# Patient Record
Sex: MALE | Race: WHITE | NOT HISPANIC OR LATINO | Employment: FULL TIME | URBAN - METROPOLITAN AREA
[De-identification: names, ages, dates, MRNs, and addresses within clinical notes are randomized per-mention and may not be internally consistent; named-entity substitution may affect disease eponyms.]

---

## 2017-07-13 ENCOUNTER — HOSPITAL ENCOUNTER (EMERGENCY)
Facility: HOSPITAL | Age: 37
Discharge: HOME | End: 2017-07-13
Attending: EMERGENCY MEDICINE
Payer: COMMERCIAL

## 2017-07-13 ENCOUNTER — TELEPHONE (OUTPATIENT)
Dept: CARDIOLOGY | Facility: CLINIC | Age: 37
End: 2017-07-13

## 2017-07-13 VITALS
RESPIRATION RATE: 18 BRPM | TEMPERATURE: 98 F | HEART RATE: 82 BPM | HEIGHT: 74 IN | SYSTOLIC BLOOD PRESSURE: 147 MMHG | OXYGEN SATURATION: 100 % | DIASTOLIC BLOOD PRESSURE: 90 MMHG | WEIGHT: 195 LBS | BODY MASS INDEX: 25.03 KG/M2

## 2017-07-13 DIAGNOSIS — R53.1 WEAKNESS: ICD-10-CM

## 2017-07-13 DIAGNOSIS — R07.89 OTHER CHEST PAIN: Primary | ICD-10-CM

## 2017-07-13 DIAGNOSIS — R55 NEAR SYNCOPE: Primary | ICD-10-CM

## 2017-07-13 LAB
ALBUMIN SERPL BCP-MCNC: 4.3 G/DL
ALP SERPL-CCNC: 48 U/L
ALT SERPL W/O P-5'-P-CCNC: 42 U/L
AMPHET+METHAMPHET UR QL: NEGATIVE
ANION GAP SERPL CALC-SCNC: 10 MMOL/L
AST SERPL-CCNC: 37 U/L
BARBITURATES UR QL SCN>200 NG/ML: NORMAL
BASOPHILS # BLD AUTO: 0.09 K/UL
BASOPHILS NFR BLD: 1.7 %
BENZODIAZ UR QL SCN>200 NG/ML: NEGATIVE
BILIRUB SERPL-MCNC: 0.7 MG/DL
BILIRUB UR QL STRIP: NEGATIVE
BUN SERPL-MCNC: 10 MG/DL
BZE UR QL SCN: NEGATIVE
CALCIUM SERPL-MCNC: 9.3 MG/DL
CANNABINOIDS UR QL SCN: NEGATIVE
CHLORIDE SERPL-SCNC: 110 MMOL/L
CK SERPL-CCNC: 164 U/L
CLARITY UR: CLEAR
CO2 SERPL-SCNC: 19 MMOL/L
COLOR UR: YELLOW
CREAT SERPL-MCNC: 0.9 MG/DL
CREAT UR-MCNC: 47.1 MG/DL
DIFFERENTIAL METHOD: ABNORMAL
EOSINOPHIL # BLD AUTO: 0.1 K/UL
EOSINOPHIL NFR BLD: 1.3 %
ERYTHROCYTE [DISTWIDTH] IN BLOOD BY AUTOMATED COUNT: 12.1 %
EST. GFR  (AFRICAN AMERICAN): >60 ML/MIN/1.73 M^2
EST. GFR  (NON AFRICAN AMERICAN): >60 ML/MIN/1.73 M^2
ETHANOL SERPL-MCNC: <10 MG/DL
GLUCOSE SERPL-MCNC: 89 MG/DL
GLUCOSE UR QL STRIP: NEGATIVE
HCT VFR BLD AUTO: 41.8 %
HGB BLD-MCNC: 14.2 G/DL
HGB UR QL STRIP: NEGATIVE
KETONES UR QL STRIP: NEGATIVE
LEUKOCYTE ESTERASE UR QL STRIP: NEGATIVE
LYMPHOCYTES # BLD AUTO: 1.3 K/UL
LYMPHOCYTES NFR BLD: 24.3 %
MAGNESIUM SERPL-MCNC: 2.8 MG/DL
MCH RBC QN AUTO: 34.6 PG
MCHC RBC AUTO-ENTMCNC: 34 %
MCV RBC AUTO: 102 FL
METHADONE UR QL SCN>300 NG/ML: NEGATIVE
MONOCYTES # BLD AUTO: 0.7 K/UL
MONOCYTES NFR BLD: 12.5 %
NEUTROPHILS # BLD AUTO: 3.2 K/UL
NEUTROPHILS NFR BLD: 59.8 %
NITRITE UR QL STRIP: NEGATIVE
OPIATES UR QL SCN: NEGATIVE
PCP UR QL SCN>25 NG/ML: NEGATIVE
PH UR STRIP: 6 [PH] (ref 5–8)
PHOSPHATE SERPL-MCNC: 3 MG/DL
PLATELET # BLD AUTO: 197 K/UL
PMV BLD AUTO: 9.5 FL
POCT GLUCOSE: 94 MG/DL (ref 70–110)
POTASSIUM SERPL-SCNC: 4.5 MMOL/L
PROT SERPL-MCNC: 7.3 G/DL
PROT UR QL STRIP: NEGATIVE
RBC # BLD AUTO: 4.1 M/UL
SODIUM SERPL-SCNC: 139 MMOL/L
SP GR UR STRIP: 1.01 (ref 1–1.03)
T4 FREE SERPL-MCNC: 0.77 NG/DL
TOXICOLOGY INFORMATION: NORMAL
TROPONIN I SERPL DL<=0.01 NG/ML-MCNC: <0.006 NG/ML
TSH SERPL DL<=0.005 MIU/L-ACNC: 0.51 UIU/ML
URN SPEC COLLECT METH UR: NORMAL
UROBILINOGEN UR STRIP-ACNC: NEGATIVE EU/DL
WBC # BLD AUTO: 5.35 K/UL

## 2017-07-13 PROCEDURE — 80307 DRUG TEST PRSMV CHEM ANLYZR: CPT

## 2017-07-13 PROCEDURE — 82550 ASSAY OF CK (CPK): CPT

## 2017-07-13 PROCEDURE — 84484 ASSAY OF TROPONIN QUANT: CPT

## 2017-07-13 PROCEDURE — 84439 ASSAY OF FREE THYROXINE: CPT

## 2017-07-13 PROCEDURE — 85025 COMPLETE CBC W/AUTO DIFF WBC: CPT

## 2017-07-13 PROCEDURE — 93010 ELECTROCARDIOGRAM REPORT: CPT | Mod: ,,, | Performed by: INTERNAL MEDICINE

## 2017-07-13 PROCEDURE — 84100 ASSAY OF PHOSPHORUS: CPT

## 2017-07-13 PROCEDURE — 82962 GLUCOSE BLOOD TEST: CPT

## 2017-07-13 PROCEDURE — 80320 DRUG SCREEN QUANTALCOHOLS: CPT

## 2017-07-13 PROCEDURE — 25000003 PHARM REV CODE 250: Performed by: EMERGENCY MEDICINE

## 2017-07-13 PROCEDURE — 99284 EMERGENCY DEPT VISIT MOD MDM: CPT | Mod: 25

## 2017-07-13 PROCEDURE — 80053 COMPREHEN METABOLIC PANEL: CPT

## 2017-07-13 PROCEDURE — 81003 URINALYSIS AUTO W/O SCOPE: CPT

## 2017-07-13 PROCEDURE — 93005 ELECTROCARDIOGRAM TRACING: CPT

## 2017-07-13 PROCEDURE — 84443 ASSAY THYROID STIM HORMONE: CPT

## 2017-07-13 PROCEDURE — 83735 ASSAY OF MAGNESIUM: CPT

## 2017-07-13 PROCEDURE — 96360 HYDRATION IV INFUSION INIT: CPT

## 2017-07-13 RX ADMIN — SODIUM CHLORIDE 1000 ML: 0.9 INJECTION, SOLUTION INTRAVENOUS at 10:07

## 2017-07-13 NOTE — TELEPHONE ENCOUNTER
----- Message from Corey Blake MD sent at 7/13/2017  1:58 PM CDT -----  Regarding: FW: Outpatient Appt   Thank you so much for the referral      Chelsie would you pleas set up a stress test for the patient      Orders are placed      ZN  ----- Message -----  From: Jonh Roger MD  Sent: 7/13/2017  12:31 PM  To: Corey Blake MD  Subject: Outpatient Appt                                  Dr. Blake,        I was hoping you might be able to schedule a patient I saw in the ED at [facility] (7/13) for an outpatient appointment. He is an otherwise healthy 36 y/o M with a history of smoking that has been having exertional lightheadedness at work for about 3 days. 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 His EKG appeared to show some ventricular conduction delay, and he is hypertensive in the ED, but otherwise his radiology and blood workup are benign. I'm also having him follow up with a primary care physician. He can be reached at [phone], and I've also asked him to call your office to schedule an appointment. Thanks for your help!    -Oleksandr Roger

## 2017-07-13 NOTE — TELEPHONE ENCOUNTER
----- Message from Corey Blake MD sent at 7/13/2017  1:58 PM CDT -----  Regarding: FW: Outpatient Appt   Thank you so much for the referral      Chelsie would you pleas set up a stress test for the patient      Orders are placed      ZN  ----- Message -----  From: Jonh Roger MD  Sent: 7/13/2017  12:31 PM  To: Corey Blake MD  Subject: Outpatient Appt                                  Dr. Blake,        I was hoping you might be able to schedule a patient I saw in the ED at [facility] (7/13) for an outpatient appointment. He is an otherwise healthy 36 y/o M with a history of smoking that has been having exertional lightheadedness at work for about 3 days. 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 His EKG appeared to show some ventricular conduction delay, and he is hypertensive in the ED, but otherwise his radiology and blood workup are benign. I'm also having him follow up with a primary care physician. He can be reached at [phone], and I've also asked him to call your office to schedule an appointment. Thanks for your help!    -Oleksandr Roger

## 2017-07-13 NOTE — ED PROVIDER NOTES
"Encounter Date: 7/13/2017       History     Chief Complaint   Patient presents with    Weakness     generalized weakness, extreme fatigue and "feels like Im going to pass out."; states began on Monday and was seen in ED in [facility] yesterday; denies chest pain or shortness of breath     37-year-old male presents the emergency department complaining of weakness.  Patient reports onset Monday, about 3 days ago.  Patient reports that he feels very weak after minor exertion.  Patient has history of being in the [employer] and is employed with somewhat physical labor, working with tools small boat.  His job involves a lot of walking carrying about 30 pounds of tools with him up and down stairs.  Reports that, up until Monday, he had had no difficulty.  Reports Monday he began to feel very lightheaded with mild exertion.  No syncope reported.  Patient reports it would be like getting tunnel vision and feeling lightheaded, with his whole body feeling "heavy."  This would resolve with rest, but it became worse as the day wore on.  After Monday, he stopped drinking his usual energy drinks, but he had no relief with his symptoms.  Wednesday, he was seen at Hospital in [place].  He had negative workup there.  He was instructed to hydrate orally.  At work yesterday, he reports he drank "a liter and a half" of Powerade and "a liter and a half of water".  Reports the symptoms persist.  No other symptoms reported.  Patient denies any headache, double vision, dizziness or difficulty with balance, focal numbness or weakness, paresthesias, sore throat, cough, congestion, chest pain, shortness of breath, abdominal pain, constipation, diarrhea, dysuria, hematuria, fever, chills.          Review of patient's allergies indicates:  No Known Allergies  History reviewed. No pertinent past medical history.  History reviewed. No pertinent surgical history.  History reviewed. No pertinent family history.  Social History   Substance Use " Topics    Smoking status: Current Every Day Smoker    Smokeless tobacco: Never Used    Alcohol use Yes     Review of Systems   Constitutional: Negative for chills, fatigue and fever.   HENT: Negative for congestion, sore throat and voice change.    Eyes: Negative for photophobia, pain and redness.   Respiratory: Negative for cough, choking and shortness of breath.    Cardiovascular: Negative for chest pain, palpitations and leg swelling.   Gastrointestinal: Negative for abdominal pain, diarrhea, nausea and vomiting.   Genitourinary: Negative for dysuria, frequency and urgency.   Musculoskeletal: Negative for back pain, neck pain and neck stiffness.   Neurological: Positive for light-headedness. Negative for seizures, syncope, speech difficulty, numbness and headaches.   All other systems reviewed and are negative.      Physical Exam     Initial Vitals [07/13/17 1009]   BP Pulse Resp Temp SpO2   (!) 183/121 81 18 98.2 °F (36.8 °C) 98 %      MAP       141.67         Physical Exam    Nursing note and vitals reviewed.  Constitutional: He appears well-developed and well-nourished. No distress.   HENT:   Head: Normocephalic and atraumatic.   Mouth/Throat: Oropharynx is clear and moist.   Eyes: Conjunctivae and EOM are normal. Pupils are equal, round, and reactive to light.   Neck: Normal range of motion. Neck supple. No tracheal deviation present.   Cardiovascular: Normal rate, regular rhythm, normal heart sounds and intact distal pulses.   Pulmonary/Chest: Breath sounds normal. No respiratory distress. He has no wheezes. He has no rhonchi. He has no rales.   Abdominal: Soft. Bowel sounds are normal. He exhibits no distension. There is no tenderness. There is no rebound and no guarding.   Musculoskeletal: Normal range of motion. He exhibits no edema or tenderness.   Neurological: He is alert and oriented to person, place, and time. He has normal strength. No cranial nerve deficit or sensory deficit.   Skin: Skin is  warm and dry. Capillary refill takes less than 2 seconds.         ED Course   Procedures  Labs Reviewed   URINALYSIS   CBC W/ AUTO DIFFERENTIAL   COMPREHENSIVE METABOLIC PANEL   DRUG SCREEN PANEL, URINE EMERGENCY   ALCOHOL,MEDICAL (ETHANOL)   TSH   TROPONIN I   MAGNESIUM   CK   PHOSPHORUS   T4, FREE   POCT GLUCOSE MONITORING CONTINUOUS     EKG Readings: (Independently Interpreted)   Initial Reading: No STEMI. Rhythm: Normal Sinus Rhythm. Heart Rate: 71. Ectopy: No Ectopy. Conduction: Nonspecific ventricular conduction delay. ST Segments: Normal ST Segments. T Waves: Normal. Axis: Normal.       X-Rays:   Independently Interpreted Readings:   Chest X-Ray: Chest x-ray interpreted by radiologist and visualized by me     Imaging Results          X-Ray Chest PA And Lateral (Final result)  Result time 07/13/17 10:50:30    Final result by Tadeo Puente MD (07/13/17 10:50:30)                 Impression:      No acute cardiopulmonary process identified.      Electronically signed by: TADEO PUENTE MD  Date:     07/13/17  Time:    10:50              Narrative:    Chest PA and lateral.  Comparison: None.    Mediastinal structures are midline.  Cardiac silhouette is normal in size.  Lungs are symmetrically expanded.  No evidence of focal consolidative process, pneumothorax, or significant effusion.  Bones appear intact.  No free air visualized beneath the diaphragm.                              Medical Decision Making:   Initial Assessment:   37-year-old male presents the emergency department complaining of generalized weakness, exertional near-syncope  Differential Diagnosis:   Dehydration; neurogenic versus cardiovascular versus vasovagal syncope; arrhythmia; thyroid disease; electrolyte dyscrasia;  Independently Interpreted Test(s):   I have ordered and independently interpreted X-rays - see prior notes.  I have ordered and independently interpreted EKG Reading(s) - see prior notes  Clinical Tests:   Lab Tests: Reviewed        <> Summary of Lab: Benign  ED Management:  Patient given a liter fluids.  His vital signs are stable albeit hypertensive and he has no complaints at this time.  We discussed disposition and I have encouraged him to follow-up with a primary care physician and cardiologist as soon as he can get an appointment.  I've also sent a message via epic to Dr. Blake to see if he can schedule the patient for a follow-up appointment for further evaluation.  I informed the patient of his elevated blood pressure and recommended he seek follow-up for management, instructed him to return with any new or worsening symptoms, especially chest pain, shortness of breath, fever, vomiting, or any other emergent symptoms.  Patient expressed good understanding and is comfortable with discharge at this time.  I've also instructed him on smoking cessation.                   ED Course     Clinical Impression:   The primary encounter diagnosis was Near syncope. A diagnosis of Weakness was also pertinent to this visit.    Disposition:   Disposition: Discharged  Condition: Stable                        Jonh Roger MD  07/13/17 1418

## 2017-07-20 ENCOUNTER — OFFICE VISIT (OUTPATIENT)
Dept: CARDIOLOGY | Facility: CLINIC | Age: 37
End: 2017-07-20
Payer: COMMERCIAL

## 2017-07-20 ENCOUNTER — HOSPITAL ENCOUNTER (OUTPATIENT)
Dept: CARDIOLOGY | Facility: HOSPITAL | Age: 37
Discharge: HOME | End: 2017-07-20
Attending: INTERNAL MEDICINE
Payer: COMMERCIAL

## 2017-07-20 VITALS
SYSTOLIC BLOOD PRESSURE: 154 MMHG | HEART RATE: 83 BPM | HEIGHT: 74 IN | WEIGHT: 192 LBS | DIASTOLIC BLOOD PRESSURE: 101 MMHG | BODY MASS INDEX: 24.64 KG/M2

## 2017-07-20 DIAGNOSIS — R07.89 OTHER CHEST PAIN: ICD-10-CM

## 2017-07-20 DIAGNOSIS — R55 NEAR SYNCOPE: ICD-10-CM

## 2017-07-20 DIAGNOSIS — Z72.0 TOBACCO USE: ICD-10-CM

## 2017-07-20 DIAGNOSIS — R94.39 EQUIVOCAL STRESS TEST: Primary | ICD-10-CM

## 2017-07-20 DIAGNOSIS — I10 ESSENTIAL HYPERTENSION: ICD-10-CM

## 2017-07-20 DIAGNOSIS — Z82.49 FAMILY HISTORY OF HEART DISEASE: ICD-10-CM

## 2017-07-20 DIAGNOSIS — R07.89 CHEST TIGHTNESS OR PRESSURE: ICD-10-CM

## 2017-07-20 LAB — DIASTOLIC DYSFUNCTION: NO

## 2017-07-20 PROCEDURE — 99999 PR PBB SHADOW E&M-EST. PATIENT-LVL III: CPT | Mod: PBBFAC,,, | Performed by: INTERNAL MEDICINE

## 2017-07-20 PROCEDURE — 93017 CV STRESS TEST TRACING ONLY: CPT

## 2017-07-20 PROCEDURE — 93018 CV STRESS TEST I&R ONLY: CPT | Mod: ,,, | Performed by: INTERNAL MEDICINE

## 2017-07-20 PROCEDURE — 93016 CV STRESS TEST SUPVJ ONLY: CPT | Mod: ,,, | Performed by: INTERNAL MEDICINE

## 2017-07-20 PROCEDURE — 99204 OFFICE O/P NEW MOD 45 MIN: CPT | Mod: 25,S$GLB,, | Performed by: INTERNAL MEDICINE

## 2017-07-20 RX ORDER — ASPIRIN 81 MG/1
81 TABLET ORAL DAILY
Qty: 30 TABLET | Refills: 12
Start: 2017-07-20 | End: 2019-08-14

## 2017-07-20 RX ORDER — LISINOPRIL AND HYDROCHLOROTHIAZIDE 10; 12.5 MG/1; MG/1
1 TABLET ORAL DAILY
Qty: 90 TABLET | Refills: 3 | Status: SHIPPED | OUTPATIENT
Start: 2017-07-20 | End: 2017-08-21

## 2017-07-20 RX ORDER — NITROGLYCERIN 0.4 MG/1
0.4 TABLET SUBLINGUAL EVERY 5 MIN PRN
Qty: 20 TABLET | Refills: 12 | Status: SHIPPED | OUTPATIENT
Start: 2017-07-20 | End: 2020-11-18

## 2017-07-20 NOTE — PROGRESS NOTES
Subjective:   Patient ID:  Erik Mims is a 37 y.o. male who presents for evaluation and treament of Hypertension; near syncope; and initiate medical therapy      HPI:         He is here for evaluation and treatment of hypertension. He went to 2 ED visits in the past week. He went to [facility] on 2017 and came to [facility] on 2017. TNI ad ECG were normal. Treadmill stress test did not revealed any ST changes after 110% predicted heart rate. He had an hypertensive response with baseline /98 and peaked 210/85. Even though the stress was normal at the end of the test he the same feeling like he experienced the past few days; chest tightness, near syncope, and weakness. Risk factors for CAD: HTN, tobacoo use, and family history of premature CAD.         Patient Active Problem List    Diagnosis Date Noted    Essential hypertension 2017    Tobacco use 2017    Near syncope 2017    Family history of heart disease 2017         Father with premature CAD   AMI in his 50's        Equivocal stress test 2017    Chest tightness or pressure 2017           Right Arm BP - Sittin/95  Left Arm BP - Sittin/101          Review of Systems   Constitution: Negative for diaphoresis, weakness, night sweats, weight gain and weight loss.   HENT: Negative for congestion.    Eyes: Negative for blurred vision, discharge and double vision.   Cardiovascular: Positive for chest pain. Negative for claudication, cyanosis, dyspnea on exertion, irregular heartbeat, leg swelling, near-syncope, orthopnea, palpitations, paroxysmal nocturnal dyspnea and syncope.   Respiratory: Negative for cough, shortness of breath and wheezing.    Endocrine: Negative for cold intolerance, heat intolerance and polyphagia.   Hematologic/Lymphatic: Negative for adenopathy and bleeding problem. Does not bruise/bleed easily.   Skin: Negative for dry skin and nail changes.    Musculoskeletal: Negative for arthritis, back pain, falls, joint pain, myalgias and neck pain.   Gastrointestinal: Negative for bloating, abdominal pain, change in bowel habit and constipation.   Genitourinary: Negative for bladder incontinence, dysuria, flank pain, genital sores and missed menses.   Neurological: Negative for aphonia, brief paralysis, difficulty with concentration and dizziness.   Psychiatric/Behavioral: Negative for altered mental status and memory loss. The patient does not have insomnia.    Allergic/Immunologic: Negative for environmental allergies.       Objective:   Physical Exam   Constitutional: He is oriented to person, place, and time. He appears well-developed and well-nourished. He is not intubated.   HENT:   Head: Normocephalic and atraumatic.   Right Ear: External ear normal.   Left Ear: External ear normal.   Mouth/Throat: Oropharynx is clear and moist.   Eyes: Conjunctivae and EOM are normal. Pupils are equal, round, and reactive to light. Right eye exhibits no discharge. Left eye exhibits no discharge. No scleral icterus.   Neck: Normal range of motion. Neck supple. Normal carotid pulses, no hepatojugular reflux and no JVD present. Carotid bruit is not present. No tracheal deviation present. No thyromegaly present.   Cardiovascular: Normal rate, regular rhythm, S1 normal and S2 normal.   No extrasystoles are present. PMI is not displaced.  Exam reveals no gallop, no S3, no distant heart sounds, no friction rub and no midsystolic click.    No murmur heard.  Pulses:       Carotid pulses are 2+ on the right side, and 2+ on the left side.       Radial pulses are 2+ on the right side, and 2+ on the left side.        Femoral pulses are 2+ on the right side, and 2+ on the left side.       Popliteal pulses are 2+ on the right side, and 2+ on the left side.        Dorsalis pedis pulses are 2+ on the right side, and 2+ on the left side.        Posterior tibial pulses are 2+ on the right  side, and 2+ on the left side.   Pulmonary/Chest: Effort normal and breath sounds normal. No accessory muscle usage or stridor. No apnea, no tachypnea and no bradypnea. He is not intubated. No respiratory distress. He has no decreased breath sounds. He has no wheezes. He has no rales. He exhibits no tenderness and no bony tenderness.   Abdominal: He exhibits no distension, no pulsatile liver, no abdominal bruit, no ascites, no pulsatile midline mass and no mass. There is no tenderness. There is no rebound and no guarding.   Musculoskeletal: Normal range of motion. He exhibits no edema or tenderness.   Lymphadenopathy:     He has no cervical adenopathy.   Neurological: He is alert and oriented to person, place, and time. He has normal reflexes. No cranial nerve deficit. Coordination normal.   Skin: Skin is warm. No rash noted. No erythema. No pallor.   Psychiatric: He has a normal mood and affect. His behavior is normal. Judgment and thought content normal.       Assessment:     1. Equivocal stress test    2. Essential hypertension    3. Tobacco use    4. Near syncope    5. Family history of heart disease    6. Chest tightness or pressure        Plan:             Echo to assess EF  CTA to assess for CAD and congenital heart disease  Lipid profile      Smoking cessation  Phone review after these tests        Start aspirin 81 mg daily  Start lisinopril/hctz 10/12.5 mg po daily  SN NTG for CP      Continue with current medical plan and lifestyle changes.  Return sooner for concerns or questions. If symptoms persist go to the ED  I have reviewed all pertinent data on this patient       I have reviewed the patient's medical history in detail and updated the computerized patient record.    Orders Placed This Encounter   Procedures    CTA Cardiac     Standing Status:   Future     Standing Expiration Date:   7/20/2018     Order Specific Question:   Is the patient allergic to iodine or contrast?     Answer:   No     Order  Specific Question:   Is the patient on ANY Metformin medication such as Glugophage/Glucovance ?     Answer:   No     Order Specific Question:   Does the patient have any of the following risk factors?     Answer:   None     Order Specific Question:   Is the patient allergic to iodine or contrast? Has a steroid / antihistamine prep been administered?     Answer:   No     Order Specific Question:   Is the patient on ANY Metformin drug such as Glugophage/Glucovance?           Should be off drug 48 hours after contrast. Check renal function before restart.     Answer:   No     Order Specific Question:   Age > 60 years?     Answer:   No     Order Specific Question:   History of Kidney Disease - including: decreased kidney function, dialysis, kidney transplay, single kidney, kidney cancer, kidney surgery?     Answer:   None     Order Specific Question:   Does the patient have high blood pressure requiring medical treatment?     Answer:   Yes     Order Specific Question:   Diabetes?     Answer:   No    Lipid panel     Standing Status:   Future     Standing Expiration Date:   9/18/2018    CTA Cardiac Cardiology Component     Standing Status:   Future     Standing Expiration Date:   7/20/2018    2D Echo w/ Color Flow Doppler     Standing Status:   Future     Standing Expiration Date:   7/20/2018       Follow up as scheduled. Return sooner for concerns or questions            He expressed verbal understanding and agreed with the plan        Patient's Medications   New Prescriptions    ASPIRIN (ECOTRIN) 81 MG EC TABLET    Take 1 tablet (81 mg total) by mouth once daily.    LISINOPRIL-HYDROCHLOROTHIAZIDE (PRINZIDE,ZESTORETIC) 10-12.5 MG PER TABLET    Take 1 tablet by mouth once daily.    NITROGLYCERIN (NITROSTAT) 0.4 MG SL TABLET    Place 1 tablet (0.4 mg total) under the tongue every 5 (five) minutes as needed for Chest pain.   Previous Medications    No medications on file   Modified Medications    No medications on file    Discontinued Medications    No medications on file

## 2017-07-20 NOTE — PATIENT INSTRUCTIONS
Taking Your Blood Pressure  Blood pressure is the force of blood against the artery wall as it moves from the heart through the blood vessels. You can take your own blood pressure reading using a digital monitor. Take readings as often as your healthcare provider instructs. Take each reading at the same time of day.  Step 1. Relax    · Take your blood pressure at the same time every day, such as in the morning or evening, or at the time your healthcare provider recommends.  · Wait at least a half-hour after smoking, eating, drinking caffeinated beverages, or exercising.  · Sit comfortably at a table with both feet on the floor. Do not cross your legs or feet. Place the monitor near you.  · Rest for a few minutes before you begin.  Step 2. Wrap the cuff    · Place your arm on the table, palm up. Your arm should be at the level of your heart. Wrap the cuff around your upper arm, just above your elbow. Its best done on bare skin, not over clothing. Most cuffs will indicate where the brachial artery (the blood vessel in the middle of the arm at the inner side of the elbow) should line up with the cuff. Look in your monitor's instruction booklet for an illustration. You can also bring your cuff to your healthcare provider and have them show you how to correctly place the cuff.  · Make sure your cuff fits. If it doesnt wrap around your upper arm, order a larger cuff.  Step 3. Inflate the cuff    · Pump the cuff until the scale reads 160. If you have a self-inflating cuff, push the button that starts the pump.  · The cuff will tighten, then loosen.  · The numbers will change. When they stop changing, your blood pressure reading will appear.  · Take 2 or 3 readings one minute apart.  Step 4. Write down the results of each reading    · Write down your blood pressure numbers for each reading. Note the date and time. Keep your results in one place, such as a notebook. Even if your monitor has a built-in memory, keep a hard  copy of the readings.  · Remove the cuff from your arm. Turn off the machine.  · Share your blood pressure records with your healthcare providers at each visit.  Date Last Reviewed: 4/27/2016  © 7634-6444 [employer]. [address] [place] [address], [place] [address]. All rights reserved. This information is not intended as a substitute for professional medical care. Always follow your healthcare professional's instructions.

## 2017-07-24 ENCOUNTER — OFFICE VISIT (OUTPATIENT)
Dept: INTERNAL MEDICINE | Facility: CLINIC | Age: 37
End: 2017-07-24
Payer: COMMERCIAL

## 2017-07-24 VITALS
HEIGHT: 74 IN | OXYGEN SATURATION: 98 % | DIASTOLIC BLOOD PRESSURE: 95 MMHG | WEIGHT: 190.5 LBS | SYSTOLIC BLOOD PRESSURE: 146 MMHG | HEART RATE: 82 BPM | BODY MASS INDEX: 24.45 KG/M2

## 2017-07-24 DIAGNOSIS — I10 ESSENTIAL HYPERTENSION: Primary | ICD-10-CM

## 2017-07-24 PROCEDURE — 99202 OFFICE O/P NEW SF 15 MIN: CPT | Mod: S$GLB,,, | Performed by: INTERNAL MEDICINE

## 2017-07-24 PROCEDURE — 99999 PR PBB SHADOW E&M-EST. PATIENT-LVL III: CPT | Mod: PBBFAC,,, | Performed by: INTERNAL MEDICINE

## 2017-07-24 NOTE — PROGRESS NOTES
HPI:  Erik Mims is a 37 y.o. year old male that  presents with   Chief Complaint   Patient presents with    Annual Exam    Establish Care   .   Patient was just recently seen by cardiology.  Patient has been to the ER twice.  His ER visits or due the fact that he felt like he was going to collapse while at work.  Patient blood pressures noted to be significantly elevated.  Patient has stress test that was done which was equivocal.  Patient is to have a CTA as well as an echo done.  Reviewed note from cardiology visit.  Discussed with patient quitting smoking.  Patient's father had a heart attack in his mid 50s.    Patient is currently stressed by the fact that he cannot return to full-time work until he is cleared from this current issue regarding his blood pressure and chest pain.    Answers for HPI/ROS submitted by the patient on 7/24/2017   activity change: No  unexpected weight change: No  rhinorrhea: No  trouble swallowing: No  visual disturbance: No  chest tightness: Yes  polyuria: No  difficulty urinating: No  joint swelling: No  arthralgias: No  confusion: No  dysphoric mood: No      History reviewed. No pertinent past medical history.  Social History     Social History    Marital status: Single     Spouse name: N/A    Number of children: N/A    Years of education: N/A     Occupational History    Not on file.     Social History Main Topics    Smoking status: Current Every Day Smoker    Smokeless tobacco: Never Used    Alcohol use Yes    Drug use: No    Sexual activity: Not on file     Other Topics Concern    Not on file     Social History Narrative    No narrative on file     History reviewed. No pertinent surgical history.  History reviewed. No pertinent family history.        HPI    Health Maintenance Topics with due status: Overdue       Topic Date Due    Lipid Panel 1980    TETANUS VACCINE 06/28/1996    Pneumococcal PPSV23 (Medium Risk) 06/28/1996       Review of  "Systems  Review of Systems   HENT: Negative for hearing loss.    Eyes: Negative for discharge.   Respiratory: Negative for wheezing.    Cardiovascular: Positive for palpitations. Negative for chest pain.   Gastrointestinal: Negative for blood in stool, constipation, diarrhea and vomiting.   Genitourinary: Negative for hematuria and urgency.   Musculoskeletal: Negative for neck pain.   Neurological: Positive for weakness. Negative for headaches.   Endo/Heme/Allergies: Negative for polydipsia.     No abdominal pain, no nausea    Physical Exam:  BP (!) 146/95 (BP Location: Left arm, Patient Position: Sitting, BP Method: Automatic)   Pulse 82   Ht 6' 2" (1.88 m)   Wt 86.4 kg (190 lb 7.6 oz)   SpO2 98%   BMI 24.46 kg/m²   Physical Exam    Vital Signs: Reviewed  General:  No apparent distress  Head:  No signs of head trauma  Eyes:  Pupils are equal.  Extraocular motions intact.  Normal conjunctiva.  Ears:  Hearing grossly intact.  Neck:  Supple.  No carotid bruit.  No thyromegaly.  Chest:  Clear breath sounds bilaterally.  No wheezes bilaterally.  Non-labored respirations.  Symmetrical expansion.  Cardiac:  Normal rate.  Normal rhythmn.  S1 and S2.  Vascular:  No edema.  Peripheral pulses palpable in all extremities.  Abdomen:  Soft without detectable tenderness.  No signs of distention.  No rebound or guarding.  No masses palpated.  Bowel sounds normal.  Genitourinary:  No CVA tenderness.  Musculoskeletal:  Normal gait.  Extremities without clubbing or cyanosis.  Neuro:  Alert & oriented X 3.  Speech normal.   Follows commands.  Psychiatric:  Mood normal.  Skin:  No obvious rash or lesions.  LABS:    Recent Results (from the past 2016 hour(s))   POCT glucose    Collection Time: 07/13/17 10:52 AM   Result Value Ref Range    POCT Glucose 94 70 - 110 mg/dL   CBC auto differential    Collection Time: 07/13/17 10:53 AM   Result Value Ref Range    WBC 5.35 3.90 - 12.70 K/uL    RBC 4.10 (L) 4.60 - 6.20 M/uL    Hemoglobin " 14.2 14.0 - 18.0 g/dL    Hematocrit 41.8 40.0 - 54.0 %     (H) 82 - 98 fL    MCH 34.6 (H) 27.0 - 31.0 pg    MCHC 34.0 32.0 - 36.0 %    RDW 12.1 11.5 - 14.5 %    Platelets 197 150 - 350 K/uL    MPV 9.5 9.2 - 12.9 fL    Gran # 3.2 1.8 - 7.7 K/uL    Lymph # 1.3 1.0 - 4.8 K/uL    Mono # 0.7 0.3 - 1.0 K/uL    Eos # 0.1 0.0 - 0.5 K/uL    Baso # 0.09 0.00 - 0.20 K/uL    Gran% 59.8 38.0 - 73.0 %    Lymph% 24.3 18.0 - 48.0 %    Mono% 12.5 4.0 - 15.0 %    Eosinophil% 1.3 0.0 - 8.0 %    Basophil% 1.7 0.0 - 1.9 %    Differential Method Automated    Comprehensive metabolic panel    Collection Time: 07/13/17 10:53 AM   Result Value Ref Range    Sodium 139 136 - 145 mmol/L    Potassium 4.5 3.5 - 5.1 mmol/L    Chloride 110 95 - 110 mmol/L    CO2 19 (L) 23 - 29 mmol/L    Glucose 89 70 - 110 mg/dL    BUN, Bld 10 6 - 20 mg/dL    Creatinine 0.9 0.5 - 1.4 mg/dL    Calcium 9.3 8.7 - 10.5 mg/dL    Total Protein 7.3 6.0 - 8.4 g/dL    Albumin 4.3 3.5 - 5.2 g/dL    Total Bilirubin 0.7 0.1 - 1.0 mg/dL    Alkaline Phosphatase 48 (L) 55 - 135 U/L    AST 37 10 - 40 U/L    ALT 42 10 - 44 U/L    Anion Gap 10 8 - 16 mmol/L    eGFR if African American >60 >60 mL/min/1.73 m^2    eGFR if non African American >60 >60 mL/min/1.73 m^2   Ethanol    Collection Time: 07/13/17 10:53 AM   Result Value Ref Range    Alcohol, Medical, Serum <10 <10 mg/dL   TSH    Collection Time: 07/13/17 10:53 AM   Result Value Ref Range    TSH 0.506 0.400 - 4.000 uIU/mL   Troponin I    Collection Time: 07/13/17 10:53 AM   Result Value Ref Range    Troponin I <0.006 0.000 - 0.026 ng/mL   Magnesium    Collection Time: 07/13/17 10:53 AM   Result Value Ref Range    Magnesium 2.8 (H) 1.6 - 2.6 mg/dL   CPK    Collection Time: 07/13/17 10:53 AM   Result Value Ref Range     20 - 200 U/L   Phosphorus    Collection Time: 07/13/17 10:53 AM   Result Value Ref Range    Phosphorus 3.0 2.7 - 4.5 mg/dL   T4, free    Collection Time: 07/13/17 10:53 AM   Result Value Ref Range     Free T4 0.77 0.71 - 1.51 ng/dL   Urinalysis - Clean Catch    Collection Time: 07/13/17 10:57 AM   Result Value Ref Range    Specimen UA Urine, Clean Catch     Color, UA Yellow Yellow, Straw, Christiane    Appearance, UA Clear Clear    pH, UA 6.0 5.0 - 8.0    Specific Gravity, UA 1.010 1.005 - 1.030    Protein, UA Negative Negative    Glucose, UA Negative Negative    Ketones, UA Negative Negative    Bilirubin (UA) Negative Negative    Occult Blood UA Negative Negative    Nitrite, UA Negative Negative    Urobilinogen, UA Negative <2.0 EU/dL    Leukocytes, UA Negative Negative   Drug screen panel, emergency    Collection Time: 07/13/17 10:57 AM   Result Value Ref Range    Benzodiazepines Negative     Methadone metabolites Negative     Cocaine (Metab.) Negative     Opiate Scrn, Ur Negative     Barbiturate Screen, Ur Presumptive Positive     Amphetamine Screen, Ur Negative     THC Negative     Phencyclidine Negative     Creatinine, Random Ur 47.1 23.0 - 375.0 mg/dL    Toxicology Information SEE COMMENT    Cardiac treadmill stress test    Collection Time: 07/20/17  8:00 AM   Result Value Ref Range    Diastolic Dysfunction No        Imaging:  Imaging Results    None           Assessment:    ICD-10-CM ICD-9-CM    1. Essential hypertension I10 401.9      The encounter diagnosis was Essential hypertension.      Plan:  No orders of the defined types were placed in this encounter.   hypertension not controlled.  Patient was just started on medications.  We'll have him follow up in 2 weeks.  We'll try to expedite his cardiology studies due to the fact that this  preventing her from going back to work completely        Jaquan Huffman MD

## 2017-07-28 ENCOUNTER — HOSPITAL ENCOUNTER (OUTPATIENT)
Dept: CARDIOLOGY | Facility: CLINIC | Age: 37
Discharge: HOME | End: 2017-07-28
Payer: COMMERCIAL

## 2017-07-28 ENCOUNTER — HOSPITAL ENCOUNTER (OUTPATIENT)
Dept: RADIOLOGY | Facility: HOSPITAL | Age: 37
Discharge: HOME | End: 2017-07-28
Attending: INTERNAL MEDICINE
Payer: COMMERCIAL

## 2017-07-28 VITALS — RESPIRATION RATE: 16 BRPM | HEART RATE: 70 BPM | DIASTOLIC BLOOD PRESSURE: 67 MMHG | SYSTOLIC BLOOD PRESSURE: 133 MMHG

## 2017-07-28 DIAGNOSIS — R94.39 EQUIVOCAL STRESS TEST: ICD-10-CM

## 2017-07-28 DIAGNOSIS — R07.89 CHEST TIGHTNESS OR PRESSURE: ICD-10-CM

## 2017-07-28 LAB
DIASTOLIC DYSFUNCTION: NO
ESTIMATED PA SYSTOLIC PRESSURE: 38.28
MITRAL VALVE REGURGITATION: NORMAL
RETIRED EF AND QEF - SEE NOTES: 65 (ref 55–65)
TRICUSPID VALVE REGURGITATION: NORMAL

## 2017-07-28 PROCEDURE — 75574 CT ANGIO HRT W/3D IMAGE: CPT | Mod: 26,,, | Performed by: RADIOLOGY

## 2017-07-28 PROCEDURE — 25500020 PHARM REV CODE 255: Performed by: INTERNAL MEDICINE

## 2017-07-28 PROCEDURE — 93306 TTE W/DOPPLER COMPLETE: CPT | Mod: S$GLB,,, | Performed by: INTERNAL MEDICINE

## 2017-07-28 PROCEDURE — 25000003 PHARM REV CODE 250: Performed by: INTERNAL MEDICINE

## 2017-07-28 PROCEDURE — 75574 CT ANGIO HRT W/3D IMAGE: CPT | Mod: TC

## 2017-07-28 RX ORDER — NITROGLYCERIN 0.4 MG/1
0.4 TABLET SUBLINGUAL ONCE
Status: COMPLETED | OUTPATIENT
Start: 2017-07-28 | End: 2017-07-28

## 2017-07-28 RX ADMIN — IOHEXOL 150 ML: 350 INJECTION, SOLUTION INTRAVENOUS at 11:07

## 2017-07-28 RX ADMIN — NITROGLYCERIN 0.4 MG: 0.4 TABLET SUBLINGUAL at 10:07

## 2017-08-01 ENCOUNTER — PATIENT MESSAGE (OUTPATIENT)
Dept: FAMILY MEDICINE | Facility: CLINIC | Age: 37
End: 2017-08-01

## 2017-08-07 ENCOUNTER — PATIENT MESSAGE (OUTPATIENT)
Dept: FAMILY MEDICINE | Facility: CLINIC | Age: 37
End: 2017-08-07

## 2017-08-09 ENCOUNTER — LAB VISIT (OUTPATIENT)
Dept: LAB | Facility: HOSPITAL | Age: 37
End: 2017-08-09
Attending: INTERNAL MEDICINE
Payer: COMMERCIAL

## 2017-08-09 DIAGNOSIS — R07.89 CHEST TIGHTNESS OR PRESSURE: ICD-10-CM

## 2017-08-09 LAB
CHOLEST/HDLC SERPL: 4.1 {RATIO}
HDL/CHOLESTEROL RATIO: 24.5 %
HDLC SERPL-MCNC: 233 MG/DL
HDLC SERPL-MCNC: 57 MG/DL
LDLC SERPL CALC-MCNC: 150 MG/DL
NONHDLC SERPL-MCNC: 176 MG/DL
TRIGL SERPL-MCNC: 130 MG/DL

## 2017-08-09 PROCEDURE — 80061 LIPID PANEL: CPT

## 2017-08-09 PROCEDURE — 36415 COLL VENOUS BLD VENIPUNCTURE: CPT

## 2017-08-21 ENCOUNTER — OFFICE VISIT (OUTPATIENT)
Dept: FAMILY MEDICINE | Facility: CLINIC | Age: 37
End: 2017-08-21
Payer: COMMERCIAL

## 2017-08-21 VITALS
TEMPERATURE: 99 F | HEART RATE: 90 BPM | DIASTOLIC BLOOD PRESSURE: 88 MMHG | WEIGHT: 191.56 LBS | OXYGEN SATURATION: 97 % | BODY MASS INDEX: 24.58 KG/M2 | HEIGHT: 74 IN | SYSTOLIC BLOOD PRESSURE: 156 MMHG

## 2017-08-21 DIAGNOSIS — G25.81 RLS (RESTLESS LEGS SYNDROME): ICD-10-CM

## 2017-08-21 DIAGNOSIS — I10 ESSENTIAL HYPERTENSION: ICD-10-CM

## 2017-08-21 DIAGNOSIS — R20.0 NUMBNESS AND TINGLING OF BOTH FEET: ICD-10-CM

## 2017-08-21 DIAGNOSIS — M79.651 BILATERAL THIGH PAIN: Primary | ICD-10-CM

## 2017-08-21 DIAGNOSIS — M79.652 BILATERAL THIGH PAIN: Primary | ICD-10-CM

## 2017-08-21 DIAGNOSIS — Z23 NEED FOR TETANUS BOOSTER: ICD-10-CM

## 2017-08-21 DIAGNOSIS — R06.02 SOB (SHORTNESS OF BREATH): ICD-10-CM

## 2017-08-21 DIAGNOSIS — R20.2 NUMBNESS AND TINGLING OF BOTH FEET: ICD-10-CM

## 2017-08-21 DIAGNOSIS — Z72.0 TOBACCO USE: ICD-10-CM

## 2017-08-21 DIAGNOSIS — G47.33 OSA (OBSTRUCTIVE SLEEP APNEA): ICD-10-CM

## 2017-08-21 PROCEDURE — 90715 TDAP VACCINE 7 YRS/> IM: CPT | Mod: S$GLB,,, | Performed by: INTERNAL MEDICINE

## 2017-08-21 PROCEDURE — 90471 IMMUNIZATION ADMIN: CPT | Mod: S$GLB,,, | Performed by: INTERNAL MEDICINE

## 2017-08-21 PROCEDURE — 3008F BODY MASS INDEX DOCD: CPT | Mod: S$GLB,,, | Performed by: INTERNAL MEDICINE

## 2017-08-21 PROCEDURE — 99214 OFFICE O/P EST MOD 30 MIN: CPT | Mod: 25,S$GLB,, | Performed by: INTERNAL MEDICINE

## 2017-08-21 RX ORDER — LISINOPRIL AND HYDROCHLOROTHIAZIDE 12.5; 2 MG/1; MG/1
1 TABLET ORAL DAILY
Qty: 90 TABLET | Refills: 3 | Status: SHIPPED | OUTPATIENT
Start: 2017-08-21 | End: 2017-09-21 | Stop reason: SDUPTHER

## 2017-08-21 RX ORDER — ROPINIROLE 0.25 MG/1
0.25 TABLET, FILM COATED ORAL 3 TIMES DAILY
Qty: 90 TABLET | Refills: 11 | Status: SHIPPED | OUTPATIENT
Start: 2017-08-21 | End: 2017-09-21 | Stop reason: SDUPTHER

## 2017-08-21 NOTE — PROGRESS NOTES
HPI:  Erik Mims is a 37 y.o. year old male that  presents with   Chief Complaint   Patient presents with    Follow-up   .       History reviewed. No pertinent past medical history.  Social History     Social History    Marital status: Single     Spouse name: N/A    Number of children: N/A    Years of education: N/A     Occupational History    Not on file.     Social History Main Topics    Smoking status: Current Every Day Smoker    Smokeless tobacco: Never Used    Alcohol use Yes    Drug use: No    Sexual activity: Not on file     Other Topics Concern    Not on file     Social History Narrative    No narrative on file     History reviewed. No pertinent surgical history.  History reviewed. No pertinent family history.        Pt was previously dx/d with RLS - treated with 0.25 of requip - will restart  Pt describes loud snoring and poor sleep, was previously told to get sleep study.  Has daytime somnolence and fatigue and witnessed apneic episodes.  Pt c/o bilat thigh pain in am - question relation to RLS  Pt c/o bilat feet feeling numb in Am    Discused SPPN    Cards w/u was negative (echo and CTA)   Can RTW    Pt has much concern over anxiety and cause of issues, discussed not starting too many new meds at once and consider starting on next visit.        Health Maintenance Topics with due status: Overdue       Topic Date Due    TETANUS VACCINE 06/28/1996    Pneumococcal PPSV23 (Medium Risk) 06/28/1996    Influenza Vaccine 08/01/2017       Review of Systems  Review of Systems   Constitutional: Negative for chills and fever.   Eyes: Positive for blurred vision.   Respiratory: Positive for cough and shortness of breath.    Cardiovascular: Negative for chest pain.   Skin: Negative for rash.   Neurological: Positive for tingling. Negative for headaches.   Psychiatric/Behavioral: The patient is nervous/anxious.          Physical Exam:  BP (!) 156/88   Pulse 90   Temp 98.6 °F (37 °C) (Oral)    "Ht 6' 2" (1.88 m)   Wt 86.9 kg (191 lb 9.3 oz)   SpO2 97%   BMI 24.60 kg/m²   Physical Exam   Constitutional: He is oriented to person, place, and time and well-developed, well-nourished, and in no distress.   HENT:   Head: Normocephalic and atraumatic.   Eyes: Conjunctivae and EOM are normal. Pupils are equal, round, and reactive to light.   Neck: Normal range of motion.   Cardiovascular: Normal rate, regular rhythm, normal heart sounds and intact distal pulses.    Pulmonary/Chest: Effort normal and breath sounds normal.   Musculoskeletal: Normal range of motion.   Neurological: He is alert and oriented to person, place, and time. Gait normal.   Skin: Skin is warm and dry.   Psychiatric: Judgment normal.     bilat feet with good sensitivity to sharp and dull    LABS:    Recent Results (from the past 2016 hour(s))   POCT glucose    Collection Time: 07/13/17 10:52 AM   Result Value Ref Range    POCT Glucose 94 70 - 110 mg/dL   CBC auto differential    Collection Time: 07/13/17 10:53 AM   Result Value Ref Range    WBC 5.35 3.90 - 12.70 K/uL    RBC 4.10 (L) 4.60 - 6.20 M/uL    Hemoglobin 14.2 14.0 - 18.0 g/dL    Hematocrit 41.8 40.0 - 54.0 %     (H) 82 - 98 fL    MCH 34.6 (H) 27.0 - 31.0 pg    MCHC 34.0 32.0 - 36.0 %    RDW 12.1 11.5 - 14.5 %    Platelets 197 150 - 350 K/uL    MPV 9.5 9.2 - 12.9 fL    Gran # 3.2 1.8 - 7.7 K/uL    Lymph # 1.3 1.0 - 4.8 K/uL    Mono # 0.7 0.3 - 1.0 K/uL    Eos # 0.1 0.0 - 0.5 K/uL    Baso # 0.09 0.00 - 0.20 K/uL    Gran% 59.8 38.0 - 73.0 %    Lymph% 24.3 18.0 - 48.0 %    Mono% 12.5 4.0 - 15.0 %    Eosinophil% 1.3 0.0 - 8.0 %    Basophil% 1.7 0.0 - 1.9 %    Differential Method Automated    Comprehensive metabolic panel    Collection Time: 07/13/17 10:53 AM   Result Value Ref Range    Sodium 139 136 - 145 mmol/L    Potassium 4.5 3.5 - 5.1 mmol/L    Chloride 110 95 - 110 mmol/L    CO2 19 (L) 23 - 29 mmol/L    Glucose 89 70 - 110 mg/dL    BUN, Bld 10 6 - 20 mg/dL    Creatinine 0.9 " 0.5 - 1.4 mg/dL    Calcium 9.3 8.7 - 10.5 mg/dL    Total Protein 7.3 6.0 - 8.4 g/dL    Albumin 4.3 3.5 - 5.2 g/dL    Total Bilirubin 0.7 0.1 - 1.0 mg/dL    Alkaline Phosphatase 48 (L) 55 - 135 U/L    AST 37 10 - 40 U/L    ALT 42 10 - 44 U/L    Anion Gap 10 8 - 16 mmol/L    eGFR if African American >60 >60 mL/min/1.73 m^2    eGFR if non African American >60 >60 mL/min/1.73 m^2   Ethanol    Collection Time: 07/13/17 10:53 AM   Result Value Ref Range    Alcohol, Medical, Serum <10 <10 mg/dL   TSH    Collection Time: 07/13/17 10:53 AM   Result Value Ref Range    TSH 0.506 0.400 - 4.000 uIU/mL   Troponin I    Collection Time: 07/13/17 10:53 AM   Result Value Ref Range    Troponin I <0.006 0.000 - 0.026 ng/mL   Magnesium    Collection Time: 07/13/17 10:53 AM   Result Value Ref Range    Magnesium 2.8 (H) 1.6 - 2.6 mg/dL   CPK    Collection Time: 07/13/17 10:53 AM   Result Value Ref Range     20 - 200 U/L   Phosphorus    Collection Time: 07/13/17 10:53 AM   Result Value Ref Range    Phosphorus 3.0 2.7 - 4.5 mg/dL   T4, free    Collection Time: 07/13/17 10:53 AM   Result Value Ref Range    Free T4 0.77 0.71 - 1.51 ng/dL   Urinalysis - Clean Catch    Collection Time: 07/13/17 10:57 AM   Result Value Ref Range    Specimen UA Urine, Clean Catch     Color, UA Yellow Yellow, Straw, Christiane    Appearance, UA Clear Clear    pH, UA 6.0 5.0 - 8.0    Specific Gravity, UA 1.010 1.005 - 1.030    Protein, UA Negative Negative    Glucose, UA Negative Negative    Ketones, UA Negative Negative    Bilirubin (UA) Negative Negative    Occult Blood UA Negative Negative    Nitrite, UA Negative Negative    Urobilinogen, UA Negative <2.0 EU/dL    Leukocytes, UA Negative Negative   Drug screen panel, emergency    Collection Time: 07/13/17 10:57 AM   Result Value Ref Range    Benzodiazepines Negative     Methadone metabolites Negative     Cocaine (Metab.) Negative     Opiate Scrn, Ur Negative     Barbiturate Screen, Ur Presumptive Positive      Amphetamine Screen, Ur Negative     THC Negative     Phencyclidine Negative     Creatinine, Random Ur 47.1 23.0 - 375.0 mg/dL    Toxicology Information SEE COMMENT    Cardiac treadmill stress test    Collection Time: 07/20/17  8:00 AM   Result Value Ref Range    Diastolic Dysfunction No    2D Echo w/ Color Flow Doppler    Collection Time: 07/28/17 11:00 AM   Result Value Ref Range    EF 65 55 - 65    Mitral Valve Regurgitation TRIVIAL     Diastolic Dysfunction No     Est. PA Systolic Pressure 38.28     Tricuspid Valve Regurgitation MILD    Lipid panel    Collection Time: 08/09/17  7:51 AM   Result Value Ref Range    Cholesterol 233 (H) 120 - 199 mg/dL    Triglycerides 130 30 - 150 mg/dL    HDL 57 40 - 75 mg/dL    LDL Cholesterol 150.0 63.0 - 159.0 mg/dL    HDL/Chol Ratio 24.5 20.0 - 50.0 %    Total Cholesterol/HDL Ratio 4.1 2.0 - 5.0    Non-HDL Cholesterol 176 mg/dL       Imaging:  Imaging Results    None     Discussed CTA with SPN      Assessment:    ICD-10-CM ICD-9-CM    1. Bilateral thigh pain M79.651 729.5     M79.652     2. SALMA (obstructive sleep apnea) G47.33 327.23 Polysomnography 4 or more parameters   3. RLS (restless legs syndrome) G25.81 333.94 Polysomnography 4 or more parameters   4. Numbness and tingling of both feet R20.0 782.0     R20.2     5. Essential hypertension I10 401.9    6. Tobacco use Z72.0 305.1    7. SOB (shortness of breath) R06.02 786.05 Complete PFT w/o bronchodilator     The primary encounter diagnosis was Bilateral thigh pain. Diagnoses of SALMA (obstructive sleep apnea), RLS (restless legs syndrome), Numbness and tingling of both feet, Essential hypertension, Tobacco use, and SOB (shortness of breath) were also pertinent to this visit.      Plan:  Orders Placed This Encounter   Procedures    Polysomnography 4 or more parameters    Complete PFT w/o bronchodilator     Will have pt return, if still with issues may consider adding anxiolytic - cymbalta?  Increase Zestoretic to  20/12.5  Quit tob!      Jaquan Huffman MD

## 2017-08-22 ENCOUNTER — PATIENT MESSAGE (OUTPATIENT)
Dept: FAMILY MEDICINE | Facility: CLINIC | Age: 37
End: 2017-08-22

## 2017-08-23 ENCOUNTER — PATIENT MESSAGE (OUTPATIENT)
Dept: FAMILY MEDICINE | Facility: CLINIC | Age: 37
End: 2017-08-23

## 2017-08-24 ENCOUNTER — PATIENT MESSAGE (OUTPATIENT)
Dept: FAMILY MEDICINE | Facility: CLINIC | Age: 37
End: 2017-08-24

## 2017-08-24 ENCOUNTER — TELEPHONE (OUTPATIENT)
Dept: SLEEP MEDICINE | Facility: OTHER | Age: 37
End: 2017-08-24

## 2017-08-24 ENCOUNTER — HOSPITAL ENCOUNTER (OUTPATIENT)
Dept: PULMONOLOGY | Facility: HOSPITAL | Age: 37
Discharge: HOME | End: 2017-08-24
Attending: INTERNAL MEDICINE
Payer: COMMERCIAL

## 2017-08-24 DIAGNOSIS — R06.02 SOB (SHORTNESS OF BREATH): ICD-10-CM

## 2017-08-24 PROCEDURE — 94060 EVALUATION OF WHEEZING: CPT

## 2017-08-24 PROCEDURE — 94729 DIFFUSING CAPACITY: CPT

## 2017-08-24 PROCEDURE — 94726 PLETHYSMOGRAPHY LUNG VOLUMES: CPT

## 2017-08-25 RX ORDER — TIOTROPIUM BROMIDE 18 UG/1
18 CAPSULE ORAL; RESPIRATORY (INHALATION) DAILY
Qty: 90 CAPSULE | Refills: 3 | Status: SHIPPED | OUTPATIENT
Start: 2017-08-25 | End: 2017-09-21 | Stop reason: SDUPTHER

## 2017-08-25 NOTE — PROCEDURES
[facility] Pulmonary Function Test Interpretation    - This test meets ATS criteria for acceptability and repeatability.    - There is evidence of moderate (FEV1 >50 to <80% of predicted) airflow obstruction    - Following administration of bronchodilators, there is no significant response.    - There is no evidence of air trapping.    - There is no significant restriction by total lung volume measurement    - The DLCO uncorrected for hemoglobin is mildly reduced.    Impression: Findings are consistent with chronic obstructive pulmonary disease with moderate airflow obstruction. Lack of significant response to bronchodilators on this test does not preclude potential benefit from treatment with an inhaled beta-agonist.    Todd Pittman MD  Pulmonary/CCM Fellow

## 2017-08-30 ENCOUNTER — HOSPITAL ENCOUNTER (OUTPATIENT)
Dept: SLEEP MEDICINE | Facility: HOSPITAL | Age: 37
Discharge: HOME | End: 2017-08-30
Attending: INTERNAL MEDICINE
Payer: COMMERCIAL

## 2017-08-30 DIAGNOSIS — G25.81 RLS (RESTLESS LEGS SYNDROME): ICD-10-CM

## 2017-08-30 DIAGNOSIS — G47.33 OSA (OBSTRUCTIVE SLEEP APNEA): ICD-10-CM

## 2017-08-30 PROCEDURE — 95811 POLYSOM 6/>YRS CPAP 4/> PARM: CPT

## 2017-08-30 PROCEDURE — 95811 POLYSOM 6/>YRS CPAP 4/> PARM: CPT | Mod: 26,,, | Performed by: PSYCHIATRY & NEUROLOGY

## 2017-08-30 PROCEDURE — 95811 PR POLYSOMNOGRAPHY W/CPAP: ICD-10-PCS | Mod: 26,,, | Performed by: PSYCHIATRY & NEUROLOGY

## 2017-08-31 NOTE — PROGRESS NOTES
"Erik Mims is a 37 yr old 74 in 191 lb ambulatory male admitted to the lab for a PSG and possible split study. He was oriented to the call system, purpose of the wires, and the questionaire was completed.    His PMH includes:tobbaco user, SOB,.    The patient described his usual night as up and down, sleeps for about 2 hours then he is up. Sometimes he can go back to sleep and other times he can't. He stated he is tired and fatigued. He says he will wake up suddenly and he has trouble catching his breathe. The tech explained that some of the signs of SALMA are tiredness, fatigue and gasping for air. Explained with the PSG we will be looking to see if he has any respiratory events that may be related to the gasping for air, leg movements or snoring that may be waking him up.He does state he smokes immediately prior to sleep and will wake up and smoke. He does state he is working on this.    The critera for a split study were discussed. He appeared willing to try cpap if neded.    2225 Lights off    2236 moved bundle blocking  2243 moved HOB down added pillow  2447 leg thick added notch  0145 Lights on . Pt has met criteria hypopnea apneas desaturations low 90's.    0209 Lights off start cpap medium Eson nasal mask heat 3 chin strap c-flex 3   fixed legs, chin , c3/c4 checked belts.  0307 RERAS cpap 5  0318 RERAs cpap 6  44121 appneas cpap 7  0347 arousals cpap 8  0354 arousals cpap 9  0415 centra1  apneas 10  0435 11 increased above optimal to observe  0445 decrease to 10 to observe for optimal   0556 Lights on    The ekg revealed: NSR    The optimal supine REM pressure is: 10 cm    The patient felt the cpap and mask were":It is alright  I have to get use to it I guess. It was alot of pressure.".    He stated he was not too drowsy to drive home.          "

## 2017-09-11 ENCOUNTER — PATIENT MESSAGE (OUTPATIENT)
Dept: FAMILY MEDICINE | Facility: CLINIC | Age: 37
End: 2017-09-11

## 2017-09-11 ENCOUNTER — OFFICE VISIT (OUTPATIENT)
Dept: FAMILY MEDICINE | Facility: CLINIC | Age: 37
End: 2017-09-11
Payer: COMMERCIAL

## 2017-09-11 VITALS
HEART RATE: 109 BPM | HEIGHT: 74 IN | WEIGHT: 192.88 LBS | TEMPERATURE: 99 F | OXYGEN SATURATION: 98 % | BODY MASS INDEX: 24.75 KG/M2 | DIASTOLIC BLOOD PRESSURE: 66 MMHG | SYSTOLIC BLOOD PRESSURE: 104 MMHG

## 2017-09-11 DIAGNOSIS — R53.1 WEAKNESS: ICD-10-CM

## 2017-09-11 DIAGNOSIS — G25.81 RLS (RESTLESS LEGS SYNDROME): ICD-10-CM

## 2017-09-11 DIAGNOSIS — R06.02 SOB (SHORTNESS OF BREATH): ICD-10-CM

## 2017-09-11 DIAGNOSIS — R07.89 CHEST TIGHTNESS OR PRESSURE: ICD-10-CM

## 2017-09-11 DIAGNOSIS — J44.9 CHRONIC OBSTRUCTIVE PULMONARY DISEASE, UNSPECIFIED COPD TYPE: ICD-10-CM

## 2017-09-11 DIAGNOSIS — R55 NEAR SYNCOPE: ICD-10-CM

## 2017-09-11 DIAGNOSIS — G47.33 OSA (OBSTRUCTIVE SLEEP APNEA): Primary | ICD-10-CM

## 2017-09-11 DIAGNOSIS — R10.13 EPIGASTRIC PAIN: ICD-10-CM

## 2017-09-11 DIAGNOSIS — I10 ESSENTIAL HYPERTENSION: ICD-10-CM

## 2017-09-11 DIAGNOSIS — R53.83 FATIGUE, UNSPECIFIED TYPE: ICD-10-CM

## 2017-09-11 PROCEDURE — 99214 OFFICE O/P EST MOD 30 MIN: CPT | Mod: S$GLB,,, | Performed by: INTERNAL MEDICINE

## 2017-09-11 PROCEDURE — 3008F BODY MASS INDEX DOCD: CPT | Mod: S$GLB,,, | Performed by: INTERNAL MEDICINE

## 2017-09-11 RX ORDER — PANTOPRAZOLE SODIUM 40 MG/1
40 TABLET, DELAYED RELEASE ORAL DAILY
Qty: 30 TABLET | Refills: 11 | Status: SHIPPED | OUTPATIENT
Start: 2017-09-11 | End: 2017-10-13 | Stop reason: SDUPTHER

## 2017-09-18 ENCOUNTER — LAB VISIT (OUTPATIENT)
Dept: LAB | Facility: HOSPITAL | Age: 37
End: 2017-09-18
Attending: INTERNAL MEDICINE
Payer: COMMERCIAL

## 2017-09-18 DIAGNOSIS — I10 ESSENTIAL HYPERTENSION: ICD-10-CM

## 2017-09-18 DIAGNOSIS — R55 NEAR SYNCOPE: ICD-10-CM

## 2017-09-18 DIAGNOSIS — R07.89 CHEST TIGHTNESS OR PRESSURE: ICD-10-CM

## 2017-09-18 DIAGNOSIS — R06.02 SOB (SHORTNESS OF BREATH): ICD-10-CM

## 2017-09-18 DIAGNOSIS — R53.1 WEAKNESS: ICD-10-CM

## 2017-09-18 DIAGNOSIS — R10.13 EPIGASTRIC PAIN: ICD-10-CM

## 2017-09-18 DIAGNOSIS — R53.83 FATIGUE, UNSPECIFIED TYPE: ICD-10-CM

## 2017-09-18 PROCEDURE — 82570 ASSAY OF URINE CREATININE: CPT

## 2017-09-21 ENCOUNTER — OFFICE VISIT (OUTPATIENT)
Dept: GASTROENTEROLOGY | Facility: CLINIC | Age: 37
End: 2017-09-21
Payer: COMMERCIAL

## 2017-09-21 VITALS
WEIGHT: 188.94 LBS | DIASTOLIC BLOOD PRESSURE: 65 MMHG | HEIGHT: 74 IN | BODY MASS INDEX: 24.25 KG/M2 | SYSTOLIC BLOOD PRESSURE: 120 MMHG

## 2017-09-21 DIAGNOSIS — R10.13 ABDOMINAL PAIN, EPIGASTRIC: Primary | ICD-10-CM

## 2017-09-21 DIAGNOSIS — R10.11 ABDOMINAL PAIN, RUQ (RIGHT UPPER QUADRANT): ICD-10-CM

## 2017-09-21 DIAGNOSIS — R11.0 NAUSEA: ICD-10-CM

## 2017-09-21 PROCEDURE — 99999 PR PBB SHADOW E&M-EST. PATIENT-LVL III: CPT | Mod: PBBFAC,,, | Performed by: NURSE PRACTITIONER

## 2017-09-21 PROCEDURE — 3008F BODY MASS INDEX DOCD: CPT | Mod: S$GLB,,, | Performed by: NURSE PRACTITIONER

## 2017-09-21 PROCEDURE — 99204 OFFICE O/P NEW MOD 45 MIN: CPT | Mod: S$GLB,,, | Performed by: NURSE PRACTITIONER

## 2017-09-21 NOTE — LETTER
September 21, 2017      Jaquan Huffman MD  [address]  [address]  [place] [id number]           [place] [facility]  [address]  [place] [address]  Phone: [phone]          Patient: Erik Mims   MR Number: [id number]   YOB: 1980   Date of Visit: 9/21/2017       Dear Dr. Jaquan Huffman:    Thank you for referring Erik Mims to me for evaluation. Attached you will find relevant portions of my assessment and plan of care.    If you have questions, please do not hesitate to call me. I look forward to following Erik Mims along with you.    Sincerely,    Mercedes Roy, [facility]    Enclosure  CC:  No Recipients    If you would like to receive this communication electronically, please contact [email or web address] or [phone] to request more information on [employer] Link access.    For providers and/or their staff who would like to refer a patient to [facility], please contact us through our one-stop-shop provider referral line, [facility], at [phone].    If you feel you have received this communication in error or would no longer like to receive these types of communications, please e-mail [email or web address]

## 2017-09-21 NOTE — PROGRESS NOTES
"Subjective:       Patient ID: Erik Mims is a 37 y.o. male.    Chief Complaint: Abdominal Pain    HPI Reports beginning on July 13 he has had episodic 'fainting" episodes.  These have been associated with abdominal pain, nausea, fatigue, weakness.   He reports that complaints are now daily.  Not associated with any particular foods.  Not associated with bowel movements. Denies diarrhea, constipation, blood with bowel movements or black stools.    He has been to the ED twice for these complaints with no findings on presentation. He has had unremarkable labs.      Reports his symptoms prior to a syncopal or near syncopal episode will consist of intestinal cramping and then intense hunger in the epigastrum and then a sense of constriction in the throat.    He reports that in general, he will waken feeling well and then will have nausea when getting up.  No vomiting.   He will have extreme hunger pain in the epigastrum mid morning.  1-2 hours after eating lunch he describes fatigue, weakness and decreased energy.  He will have abdominal pain and flushing.  Pain will come from the lower abdomen and then the hunger pain will be in the epigastric area.  He will have some relief if he then eats, but cycle will start again with pain beginnign 1-2 hours after eating.    Denies heartburn or acid reflux.  Denies family GI history.    Review of Systems   Constitutional: Positive for appetite change and fatigue. Negative for activity change, fever and unexpected weight change.   HENT: Positive for sore throat.    Respiratory: Negative for shortness of breath.    Cardiovascular: Negative for chest pain.   Gastrointestinal: Positive for abdominal pain and nausea. Negative for abdominal distention, blood in stool, constipation, diarrhea and vomiting.   Genitourinary: Negative.    Musculoskeletal: Negative.    Skin: Negative.    Neurological: Positive for dizziness, weakness and light-headedness.   Psychiatric/Behavioral: " Negative.        Objective:      Physical Exam   Constitutional: He is oriented to person, place, and time. He appears well-developed and well-nourished. No distress.   HENT:   Head: Normocephalic.   Eyes: No scleral icterus.   Neck: Neck supple.   Cardiovascular: Normal rate.    Pulmonary/Chest: Effort normal. No respiratory distress.   Abdominal: Soft. He exhibits no distension and no mass. Bowel sounds are increased. There is tenderness in the right upper quadrant and epigastric area. There is no guarding.   Musculoskeletal: Normal range of motion.   Neurological: He is alert and oriented to person, place, and time.   Skin: Skin is warm and dry. He is not diaphoretic.   Psychiatric: He has a normal mood and affect. His behavior is normal. Thought content normal.   Vitals reviewed.      Assessment:       1. Abdominal pain, epigastric    2. Abdominal pain, RUQ (right upper quadrant)    3. Nausea        Plan:         Erik was seen today for abdominal pain.    Diagnoses and all orders for this visit:    Abdominal pain, epigastric  -     US Abdomen Complete; Future    Abdominal pain, RUQ (right upper quadrant)    Nausea    Will schedule abdominal US and EGD.  He will call back to schedule EGD.  Add probiotic.  Could consider CTA depending upon findings and symptoms

## 2017-09-22 RX ORDER — TIOTROPIUM BROMIDE 18 UG/1
18 CAPSULE ORAL; RESPIRATORY (INHALATION) DAILY
Qty: 90 CAPSULE | Refills: 3 | Status: SHIPPED | OUTPATIENT
Start: 2017-09-22 | End: 2017-11-09 | Stop reason: SDUPTHER

## 2017-09-22 RX ORDER — ROPINIROLE 0.25 MG/1
0.25 TABLET, FILM COATED ORAL 3 TIMES DAILY
Qty: 90 TABLET | Refills: 11 | Status: SHIPPED | OUTPATIENT
Start: 2017-09-22 | End: 2018-02-20 | Stop reason: SDUPTHER

## 2017-09-22 RX ORDER — LISINOPRIL AND HYDROCHLOROTHIAZIDE 12.5; 2 MG/1; MG/1
1 TABLET ORAL DAILY
Qty: 90 TABLET | Refills: 3 | Status: SHIPPED | OUTPATIENT
Start: 2017-09-22 | End: 2017-10-25 | Stop reason: SDUPTHER

## 2017-09-25 LAB — 5OH-INDOLEACETATE 24H UR-MRATE: NORMAL MG/(24.H)

## 2017-09-27 ENCOUNTER — TELEPHONE (OUTPATIENT)
Dept: GASTROENTEROLOGY | Facility: CLINIC | Age: 37
End: 2017-09-27

## 2017-09-27 ENCOUNTER — HOSPITAL ENCOUNTER (OUTPATIENT)
Dept: RADIOLOGY | Facility: HOSPITAL | Age: 37
Discharge: HOME | End: 2017-09-27
Attending: NURSE PRACTITIONER
Payer: COMMERCIAL

## 2017-09-27 ENCOUNTER — TELEPHONE (OUTPATIENT)
Dept: SURGERY | Facility: CLINIC | Age: 37
End: 2017-09-27

## 2017-09-27 DIAGNOSIS — R11.0 NAUSEA: Primary | ICD-10-CM

## 2017-09-27 DIAGNOSIS — R10.13 EPIGASTRIC ABDOMINAL PAIN: ICD-10-CM

## 2017-09-27 DIAGNOSIS — R10.11 ABDOMINAL PAIN, RUQ (RIGHT UPPER QUADRANT): ICD-10-CM

## 2017-09-27 DIAGNOSIS — R10.13 ABDOMINAL PAIN, EPIGASTRIC: ICD-10-CM

## 2017-09-27 PROCEDURE — 76700 US EXAM ABDOM COMPLETE: CPT | Mod: TC,PO

## 2017-09-27 NOTE — TELEPHONE ENCOUNTER
09/27/2017     1514  Contacted pt regarding him coming in earlier for his appt on tomorrow. Pt stated that he won't be able to come in earlier due to him not being able to take time off from work. Conformed appt for tomorrow. Pt verbalized understanding.

## 2017-09-27 NOTE — TELEPHONE ENCOUNTER
Informed pt of his US results.     Pt has been scheduled for an OV with General Surgery on 9/28-17 at 3:20pm for gallbladder polyps.     Pt will be placed on a 6 month recall for his US and Labs.    Pt has been scheduled for an EGD on 10/6/17 with Dr. Márquez. Pt stated that he has his prep and info. This has been given to him at his OV. He will call back if this day does not work well for him, because he will need to find a ride.   Verbal Understanding.

## 2017-09-28 ENCOUNTER — OFFICE VISIT (OUTPATIENT)
Dept: SURGERY | Facility: CLINIC | Age: 37
End: 2017-09-28
Payer: COMMERCIAL

## 2017-09-28 VITALS
DIASTOLIC BLOOD PRESSURE: 98 MMHG | SYSTOLIC BLOOD PRESSURE: 146 MMHG | HEIGHT: 74 IN | BODY MASS INDEX: 24.93 KG/M2 | HEART RATE: 87 BPM | OXYGEN SATURATION: 98 % | WEIGHT: 194.25 LBS | TEMPERATURE: 99 F

## 2017-09-28 DIAGNOSIS — K82.4 GALLBLADDER POLYP: ICD-10-CM

## 2017-09-28 PROCEDURE — 99999 PR PBB SHADOW E&M-EST. PATIENT-LVL III: CPT | Mod: PBBFAC,,, | Performed by: STUDENT IN AN ORGANIZED HEALTH CARE EDUCATION/TRAINING PROGRAM

## 2017-09-28 PROCEDURE — 3008F BODY MASS INDEX DOCD: CPT | Mod: S$GLB,,, | Performed by: STUDENT IN AN ORGANIZED HEALTH CARE EDUCATION/TRAINING PROGRAM

## 2017-09-28 PROCEDURE — 99203 OFFICE O/P NEW LOW 30 MIN: CPT | Mod: S$GLB,,, | Performed by: STUDENT IN AN ORGANIZED HEALTH CARE EDUCATION/TRAINING PROGRAM

## 2017-09-28 NOTE — PROGRESS NOTES
History & Physical    SUBJECTIVE:     History of Present Illness:  Patient is a 37 y.o. male presents with episodes of dizziness, weakness that began on July 13th. He had never had anything like this before. He states that he was at work sitting on a boat when he began to feel weak like he couldn't hold himself up. Some mild dizziness was associated.  He reports a vague hunger type pain associated with this that improves with eating. No vomiting, nausea, diarrhea. Maybe some mild diaphoresis. No fevers, chest pain, SOB, headaches. These episodes last maybe a few hours sometimes and are almost daily.  He feels normal between these episodes. Denies any family history of any type of cancer that he knows about.     Chief Complaint   Patient presents with    Consult       Review of patient's allergies indicates:  No Known Allergies    Current Outpatient Prescriptions   Medication Sig Dispense Refill    aspirin (ECOTRIN) 81 MG EC tablet Take 1 tablet (81 mg total) by mouth once daily. 30 tablet 12    lisinopril-hydrochlorothiazide (PRINZIDE,ZESTORETIC) 20-12.5 mg per tablet Take 1 tablet by mouth once daily. 90 tablet 3    pantoprazole (PROTONIX) 40 MG tablet Take 1 tablet (40 mg total) by mouth once daily. 30 tablet 11    ropinirole (REQUIP) 0.25 MG tablet Take 1 tablet (0.25 mg total) by mouth 3 (three) times daily. 90 tablet 11    tiotropium (SPIRIVA) 18 mcg inhalation capsule Inhale 1 capsule (18 mcg total) into the lungs once daily. Controller 90 capsule 3    nitroGLYCERIN (NITROSTAT) 0.4 MG SL tablet Place 1 tablet (0.4 mg total) under the tongue every 5 (five) minutes as needed for Chest pain. 20 tablet 12     No current facility-administered medications for this visit.        No past medical history on file.  Past Surgical History:   Procedure Laterality Date    LUNG SURGERY       No family history on file.  Social History   Substance Use Topics    Smoking status: Current Every Day Smoker    Smokeless  "tobacco: Never Used    Alcohol use Yes        Review of Systems:  Review of Systems   Constitutional: Positive for appetite change, diaphoresis and fatigue. Negative for activity change, chills and fever.   Eyes: Negative for visual disturbance.   Respiratory: Negative for apnea, cough, choking, chest tightness, shortness of breath and wheezing.    Cardiovascular: Negative for chest pain and leg swelling.   Gastrointestinal: Negative for abdominal distention, abdominal pain, anal bleeding, blood in stool, constipation, diarrhea, nausea and vomiting.   Skin: Negative for rash.   Neurological: Positive for weakness and light-headedness. Negative for numbness and headaches.   Psychiatric/Behavioral: Negative for confusion. The patient is not nervous/anxious.        OBJECTIVE:     Vital Signs (Most Recent)  Temp: 99.2 °F (37.3 °C) (09/28/17 1533)  Pulse: 87 (09/28/17 1533)  BP: (!) 146/98 (09/28/17 1536)  SpO2: 98 % (09/28/17 1533)  6' 2" (1.88 m)  88.1 kg (194 lb 3.6 oz)     Physical Exam:  Physical Exam   Constitutional: He is oriented to person, place, and time. He appears well-developed and well-nourished. No distress.   HENT:   Head: Normocephalic and atraumatic.   Eyes: Conjunctivae are normal. Pupils are equal, round, and reactive to light. No scleral icterus.   Neck: Normal range of motion. Neck supple.   Cardiovascular: Normal rate and regular rhythm.    Pulmonary/Chest: Effort normal and breath sounds normal. No stridor. No respiratory distress. He has no wheezes.   Abdominal: Soft. Bowel sounds are normal. He exhibits no distension and no mass. There is no tenderness. There is no rebound and no guarding. No hernia.   Musculoskeletal: He exhibits no edema.   Lymphadenopathy:     He has no cervical adenopathy.   Neurological: He is alert and oriented to person, place, and time.   Skin: Skin is warm and dry. No rash noted. He is not diaphoretic. No erythema.   Psychiatric: He has a normal mood and affect. His " behavior is normal.       Laboratory  Labs normal for the most part. Urinary 5HIAA normal. No other neuroendocrine labs drawn yet    Diagnostic Results:  US shows 7mm gallbladder polyp, no stones    ASSESSMENT/PLAN:     37M with episodes of weakness, possible hypoglycemic episodes?? CBC, CMP, thyroid labs unremarkable. Does have 7mm polyp in gallbladder. unlikey responsible for his symptoms. Would be ok to monitor with repeat US in 6 months but his story is more concerning. Has EGD scheduled next week. Definitely think it would be worthwhile to do EUS to look for neuroendocrine pathology.     PLAN:Plan   Will see him back here in 3 weeks after EGD and results

## 2017-09-28 NOTE — LETTER
September 28, 2017      Mercedes Roy, [facility]  [address]  [place] [address]           [facility]  [address]  [address]  [place] [address]  Phone: [phone]          Patient: Erik Mims   MR Number: [id number]   YOB: 1980   Date of Visit: 9/28/2017       Dear Mercedes Roy:    Thank you for referring Erik Mims to me for evaluation. Attached you will find relevant portions of my assessment and plan of care.    If you have questions, please do not hesitate to call me. I look forward to following Erik Mims along with you.    Sincerely,    Dylan Licona MD    Enclosure  CC:  No Recipients    If you would like to receive this communication electronically, please contact [email or web address] or [phone] to request more information on [employer] Link access.    For providers and/or their staff who would like to refer a patient to [facility], please contact us through our one-stop-shop provider referral line, [facility], at [phone].    If you feel you have received this communication in error or would no longer like to receive these types of communications, please e-mail [email or web address]

## 2017-10-09 ENCOUNTER — TELEPHONE (OUTPATIENT)
Dept: SURGERY | Facility: CLINIC | Age: 37
End: 2017-10-09

## 2017-10-09 DIAGNOSIS — R10.13 EPIGASTRIC PAIN: Primary | ICD-10-CM

## 2017-10-09 DIAGNOSIS — J44.9 CHRONIC OBSTRUCTIVE PULMONARY DISEASE, UNSPECIFIED COPD TYPE: ICD-10-CM

## 2017-10-09 NOTE — TELEPHONE ENCOUNTER
"----- Message from Heather Pruitt sent at 10/9/2017  2:53 PM CDT -----  Contact: [phone]/self  Patient requesting to speak with you regarding rescheduling procedure. Please advise.    10/09/17    1512  Patient called regarding EGD. Patient states, "I need to do it on a Friday. 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 Márquez's staff regarding request. Patient verbalized understanding.        "

## 2017-10-10 NOTE — TELEPHONE ENCOUNTER
Patient called to reschedule his EGD 11/03/2017 with Dr. Márquez in [place], prep information was given to the patient at previous OV.

## 2017-10-13 DIAGNOSIS — R10.9 ABDOMINAL PAIN, UNSPECIFIED ABDOMINAL LOCATION: Primary | ICD-10-CM

## 2017-10-13 NOTE — TELEPHONE ENCOUNTER
Patient requesting refill of Protonix. Last plan note (9/11/2017) stated for the patient to return for a visit in 1 month. No appointments scheduled as of yet.

## 2017-10-15 RX ORDER — PANTOPRAZOLE SODIUM 40 MG/1
40 TABLET, DELAYED RELEASE ORAL DAILY
Qty: 30 TABLET | Refills: 11 | Status: SHIPPED | OUTPATIENT
Start: 2017-10-15 | End: 2018-02-20 | Stop reason: SDUPTHER

## 2017-10-16 NOTE — TELEPHONE ENCOUNTER
Spoke with patient. Scheduled follow up appointment. He reports he did not want to come in until he was able to have his endoscopy, which is scheduled on 11/10. Scheduled appointment for 11/20. Gave him the location and directions. He stated he will also check the patient portal closer to his appointment date. He had no further questions.

## 2017-10-25 NOTE — TELEPHONE ENCOUNTER
lisinopril-hydrochlorothiazide (PRINZIDE,ZESTORETIC) 20-12.5 mg per tablet [Jaquan Huffman MD]       Patient Comment: Empty     Preferred pharmacy: [id number] - [place]ZAN[place] HENDERSON - [address] AT [facility] [address]

## 2017-10-26 RX ORDER — LISINOPRIL AND HYDROCHLOROTHIAZIDE 12.5; 2 MG/1; MG/1
1 TABLET ORAL DAILY
Qty: 90 TABLET | Refills: 3 | Status: SHIPPED | OUTPATIENT
Start: 2017-10-26 | End: 2018-02-20 | Stop reason: SDUPTHER

## 2017-11-03 ENCOUNTER — HOSPITAL ENCOUNTER (OUTPATIENT)
Facility: HOSPITAL | Age: 37
Discharge: HOME | End: 2017-11-03
Attending: INTERNAL MEDICINE | Admitting: INTERNAL MEDICINE
Payer: COMMERCIAL

## 2017-11-03 ENCOUNTER — ANESTHESIA (OUTPATIENT)
Dept: ENDOSCOPY | Facility: HOSPITAL | Age: 37
End: 2017-11-03
Payer: COMMERCIAL

## 2017-11-03 ENCOUNTER — ANESTHESIA EVENT (OUTPATIENT)
Dept: ENDOSCOPY | Facility: HOSPITAL | Age: 37
End: 2017-11-03
Payer: COMMERCIAL

## 2017-11-03 ENCOUNTER — TELEPHONE (OUTPATIENT)
Dept: GASTROENTEROLOGY | Facility: CLINIC | Age: 37
End: 2017-11-03

## 2017-11-03 DIAGNOSIS — Z12.12 SCREENING FOR COLORECTAL CANCER: ICD-10-CM

## 2017-11-03 DIAGNOSIS — Z12.11 SCREENING FOR COLORECTAL CANCER: ICD-10-CM

## 2017-11-03 DIAGNOSIS — R55 NEAR SYNCOPE: ICD-10-CM

## 2017-11-03 DIAGNOSIS — R10.13 EPIGASTRIC PAIN: Primary | ICD-10-CM

## 2017-11-03 PROCEDURE — 43239 EGD BIOPSY SINGLE/MULTIPLE: CPT | Mod: ,,, | Performed by: INTERNAL MEDICINE

## 2017-11-03 PROCEDURE — 25000003 PHARM REV CODE 250: Performed by: NURSE ANESTHETIST, CERTIFIED REGISTERED

## 2017-11-03 PROCEDURE — 88305 TISSUE EXAM BY PATHOLOGIST: CPT | Mod: 26,,, | Performed by: PATHOLOGY

## 2017-11-03 PROCEDURE — 25000003 PHARM REV CODE 250: Performed by: INTERNAL MEDICINE

## 2017-11-03 PROCEDURE — 88305 TISSUE EXAM BY PATHOLOGIST: CPT | Performed by: PATHOLOGY

## 2017-11-03 PROCEDURE — 37000009 HC ANESTHESIA EA ADD 15 MINS: Performed by: INTERNAL MEDICINE

## 2017-11-03 PROCEDURE — 27201012 HC FORCEPS, HOT/COLD, DISP: Performed by: INTERNAL MEDICINE

## 2017-11-03 PROCEDURE — 37000008 HC ANESTHESIA 1ST 15 MINUTES: Performed by: INTERNAL MEDICINE

## 2017-11-03 PROCEDURE — 63600175 PHARM REV CODE 636 W HCPCS: Performed by: NURSE ANESTHETIST, CERTIFIED REGISTERED

## 2017-11-03 PROCEDURE — 43239 EGD BIOPSY SINGLE/MULTIPLE: CPT | Performed by: INTERNAL MEDICINE

## 2017-11-03 RX ORDER — LIDOCAINE HCL/PF 100 MG/5ML
SYRINGE (ML) INTRAVENOUS
Status: DISCONTINUED | OUTPATIENT
Start: 2017-11-03 | End: 2017-11-03

## 2017-11-03 RX ORDER — PROPOFOL 10 MG/ML
VIAL (ML) INTRAVENOUS
Status: DISCONTINUED | OUTPATIENT
Start: 2017-11-03 | End: 2017-11-03

## 2017-11-03 RX ORDER — MIDAZOLAM HYDROCHLORIDE 1 MG/ML
INJECTION, SOLUTION INTRAMUSCULAR; INTRAVENOUS
Status: DISCONTINUED | OUTPATIENT
Start: 2017-11-03 | End: 2017-11-03

## 2017-11-03 RX ORDER — SODIUM CHLORIDE 9 MG/ML
INJECTION, SOLUTION INTRAVENOUS CONTINUOUS
Status: DISCONTINUED | OUTPATIENT
Start: 2017-11-04 | End: 2017-11-03 | Stop reason: HOSPADM

## 2017-11-03 RX ORDER — GLYCOPYRROLATE 0.2 MG/ML
INJECTION INTRAMUSCULAR; INTRAVENOUS
Status: DISCONTINUED | OUTPATIENT
Start: 2017-11-03 | End: 2017-11-03

## 2017-11-03 RX ADMIN — GLYCOPYRROLATE 0.2 MG: 0.2 INJECTION, SOLUTION INTRAMUSCULAR; INTRAVENOUS at 12:11

## 2017-11-03 RX ADMIN — MIDAZOLAM 2 MG: 1 INJECTION INTRAMUSCULAR; INTRAVENOUS at 12:11

## 2017-11-03 RX ADMIN — PROPOFOL 50 MG: 10 INJECTION, EMULSION INTRAVENOUS at 12:11

## 2017-11-03 RX ADMIN — LIDOCAINE HYDROCHLORIDE 80 MG: 20 INJECTION, SOLUTION INTRAVENOUS at 12:11

## 2017-11-03 RX ADMIN — PROPOFOL 30 MG: 10 INJECTION, EMULSION INTRAVENOUS at 12:11

## 2017-11-03 RX ADMIN — SODIUM CHLORIDE: 0.9 INJECTION, SOLUTION INTRAVENOUS at 11:11

## 2017-11-03 RX ADMIN — TOPICAL ANESTHETIC 1 EACH: 200 SPRAY DENTAL; PERIODONTAL at 12:11

## 2017-11-03 RX ADMIN — PROPOFOL 100 MG: 10 INJECTION, EMULSION INTRAVENOUS at 12:11

## 2017-11-03 NOTE — DISCHARGE INSTRUCTIONS
Post EGD Discharge Instruction    Erik Mims  11/3/2017  Jayme Márquez Jr., *    RESTRICTIONS ON ACTIVITY:    -DO NOT drive a car, operate machinery or make critical decisions, or do activities that require coordination or balance for 24 hours.  -Following Day: Return to full activities including work.  -Diet: Eat and drink normally unless instructed otherwise.    TREATMENT FOR COMMON SIDE EFFECTS:  *Sore Throat - treat with throat lozenges, gargle with warm salt water.  *Mild abdominal pain & bloating- rest and take liquids only.    SYMPTOMS TO WATCH FOR AND REPORT TO YOUR PHYSICIAN:  1. Chills or fever occurring 24 hours after procedure.  2. Pain in chest.  3. SEVERE abdominal pain or bloating.  4. Rectal bleeding which could be maroon or black.    If you have any questions or problems, please call your Physician:    Jayme Márquez Jr., * Phone: ***    Lab Results: [phone]    If a complication or emergency situation arises and you are unable to reach your Physician - GO TO THE EMERGENCY ROOM.

## 2017-11-03 NOTE — ANESTHESIA POSTPROCEDURE EVALUATION
"Anesthesia Post Evaluation    Patient: Erik Mims    Procedure(s) Performed: Procedure(s) (LRB):  ESOPHAGOGASTRODUODENOSCOPY (EGD) (N/A)    Final Anesthesia Type: MAC  Patient location during evaluation: GI PACU  Patient participation: Yes- Able to Participate  Level of consciousness: awake and alert and oriented  Post-procedure vital signs: reviewed and stable  Pain management: adequate  Airway patency: patent  PONV status at discharge: No PONV  Anesthetic complications: no      Cardiovascular status: blood pressure returned to baseline, hemodynamically stable and stable  Respiratory status: room air, unassisted and spontaneous ventilation  Hydration status: euvolemic  Follow-up not needed.        Visit Vitals  BP (!) 149/88   Pulse 99   Temp 37 °C (98.6 °F)   Resp 18   Ht 6' 2" (1.88 m)   Wt 88.5 kg (195 lb)   SpO2 97%   BMI 25.04 kg/m²       Pain/Sonia Score: Pain Assessment Performed: Yes (11/3/2017 11:29 AM)  Presence of Pain: denies (11/3/2017 11:29 AM)      "

## 2017-11-03 NOTE — TRANSFER OF CARE
"Anesthesia Transfer of Care Note    Patient: Erik Mims    Procedure(s) Performed: Procedure(s) (LRB):  ESOPHAGOGASTRODUODENOSCOPY (EGD) (N/A)    Patient location: GI    Anesthesia Type: MAC    Transport from OR: Transported from OR on room air with adequate spontaneous ventilation    Post pain: adequate analgesia    Post assessment: no apparent anesthetic complications    Post vital signs: stable    Level of consciousness: awake, alert and oriented    Nausea/Vomiting: no nausea/vomiting    Complications: none    Transfer of care protocol was followed      Last vitals:   Visit Vitals  BP (!) 149/88   Pulse 99   Temp 37 °C (98.6 °F)   Resp 18   Ht 6' 2" (1.88 m)   Wt 88.5 kg (195 lb)   SpO2 97%   BMI 25.04 kg/m²     "

## 2017-11-03 NOTE — TELEPHONE ENCOUNTER
Spoke with pt to clear up the confusion about pts time. He stated that he was already at the hospital.

## 2017-11-03 NOTE — ANESTHESIA PREPROCEDURE EVALUATION
11/03/2017  Erik Mims is a 37 y.o., male for EGD under MAC. Pt has sleep apnea and is very anxious.    Anesthesia Evaluation     I have reviewed the Nursing Notes.   I have reviewed the Medications.     Review of Systems  Anesthesia Hx:  No problems with previous Anesthesia  Denies Personal Hx of Anesthesia complications.   Social:  Smoker, Alcohol Use    Cardiovascular:   Exercise tolerance: good Hypertension Neg cardiac w/u 2017   Pulmonary:   COPD Sleep Apnea        Physical Exam  General:  Well nourished       Chest/Lungs:  Chest/Lungs Clear    Heart/Vascular:  Heart Findings: Normal            Anesthesia Plan  Type of Anesthesia, risks & benefits discussed:  Anesthesia Type:  MAC  Patient's Preference:   Intra-op Monitoring Plan:   Intra-op Monitoring Plan Comments:   Post Op Pain Control Plan:   Post Op Pain Control Plan Comments:   Induction:    Beta Blocker:  Patient is not currently on a Beta-Blocker (No further documentation required).       Informed Consent: Patient understands risks and agrees with Anesthesia plan.  Questions answered. Anesthesia consent signed with patient.  ASA Score: 2     Day of Surgery Review of History & Physical:            Ready For Surgery From Anesthesia Perspective.

## 2017-11-03 NOTE — H&P
"Reports beginning on July 13 he has had episodic 'fainting" episodes.  These have been associated with abdominal pain, nausea, fatigue, weakness.   He reports that complaints are now daily.  Not associated with any particular foods.  Not associated with bowel movements. Denies diarrhea, constipation, blood with bowel movements or black stools.     He has been to the ED twice for these complaints with no findings on presentation. He has had unremarkable labs.      Reports his symptoms prior to a syncopal or near syncopal episode will consist of intestinal cramping and then intense hunger in the epigastrum and then a sense of constriction in the throat.     He reports that in general, he will waken feeling well and then will have nausea when getting up.  No vomiting.   He will have extreme hunger pain in the epigastrum mid morning.  1-2 hours after eating lunch he describes fatigue, weakness and decreased energy.  He will have abdominal pain and flushing.  Pain will come from the lower abdomen and then the hunger pain will be in the epigastric area.  He will have some relief if he then eats, but cycle will start again with pain beginnign 1-2 hours after eating.     Denies heartburn or acid reflux.  Denies family GI history.     Review of Systems   Constitutional: Positive for appetite change and fatigue. Negative for activity change, fever and unexpected weight change.   HENT: Positive for sore throat.    Respiratory: Negative for shortness of breath.    Cardiovascular: Negative for chest pain.   Gastrointestinal: Positive for abdominal pain and nausea. Negative for abdominal distention, blood in stool, constipation, diarrhea and vomiting.   Genitourinary: Negative.    Musculoskeletal: Negative.    Skin: Negative.    Neurological: Positive for dizziness, weakness and light-headedness.   Psychiatric/Behavioral: Negative.        Objective:      Physical Exam   Constitutional: He is oriented to person, place, and time. " He appears well-developed and well-nourished. No distress.   HENT:   Head: Normocephalic.   Eyes: No scleral icterus.   Neck: Neck supple.   Cardiovascular: Normal rate.    Pulmonary/Chest: Effort normal. No respiratory distress.   Abdominal: Soft. He exhibits no distension and no mass. Bowel sounds are increased. There is tenderness in the right upper quadrant and epigastric area. There is no guarding.   Musculoskeletal: Normal range of motion.   Neurological: He is alert and oriented to person, place, and time.   Skin: Skin is warm and dry. He is not diaphoretic.   Psychiatric: He has a normal mood and affect. His behavior is normal. Thought content normal.   Vitals reviewed.      Assessment:       1. Abdominal pain, epigastric    2. Abdominal pain, RUQ (right upper quadrant)    3. Nausea        Plan:         Erik was seen today for abdominal pain.     Diagnoses and all orders for this visit:     Abdominal pain, epigastric  -     US Abdomen Complete; Future     Abdominal pain, RUQ (right upper quadrant)     Nausea     Will schedule abdominal US and EGD.  He will call back to schedule EGD.  Add probiotic.  Could consider CTA depending upon findings and symptoms

## 2017-11-03 NOTE — TELEPHONE ENCOUNTER
----- Message from Maci Houston sent at 11/3/2017 10:45 AM CDT -----  Contact: Pt  Pt is scheduled today for a procedure.  Pt stated he was told to arrive at 10:30am, notes stated he was told 10am.  Pt is requesting to speak with a nurse because he hasn't been checked in yet.      Pt can be reached at [phone].      Thank you

## 2017-11-06 VITALS
HEART RATE: 96 BPM | OXYGEN SATURATION: 99 % | BODY MASS INDEX: 25.03 KG/M2 | TEMPERATURE: 98 F | DIASTOLIC BLOOD PRESSURE: 92 MMHG | RESPIRATION RATE: 16 BRPM | WEIGHT: 195 LBS | SYSTOLIC BLOOD PRESSURE: 128 MMHG | HEIGHT: 74 IN

## 2017-11-09 DIAGNOSIS — J44.9 CHRONIC OBSTRUCTIVE PULMONARY DISEASE, UNSPECIFIED COPD TYPE: Primary | ICD-10-CM

## 2017-11-10 RX ORDER — TIOTROPIUM BROMIDE 18 UG/1
18 CAPSULE ORAL; RESPIRATORY (INHALATION) DAILY
Qty: 90 CAPSULE | Refills: 0 | Status: SHIPPED | OUTPATIENT
Start: 2017-11-10 | End: 2019-05-07 | Stop reason: SDUPTHER

## 2017-11-15 ENCOUNTER — TELEPHONE (OUTPATIENT)
Dept: GASTROENTEROLOGY | Facility: CLINIC | Age: 37
End: 2017-11-15

## 2017-11-15 NOTE — TELEPHONE ENCOUNTER
----- Message from Jayme Márquez Jr., MD sent at 11/15/2017  9:53 AM CST -----  Gastric biopsies negative for H. pylori

## 2017-11-20 ENCOUNTER — OFFICE VISIT (OUTPATIENT)
Dept: INTERNAL MEDICINE | Facility: CLINIC | Age: 37
End: 2017-11-20
Payer: COMMERCIAL

## 2017-11-20 ENCOUNTER — LAB VISIT (OUTPATIENT)
Dept: LAB | Facility: OTHER | Age: 37
End: 2017-11-20
Attending: INTERNAL MEDICINE
Payer: COMMERCIAL

## 2017-11-20 VITALS
HEIGHT: 74 IN | BODY MASS INDEX: 25.36 KG/M2 | WEIGHT: 197.63 LBS | HEART RATE: 68 BPM | DIASTOLIC BLOOD PRESSURE: 70 MMHG | SYSTOLIC BLOOD PRESSURE: 112 MMHG

## 2017-11-20 DIAGNOSIS — G47.33 OSA (OBSTRUCTIVE SLEEP APNEA): ICD-10-CM

## 2017-11-20 DIAGNOSIS — F41.9 ANXIETY: ICD-10-CM

## 2017-11-20 DIAGNOSIS — R55 NEAR SYNCOPE: ICD-10-CM

## 2017-11-20 DIAGNOSIS — K82.4 GALLBLADDER POLYP: ICD-10-CM

## 2017-11-20 DIAGNOSIS — J44.9 CHRONIC OBSTRUCTIVE PULMONARY DISEASE, UNSPECIFIED COPD TYPE: ICD-10-CM

## 2017-11-20 DIAGNOSIS — I10 ESSENTIAL HYPERTENSION: ICD-10-CM

## 2017-11-20 DIAGNOSIS — R29.898 WEAKNESS OF BOTH LEGS: ICD-10-CM

## 2017-11-20 DIAGNOSIS — R29.898 WEAKNESS OF BOTH LEGS: Primary | ICD-10-CM

## 2017-11-20 PROBLEM — Z12.12 SCREENING FOR COLORECTAL CANCER: Status: RESOLVED | Noted: 2017-11-03 | Resolved: 2017-11-20

## 2017-11-20 PROBLEM — Z12.11 SCREENING FOR COLORECTAL CANCER: Status: RESOLVED | Noted: 2017-11-03 | Resolved: 2017-11-20

## 2017-11-20 PROBLEM — R94.39 EQUIVOCAL STRESS TEST: Status: RESOLVED | Noted: 2017-07-20 | Resolved: 2017-11-20

## 2017-11-20 LAB
ANION GAP SERPL CALC-SCNC: 12 MMOL/L
BUN SERPL-MCNC: 10 MG/DL
CALCIUM SERPL-MCNC: 9.7 MG/DL
CHLORIDE SERPL-SCNC: 99 MMOL/L
CO2 SERPL-SCNC: 23 MMOL/L
CREAT SERPL-MCNC: 1 MG/DL
EST. GFR  (AFRICAN AMERICAN): >60 ML/MIN/1.73 M^2
EST. GFR  (NON AFRICAN AMERICAN): >60 ML/MIN/1.73 M^2
GLUCOSE SERPL-MCNC: 93 MG/DL
POTASSIUM SERPL-SCNC: 4.2 MMOL/L
SODIUM SERPL-SCNC: 134 MMOL/L

## 2017-11-20 PROCEDURE — 99999 PR PBB SHADOW E&M-EST. PATIENT-LVL III: CPT | Mod: PBBFAC,,, | Performed by: INTERNAL MEDICINE

## 2017-11-20 PROCEDURE — 80048 BASIC METABOLIC PNL TOTAL CA: CPT

## 2017-11-20 PROCEDURE — 36415 COLL VENOUS BLD VENIPUNCTURE: CPT

## 2017-11-20 PROCEDURE — 99214 OFFICE O/P EST MOD 30 MIN: CPT | Mod: S$GLB,,, | Performed by: INTERNAL MEDICINE

## 2017-11-20 RX ORDER — DULOXETIN HYDROCHLORIDE 30 MG/1
30 CAPSULE, DELAYED RELEASE ORAL DAILY
Qty: 30 CAPSULE | Refills: 11 | Status: SHIPPED | OUTPATIENT
Start: 2017-11-20 | End: 2017-12-18 | Stop reason: SDUPTHER

## 2017-11-20 NOTE — PROGRESS NOTES
HPI:  Erik Mims is a 37 y.o. year old male that  presents with   Chief Complaint   Patient presents with    Abdominal Pain     Discomfort    Annual Exam     Endo F/U   .       Past Medical History:   Diagnosis Date    COPD (chronic obstructive pulmonary disease)     Hypertension     Syncope      Social History     Social History    Marital status: Single     Spouse name: N/A    Number of children: N/A    Years of education: N/A     Occupational History    Not on file.     Social History Main Topics    Smoking status: Current Every Day Smoker     Packs/day: 0.50    Smokeless tobacco: Never Used    Alcohol use Yes    Drug use: No    Sexual activity: Not on file     Other Topics Concern    Not on file     Social History Narrative    No narrative on file     Past Surgical History:   Procedure Laterality Date    LUNG SURGERY       No family history on file.        States that he continues to have issues where he feels weakness.  Patient states that even this morning and issue he feels like he is hungry.  His abdominal pain.  He has weak legs.  Feels that his head is punchdrunk.  Patient's been diagnosed with a polyp in his gallbladder.  Discussed with patient this is not very likely because in his symptoms.  We'll get a BMP this morning due to the fact that he is film as well as morning.  Patient has yet to eat breakfast.  Patient states that his abdominal pain is usually triggered after he eats.  He can last up to a couple hours.  Usually feels the punchdrunk after eating.  Discussed with patient trying Cymbalta for possible anxiety issues.  Patient is willing to try anything.  Patient states that he cannot tolerate his CPAP.  He states he now sits up a little more sleeps and this is resolved as issues.  Patient does say his restless legs are better.  Blood pressures control.  he does report not having completely controlled with the Spiriva.        Health Maintenance Topics with due  "status: Overdue       Topic Date Due    Pneumococcal PPSV23 (Medium Risk) 06/28/1996    Influenza Vaccine 08/01/2017       Review of Systems  Review of Systems   Constitutional: Negative for fever.   Respiratory: Positive for shortness of breath.    Gastrointestinal: Positive for abdominal pain.   Skin: Negative for rash.   Neurological: Positive for dizziness.         Physical Exam:  /70 (BP Location: Right arm, Patient Position: Sitting, BP Method: Medium (Manual))   Pulse 68   Ht 6' 2" (1.88 m)   Wt 89.7 kg (197 lb 10.3 oz)   BMI 25.38 kg/m²   Physical Exam  Vital Signs: Reviewed  General:  No apparent distress  Head:  No signs of head trauma  Eyes:  Pupils are equal.  Extraocular motions intact.  Normal conjunctiva.  Ears:  Hearing grossly intact.  Neck:  Supple.  No carotid bruit.  No thyromegaly.  Chest:  Clear breath sounds bilaterally.  No wheezes bilaterally.  Non-labored respirations.  Symmetrical expansion.  Cardiac:  Normal rate.  Normal rhythmn.  S1 and S2.  Vascular:  No edema.  Peripheral pulses palpable in all extremities.  Abdomen:  Mild BLQ tenderness.  No signs of distention.  No rebound or guarding.  No masses palpated.  Bowel sounds normal.  Genitourinary:  No CVA tenderness.  Musculoskeletal:  Normal gait.  Extremities without clubbing or cyanosis.  Neuro:  Alert & oriented X 3.  Speech normal.   Follows commands.  Psychiatric:  Mood normal.  Skin:  No obvious rash or lesions.    LABS:    Recent Results (from the past 2016 hour(s))   5HIAA, Urine 24 Hour ([facility])    Collection Time: 09/18/17  7:54 AM   Result Value Ref Range    5-HIAA, 24H Ur ([facility]) See result image under hyperlink    Comprehensive metabolic panel    Collection Time: 09/18/17  8:14 AM   Result Value Ref Range    Sodium 134 (L) 136 - 145 mmol/L    Potassium 4.1 3.5 - 5.1 mmol/L    Chloride 98 95 - 110 mmol/L    CO2 26 23 - 29 mmol/L    Glucose 93 70 - 110 mg/dL    BUN, Bld 11 6 - 20 mg/dL    Creatinine 1.0 0.5 - " 1.4 mg/dL    Calcium 9.6 8.7 - 10.5 mg/dL    Total Protein 7.8 6.0 - 8.4 g/dL    Albumin 4.3 3.5 - 5.2 g/dL    Total Bilirubin 0.8 0.1 - 1.0 mg/dL    Alkaline Phosphatase 50 (L) 55 - 135 U/L    AST 21 10 - 40 U/L    ALT 18 10 - 44 U/L    Anion Gap 10 8 - 16 mmol/L    eGFR if African American >60 >60 mL/min/1.73 m^2    eGFR if non African American >60 >60 mL/min/1.73 m^2   CBC auto differential    Collection Time: 09/18/17  8:14 AM   Result Value Ref Range    WBC 5.49 3.90 - 12.70 K/uL    RBC 4.04 (L) 4.60 - 6.20 M/uL    Hemoglobin 13.7 (L) 14.0 - 18.0 g/dL    Hematocrit 40.2 40.0 - 54.0 %     (H) 82 - 98 fL    MCH 33.9 (H) 27.0 - 31.0 pg    MCHC 34.1 32.0 - 36.0 g/dL    RDW 11.9 11.5 - 14.5 %    Platelets 250 150 - 350 K/uL    MPV 8.8 (L) 9.2 - 12.9 fL    Gran # 2.7 1.8 - 7.7 K/uL    Lymph # 1.8 1.0 - 4.8 K/uL    Mono # 0.7 0.3 - 1.0 K/uL    Eos # 0.2 0.0 - 0.5 K/uL    Baso # 0.06 0.00 - 0.20 K/uL    Gran% 49.5 38.0 - 73.0 %    Lymph% 33.3 18.0 - 48.0 %    Mono% 12.8 4.0 - 15.0 %    Eosinophil% 2.9 0.0 - 8.0 %    Basophil% 1.1 0.0 - 1.9 %    Differential Method Automated        Imaging:  Imaging Results    None           Assessment:    ICD-10-CM ICD-9-CM    1. Weakness of both legs R29.898 729.89 Basic metabolic panel   2. Anxiety F41.9 300.00    3. SALMA (obstructive sleep apnea) G47.33 327.23    4. Near syncope R55 780.2 Basic metabolic panel   5. Gallbladder polyp K82.4 575.6    6. Essential hypertension I10 401.9 Basic metabolic panel   7. Chronic obstructive pulmonary disease, unspecified COPD type J44.9 496      The primary encounter diagnosis was Weakness of both legs. Diagnoses of Anxiety, SALMA (obstructive sleep apnea), Near syncope, Gallbladder polyp, Essential hypertension, and Chronic obstructive pulmonary disease, unspecified COPD type were also pertinent to this visit.      Plan:  Orders Placed This Encounter   Procedures    Basic metabolic panel     Added combivnt  [place] of cymbalta  Labs to  check for hypoglycemia      Jaquan Huffman MD

## 2017-12-04 ENCOUNTER — PATIENT MESSAGE (OUTPATIENT)
Dept: INTERNAL MEDICINE | Facility: CLINIC | Age: 37
End: 2017-12-04

## 2017-12-04 RX ORDER — METHYLPREDNISOLONE 4 MG/1
TABLET ORAL
Qty: 1 PACKAGE | Refills: 0 | Status: SHIPPED | OUTPATIENT
Start: 2017-12-04 | End: 2017-12-25

## 2017-12-18 ENCOUNTER — OFFICE VISIT (OUTPATIENT)
Dept: INTERNAL MEDICINE | Facility: CLINIC | Age: 37
End: 2017-12-18
Payer: COMMERCIAL

## 2017-12-18 VITALS
DIASTOLIC BLOOD PRESSURE: 82 MMHG | WEIGHT: 195.19 LBS | SYSTOLIC BLOOD PRESSURE: 112 MMHG | BODY MASS INDEX: 25.05 KG/M2 | HEIGHT: 74 IN | HEART RATE: 93 BPM

## 2017-12-18 DIAGNOSIS — F41.9 ANXIETY: ICD-10-CM

## 2017-12-18 DIAGNOSIS — J44.9 CHRONIC OBSTRUCTIVE PULMONARY DISEASE, UNSPECIFIED COPD TYPE: Primary | ICD-10-CM

## 2017-12-18 PROCEDURE — 99999 PR PBB SHADOW E&M-EST. PATIENT-LVL III: CPT | Mod: PBBFAC,,, | Performed by: INTERNAL MEDICINE

## 2017-12-18 PROCEDURE — 99213 OFFICE O/P EST LOW 20 MIN: CPT | Mod: 25,S$GLB,, | Performed by: INTERNAL MEDICINE

## 2017-12-18 PROCEDURE — 90686 IIV4 VACC NO PRSV 0.5 ML IM: CPT | Mod: S$GLB,,, | Performed by: INTERNAL MEDICINE

## 2017-12-18 PROCEDURE — 90471 IMMUNIZATION ADMIN: CPT | Mod: S$GLB,,, | Performed by: INTERNAL MEDICINE

## 2017-12-18 RX ORDER — DULOXETIN HYDROCHLORIDE 30 MG/1
60 CAPSULE, DELAYED RELEASE ORAL DAILY
Qty: 60 CAPSULE | Refills: 11 | Status: SHIPPED | OUTPATIENT
Start: 2017-12-18 | End: 2018-01-22 | Stop reason: SDUPTHER

## 2017-12-18 NOTE — PROGRESS NOTES
"Patient was given vaccine information sheet for the Flu Vaccine. The area of injection was palpated using the acromion process as a landmark. This area was cleaned with alcohol. Using a 25g 1" safety needle, 0.5mL of the vaccine was placed into the left muscle. The injection site was dressed with a bandage. Patient experienced no complications and was discharged in stable condition. Fluzone vaccine Lot: [id number] Exp: [id number]    "

## 2017-12-18 NOTE — PROGRESS NOTES
"  HPI:  Erik Mims is a 37 y.o. year old male that  presents with   Chief Complaint   Patient presents with    Annual Exam     F/U    Medication Refill     Rx Refill   .       Past Medical History:   Diagnosis Date    COPD (chronic obstructive pulmonary disease)     Hypertension     Syncope      Social History     Social History    Marital status: Single     Spouse name: N/A    Number of children: N/A    Years of education: N/A     Occupational History    Not on file.     Social History Main Topics    Smoking status: Current Every Day Smoker     Packs/day: 0.50    Smokeless tobacco: Never Used    Alcohol use Yes    Drug use: No    Sexual activity: Not on file     Other Topics Concern    Not on file     Social History Narrative    No narrative on file     Past Surgical History:   Procedure Laterality Date    LUNG SURGERY       No family history on file.        Patient is doing better on Cymbalta 30 mg.  He states that he is less anxious however he still an edge.  Patient would like to decrease and 60 mg.  Patient also reports having episodes of hives.  We'll try him on steroids.  Patient blood pressures under control.      Medication Refill   Pertinent negatives include no chest pain.       Health Maintenance Topics with due status: Overdue       Topic Date Due    Pneumococcal PPSV23 (Medium Risk) 06/28/1996    Influenza Vaccine 08/01/2017       Review of Systems  Review of Systems   Respiratory: Negative for shortness of breath.    Cardiovascular: Negative for chest pain.         Physical Exam:  /82   Pulse 93   Ht 6' 2" (1.88 m)   Wt 88.6 kg (195 lb 3.5 oz)   BMI 25.06 kg/m²   Physical Exam  Vital Signs: Reviewed  General:  No apparent distress  Head:  No signs of head trauma  Eyes:  Pupils are equal.  Extraocular motions intact.  Normal conjunctiva.  Ears:  Hearing grossly intact.  Chest:  Non-labored respirations.  Symmetrical expansion.  Musculoskeletal:  Normal gait.  " Extremities without clubbing or cyanosis.  Neuro:  Alert & oriented X 3.  Speech normal.   Follows commands.  Psychiatric:  Mood normal.  Skin:  No obvious rash or lesions.    LABS:    Recent Results (from the past 2016 hour(s))   Basic metabolic panel    Collection Time: 11/20/17  9:10 AM   Result Value Ref Range    Sodium 134 (L) 136 - 145 mmol/L    Potassium 4.2 3.5 - 5.1 mmol/L    Chloride 99 95 - 110 mmol/L    CO2 23 23 - 29 mmol/L    Glucose 93 70 - 110 mg/dL    BUN, Bld 10 6 - 20 mg/dL    Creatinine 1.0 0.5 - 1.4 mg/dL    Calcium 9.7 8.7 - 10.5 mg/dL    Anion Gap 12 8 - 16 mmol/L    eGFR if African American >60 >60 mL/min/1.73 m^2    eGFR if non African American >60 >60 mL/min/1.73 m^2       Imaging:  Imaging Results    None           Assessment:    ICD-10-CM ICD-9-CM    1. Chronic obstructive pulmonary disease, unspecified COPD type J44.9 496    2. Anxiety F41.9 300.00      The primary encounter diagnosis was Chronic obstructive pulmonary disease, unspecified COPD type. A diagnosis of Anxiety was also pertinent to this visit.      Plan:  Orders Placed This Encounter   Procedures    Pneumococcal Polysaccharide Vaccine (23 Valent) (SQ/IM)           Jaquan Huffman MD

## 2018-01-22 RX ORDER — DULOXETIN HYDROCHLORIDE 30 MG/1
60 CAPSULE, DELAYED RELEASE ORAL DAILY
Qty: 60 CAPSULE | Refills: 11 | Status: SHIPPED | OUTPATIENT
Start: 2018-01-22 | End: 2018-02-20 | Stop reason: SDUPTHER

## 2018-01-29 ENCOUNTER — OFFICE VISIT (OUTPATIENT)
Dept: INTERNAL MEDICINE | Facility: CLINIC | Age: 38
End: 2018-01-29
Payer: COMMERCIAL

## 2018-01-29 ENCOUNTER — PATIENT MESSAGE (OUTPATIENT)
Dept: INTERNAL MEDICINE | Facility: CLINIC | Age: 38
End: 2018-01-29

## 2018-01-29 VITALS
WEIGHT: 195.44 LBS | BODY MASS INDEX: 25.08 KG/M2 | DIASTOLIC BLOOD PRESSURE: 80 MMHG | SYSTOLIC BLOOD PRESSURE: 120 MMHG | HEIGHT: 74 IN | TEMPERATURE: 98 F

## 2018-01-29 DIAGNOSIS — F41.9 ANXIETY: ICD-10-CM

## 2018-01-29 DIAGNOSIS — I10 ESSENTIAL HYPERTENSION: Primary | ICD-10-CM

## 2018-01-29 PROCEDURE — 99999 PR PBB SHADOW E&M-EST. PATIENT-LVL III: CPT | Mod: PBBFAC,,, | Performed by: INTERNAL MEDICINE

## 2018-01-29 PROCEDURE — 99212 OFFICE O/P EST SF 10 MIN: CPT | Mod: S$GLB,,, | Performed by: INTERNAL MEDICINE

## 2018-01-29 RX ORDER — LISINOPRIL AND HYDROCHLOROTHIAZIDE 10; 12.5 MG/1; MG/1
TABLET ORAL
Refills: 3 | COMMUNITY
Start: 2018-01-20 | End: 2018-02-20 | Stop reason: ALTCHOICE

## 2018-01-29 NOTE — PROGRESS NOTES
HPI:  Erik Mims is a 37 y.o. year old male that  presents with   Chief Complaint   Patient presents with    Annual Exam     F/U   .       Past Medical History:   Diagnosis Date    COPD (chronic obstructive pulmonary disease)     Hypertension     Syncope      Social History     Social History    Marital status: Single     Spouse name: N/A    Number of children: N/A    Years of education: N/A     Occupational History    Not on file.     Social History Main Topics    Smoking status: Current Every Day Smoker     Packs/day: 0.50    Smokeless tobacco: Never Used    Alcohol use Yes    Drug use: No    Sexual activity: Not on file     Other Topics Concern    Not on file     Social History Narrative    No narrative on file     Past Surgical History:   Procedure Laterality Date    LUNG SURGERY       No family history on file.        For follow-up of his hypertension as well as anxiety.  Hypertension is well controlled.  His anxiety has improved on the Cymbalta 60 mg.  Patient reports having had 3 episodes in the past month.  His episodes very much related to chest pain and shortness breath.  These episodes lasted roughly an hour or 2.  He states that they resolve when he gets away from everything calms down.  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  Discussed with the patient that he may continue to improve on this medication as it is only been in his system for roughly 4 weeks.        Health Maintenance Topics with due status: Overdue       Topic Date Due    Pneumococcal PPSV23 (Medium Risk) 06/28/1996       Review of Systems  Review of Systems   Constitutional: Negative for fever.   Respiratory: Positive for shortness of breath.    Cardiovascular: Negative for chest pain.   Gastrointestinal: Negative for abdominal pain.         Physical Exam:  /80 (BP Location:  "Right arm, Patient Position: Sitting, BP Method: Medium (Manual))   Temp 98.3 °F (36.8 °C)   Ht 6' 2" (1.88 m)   Wt 88.6 kg (195 lb 7 oz)   BMI 25.09 kg/m²   Physical Exam  Vital Signs: Reviewed  General:  No apparent distress  Head:  No signs of head trauma  Eyes:  Pupils are equal.  Extraocular motions intact.  Normal conjunctiva.  Ears:  Hearing grossly intact.  Chest:  Non-labored respirations.  Symmetrical expansion.  Musculoskeletal:  Normal gait.  Extremities without clubbing or cyanosis.  Neuro:  Alert & oriented X 3.  Speech normal.   Follows commands.  Psychiatric:  Mood normal.  Skin:  No obvious rash or lesions.    LABS:    Recent Results (from the past 2016 hour(s))   Basic metabolic panel    Collection Time: 11/20/17  9:10 AM   Result Value Ref Range    Sodium 134 (L) 136 - 145 mmol/L    Potassium 4.2 3.5 - 5.1 mmol/L    Chloride 99 95 - 110 mmol/L    CO2 23 23 - 29 mmol/L    Glucose 93 70 - 110 mg/dL    BUN, Bld 10 6 - 20 mg/dL    Creatinine 1.0 0.5 - 1.4 mg/dL    Calcium 9.7 8.7 - 10.5 mg/dL    Anion Gap 12 8 - 16 mmol/L    eGFR if African American >60 >60 mL/min/1.73 m^2    eGFR if non African American >60 >60 mL/min/1.73 m^2       Imaging:  Imaging Results    None           Assessment:    ICD-10-CM ICD-9-CM    1. Essential hypertension I10 401.9    2. Anxiety F41.9 300.00      The primary encounter diagnosis was Essential hypertension. A diagnosis of Anxiety was also pertinent to this visit.      Plan:  No orders of the defined types were placed in this encounter.          Jaquan Huffman MD  "

## 2018-02-18 ENCOUNTER — PATIENT MESSAGE (OUTPATIENT)
Dept: INTERNAL MEDICINE | Facility: CLINIC | Age: 38
End: 2018-02-18

## 2018-02-20 DIAGNOSIS — R10.9 ABDOMINAL PAIN, UNSPECIFIED ABDOMINAL LOCATION: ICD-10-CM

## 2018-02-20 RX ORDER — ROPINIROLE 0.25 MG/1
0.25 TABLET, FILM COATED ORAL 3 TIMES DAILY
Qty: 90 TABLET | Refills: 11 | Status: SHIPPED | OUTPATIENT
Start: 2018-02-20 | End: 2018-02-20 | Stop reason: SDUPTHER

## 2018-02-20 RX ORDER — DULOXETIN HYDROCHLORIDE 30 MG/1
60 CAPSULE, DELAYED RELEASE ORAL DAILY
Qty: 60 CAPSULE | Refills: 11 | Status: SHIPPED | OUTPATIENT
Start: 2018-02-20 | End: 2018-02-20 | Stop reason: SDUPTHER

## 2018-02-20 RX ORDER — LISINOPRIL AND HYDROCHLOROTHIAZIDE 12.5; 2 MG/1; MG/1
1 TABLET ORAL DAILY
Qty: 90 TABLET | Refills: 3 | Status: SHIPPED | OUTPATIENT
Start: 2018-02-20 | End: 2018-02-20 | Stop reason: SDUPTHER

## 2018-02-20 RX ORDER — PANTOPRAZOLE SODIUM 40 MG/1
40 TABLET, DELAYED RELEASE ORAL DAILY
Qty: 30 TABLET | Refills: 11 | Status: SHIPPED | OUTPATIENT
Start: 2018-02-20 | End: 2018-02-20 | Stop reason: SDUPTHER

## 2018-02-21 ENCOUNTER — OFFICE VISIT (OUTPATIENT)
Dept: URGENT CARE | Facility: CLINIC | Age: 38
End: 2018-02-21
Payer: COMMERCIAL

## 2018-02-21 VITALS
RESPIRATION RATE: 16 BRPM | TEMPERATURE: 100 F | OXYGEN SATURATION: 95 % | HEIGHT: 74 IN | DIASTOLIC BLOOD PRESSURE: 98 MMHG | SYSTOLIC BLOOD PRESSURE: 128 MMHG | WEIGHT: 195 LBS | HEART RATE: 135 BPM | BODY MASS INDEX: 25.03 KG/M2

## 2018-02-21 DIAGNOSIS — S20.211A CONTUSION OF RIGHT CHEST WALL, INITIAL ENCOUNTER: ICD-10-CM
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
## 2018-02-21 DIAGNOSIS — J40 BRONCHITIS: ICD-10-CM

## 2018-02-21 PROCEDURE — 96372 THER/PROPH/DIAG INJ SC/IM: CPT | Mod: S$GLB,,, | Performed by: FAMILY MEDICINE

## 2018-02-21 PROCEDURE — 99214 OFFICE O/P EST MOD 30 MIN: CPT | Mod: 25,S$GLB,, | Performed by: FAMILY MEDICINE

## 2018-02-21 PROCEDURE — 3008F BODY MASS INDEX DOCD: CPT | Mod: S$GLB,,, | Performed by: FAMILY MEDICINE

## 2018-02-21 RX ORDER — PANTOPRAZOLE SODIUM 40 MG/1
40 TABLET, DELAYED RELEASE ORAL DAILY
Qty: 30 TABLET | Refills: 11 | Status: SHIPPED | OUTPATIENT
Start: 2018-02-21 | End: 2019-05-07 | Stop reason: SDUPTHER

## 2018-02-21 RX ORDER — LISINOPRIL AND HYDROCHLOROTHIAZIDE 12.5; 2 MG/1; MG/1
1 TABLET ORAL DAILY
Qty: 90 TABLET | Refills: 3 | Status: SHIPPED | OUTPATIENT
Start: 2018-02-21 | End: 2018-08-15 | Stop reason: SDUPTHER

## 2018-02-21 RX ORDER — BENZONATATE 100 MG/1
200 CAPSULE ORAL 3 TIMES DAILY PRN
Qty: 20 CAPSULE | Refills: 0 | Status: SHIPPED | OUTPATIENT
Start: 2018-02-21 | End: 2018-08-15

## 2018-02-21 RX ORDER — AZITHROMYCIN 250 MG/1
TABLET, FILM COATED ORAL
Qty: 6 TABLET | Refills: 0 | Status: SHIPPED | OUTPATIENT
Start: 2018-02-21 | End: 2018-08-15

## 2018-02-21 RX ORDER — BETAMETHASONE SODIUM PHOSPHATE AND BETAMETHASONE ACETATE 3; 3 MG/ML; MG/ML
6 INJECTION, SUSPENSION INTRA-ARTICULAR; INTRALESIONAL; INTRAMUSCULAR; SOFT TISSUE
Status: COMPLETED | OUTPATIENT
Start: 2018-02-21 | End: 2018-02-21

## 2018-02-21 RX ORDER — ROPINIROLE 0.25 MG/1
0.25 TABLET, FILM COATED ORAL 3 TIMES DAILY
Qty: 90 TABLET | Refills: 11 | Status: SHIPPED | OUTPATIENT
Start: 2018-02-21 | End: 2018-08-15 | Stop reason: SDUPTHER

## 2018-02-21 RX ORDER — DULOXETIN HYDROCHLORIDE 30 MG/1
60 CAPSULE, DELAYED RELEASE ORAL DAILY
Qty: 60 CAPSULE | Refills: 11 | Status: SHIPPED | OUTPATIENT
Start: 2018-02-21 | End: 2018-08-15 | Stop reason: SDUPTHER

## 2018-02-21 RX ORDER — PROMETHAZINE HYDROCHLORIDE AND CODEINE PHOSPHATE 6.25; 1 MG/5ML; MG/5ML
5 SOLUTION ORAL EVERY 6 HOURS PRN
Qty: 118 ML | Refills: 0 | Status: SHIPPED | OUTPATIENT
Start: 2018-02-21 | End: 2018-07-25

## 2018-02-21 RX ADMIN — BETAMETHASONE SODIUM PHOSPHATE AND BETAMETHASONE ACETATE 6 MG: 3; 3 INJECTION, SUSPENSION INTRA-ARTICULAR; INTRALESIONAL; INTRAMUSCULAR; SOFT TISSUE at 01:02

## 2018-02-21 NOTE — PROGRESS NOTES
"Subjective:       Patient ID: Erik Mims is a 37 y.o. male.    Vitals:  height is 6' 2" (1.88 m) and weight is 88.5 kg (195 lb). His tympanic temperature is 99.8 °F (37.7 °C). His blood pressure is 141/100 (abnormal) and his pulse is 135 (abnormal). His respiration is 16 and oxygen saturation is 95%.     Chief Complaint: Chest Injury (RT SIDED RIB PAIN AFTER FALL) and Cough    Patient has a remote history of pneumothorax right side that resulted in an upper lung surgery to correct.       Cough   This is a new problem. The current episode started 1 to 4 weeks ago. The problem has been unchanged. The problem occurs hourly. The cough is productive of sputum. Pertinent negatives include no chest pain, chills, ear pain, eye redness, fever, headaches, myalgias, sore throat, shortness of breath or wheezing. The symptoms are aggravated by lying down. He has tried nothing (MUCINEX) for the symptoms. The treatment provided mild relief. His past medical history is significant for bronchitis and COPD.     Review of Systems   Constitution: Negative for chills, fever, weakness and malaise/fatigue.   HENT: Negative for congestion, ear pain, hoarse voice, nosebleeds and sore throat.    Eyes: Negative for discharge and redness.   Cardiovascular: Negative for chest pain, dyspnea on exertion, leg swelling and syncope.   Respiratory: Positive for cough and sputum production. Negative for shortness of breath and wheezing.    Musculoskeletal: Negative for back pain, joint pain, myalgias and neck pain.        RT RIB PAIN AFTER FALL 2.5 WEEKS PRIOR   Gastrointestinal: Negative for abdominal pain and nausea.   Genitourinary: Negative for hematuria.   Neurological: Negative for dizziness, headaches and numbness.       Objective:      Physical Exam   Constitutional: He is oriented to person, place, and time. He appears well-developed and well-nourished. He is cooperative.  Non-toxic appearance. He does not appear ill. No distress. "   HENT:   Head: Normocephalic and atraumatic.   Right Ear: Hearing, tympanic membrane, external ear and ear canal normal.   Left Ear: Hearing, tympanic membrane, external ear and ear canal normal.   Nose: Nose normal. No mucosal edema, rhinorrhea or nasal deformity. No epistaxis. Right sinus exhibits no maxillary sinus tenderness and no frontal sinus tenderness. Left sinus exhibits no maxillary sinus tenderness and no frontal sinus tenderness.   Mouth/Throat: Uvula is midline, oropharynx is clear and moist and mucous membranes are normal. No trismus in the jaw. Normal dentition. No uvula swelling. No posterior oropharyngeal erythema.   Eyes: Conjunctivae and lids are normal. No scleral icterus.   Sclera clear bilat   Neck: Trachea normal, full passive range of motion without pain and phonation normal. Neck supple.   Cardiovascular: Normal rate, regular rhythm, normal heart sounds, intact distal pulses and normal pulses.    Pulmonary/Chest: Effort normal. No respiratory distress. He has no decreased breath sounds. He has no wheezes. He has rhonchi in the left lower field. He has no rales. He exhibits tenderness, bony tenderness and crepitus. He exhibits no edema, no deformity, no swelling and no retraction.       Abdominal: Soft. Normal appearance and bowel sounds are normal. He exhibits no distension. There is no tenderness.   Musculoskeletal: Normal range of motion. He exhibits no edema or deformity.   Neurological: He is alert and oriented to person, place, and time. He exhibits normal muscle tone. Coordination normal.   Skin: Skin is warm, dry and intact. He is not diaphoretic. No pallor.   Psychiatric: He has a normal mood and affect. His speech is normal and behavior is normal. Judgment and thought content normal. Cognition and memory are normal.   Nursing note and vitals reviewed.      Assessment:       1. Fall, initial encounter    2. Bronchitis    3. Contusion of right chest wall, initial encounter         Plan:     Patient has a history of right lung surgery.  There is a thin line right upper lung field noted.  No acute consolidation or infiltration.  Elevated right hemidiaphragm.  Preliminary reading   X-ray Chest Pa And Lateral    Result Date: 2/21/2018  PA and lateral views of the chest Comparison: None Results: the lungs are clear. Heart size is within normal limits. There is no pleural effusion or pneumothorax.The osseous structures grossly intact.      See above Electronically signed by: LÓPEZ GONZALEZ DO Date:     02/21/18 Time:    12:53     X-ray Ribs 2 View Right    Result Date: 2/21/2018  Technique: AP and oblique views of the right ribs Comparison: Concomitant chest x-ray Results:There is no evidence for acute right rib fracture. No consolidation the right lung.     See Above . Electronically signed by: LÓPEZ GONZALEZ DO Date:     02/21/18 Time:    12:54       Fall, initial encounter  -     X-Ray Ribs 2 View Right; Future; Expected date: 02/21/2018    Bronchitis  -     betamethasone acetate-betamethasone sodium phosphate injection 6 mg; Inject 1 mL (6 mg total) into the muscle one time.  -     azithromycin (ZITHROMAX Z-WILLIE) 250 MG tablet; Take 2 tablets (500 mg) on  Day 1,  followed by 1 tablet (250 mg) once daily on Days 2 through 5.  Dispense: 6 tablet; Refill: 0  -     benzonatate (TESSALON PERLES) 100 MG capsule; Take 2 capsules (200 mg total) by mouth 3 (three) times daily as needed for Cough.  Dispense: 20 capsule; Refill: 0  -     promethazine-codeine 6.25-10 mg/5 ml (PHENERGAN WITH CODEINE) 6.25-10 mg/5 mL syrup; Take 5 mLs by mouth every 6 (six) hours as needed.  Dispense: 118 mL; Refill: 0    Contusion of right chest wall, initial encounter      Follow Up Comments   Make sure that you follow up with your primary care doctor in the next 2-5 days if needed .  Return to the Urgent Care if signs or symptoms change and certainly if you have worsening symptoms go to the nearest emergency department for  further evaluation.

## 2018-02-21 NOTE — PATIENT INSTRUCTIONS
What Is Acute Bronchitis?  Acute bronchitis is when the airways in your lungs (bronchial tubes) become red and swollen (inflamed). It is usually caused by a viral infection. But it can also occur because of a bacteria or allergen. Symptoms include a cough that produces yellow or greenish mucus and can last for days or sometimes weeks.  Inside healthy lungs    Air travels in and out of the lungs through the airways. The linings of these airways produce sticky mucus. This mucus traps particles that enter the lungs. Tiny structures called cilia then sweep the particles out of the airways.     Healthy airway: Airways are normally open. Air moves in and out easily.      Healthy cilia: Tiny, hairlike cilia sweep mucus and particles up and out of the airways.   Lungs with bronchitis  Bronchitis often occurs with a cold or the flu virus. The airways become inflamed (red and swollen). There is a deep hacking cough from the extra mucus. Other symptoms may include:  · Wheezing  · Chest discomfort  · Shortness of breath  · Mild fever  A second infection, this time due to bacteria, may then occur. And airways irritated by allergens or smoke are more likely to get infected.        Inflamed airway: Inflammation and extra mucus narrow the airway, causing shortness of breath.      Impaired cilia: Extra mucus impairs cilia, causing congestion and wheezing. Smoking makes the problem worse.   Making a diagnosis  A physical exam, health history, and certain tests help your healthcare provider make the diagnosis.  Health history  Your healthcare provider will ask you about your symptoms.  The exam  Your provider listens to your chest for signs of congestion. He or she may also check your ears, nose, and throat.  Possible tests  · A sputum test for bacteria. This requires a sample of mucus from your lungs.  · A nasal or throat swab. This tests to see if you have a bacterial infection.  · A chest X-ray. This is done if your healthcare  provider thinks you have pneumonia.  · Tests to check for an underlying condition. Other tests may be done to check for things such as allergies, asthma, or COPD (chronic obstructive pulmonary disease). You may need to see a specialist for more lung function testing.  Treating a cough  The main treatment for bronchitis is easing symptoms. Avoiding smoke, allergens, and other things that trigger coughing can often help. If the infection is bacterial, you may be given antibiotics. During the illness, it's important to get plenty of sleep. To ease symptoms:  · Dont smoke. Also avoid secondhand smoke.  · Use a humidifier. Or try breathing in steam from a hot shower. This may help loosen mucus.  · Drink a lot of water and juice. They can soothe the throat and may help thin mucus.  · Sit up or use extra pillows when in bed. This helps to lessen coughing and congestion.  · Ask your provider about using medicine. Ask about using cough medicine, pain and fever medicine, or a decongestant.  Antibiotics  Most cases of bronchitis are caused by cold or flu viruses. They dont need antibiotics to treat them, even if your mucus is thick and green or yellow. Antibiotics dont treat viral illness and antibiotics have not been shown to have any benefit in cases of acute bronchitis. Taking antibiotics when they are not needed increases your risk of getting an infection later that is antibiotic-resistant. Antibiotics can also cause severe cases of diarrhea that require other antibiotics to treat.  It is important that you accept your healthcare provider's opinion to not use antibiotics. Your provider will prescribe antibiotics if the infection is caused by bacteria. If they are prescribed:  · Take all of the medicine. Take the medicine until it is used up, even if symptoms have improved. If you dont, the bronchitis may come back.  · Take the medicines as directed. For instance, some medicines should be taken with food.  · Ask about  side effects. Ask your provider or pharmacist what side effects are common, and what to do about them.  Follow-up care  You should see your provider again in 2 to 3 weeks. By this time, symptoms should have improved. An infection that lasts longer may mean you have a more serious problem.  Prevention  · Avoid tobacco smoke. If you smoke, quit. Stay away from smoky places. Ask friends and family not to smoke around you, or in your home or car.  · Get checked for allergies.  · Ask your provider about getting a yearly flu shot. Also ask about pneumococcal or pneumonia shots.  · Wash your hands often. This helps reduce the chance of picking up viruses that cause colds and flu.  Call your healthcare provider if:  · Symptoms worsen, or you have new symptoms  · Breathing problems worsen or  become severe  · Symptoms dont get better within a week, or within 3 days of taking antibiotics   Date Last Reviewed: 2/1/2017  © 5798-9621 [employer]. [address] [place] [address]. All rights reserved. This information is not intended as a substitute for professional medical care. Always follow your healthcare professional's instructions.        Chest Contusion    A contusion is a bruise to the skin, muscle, or ribs. It may cause pain, tenderness, and swelling. It may turn the skin purple until it heals. Contusions take a few days to a few weeks to heal.  Home care  Follow these guidelines when caring for yourself at home:  · Rest. Dont do any heavy lifting or strenuous activity. Dont do any activity that causes pain.  · Put an ice pack on the injured area. Do this for 20 minutes every 1 to 2 hours the first day. You can make an ice pack by wrapping a plastic bag of ice cubes in a thin towel. Continue to use the ice pack 3 to 4 times a day for the next 2 days. Then use the ice pack as needed to ease pain and swelling.  · After 1 to 2 days you may put a warm compress on the area. Do this for 10 minutes  several times a day. A warm compress is a clean cloth thats damp with warm water.  · Hold a pillow to the affected area when you cough. This will help ease pain.  · You may use acetaminophen or ibuprofen to control pain, unless another pain medicine was prescribed. If you have chronic liver or kidney disease, talk with your health care provider before using these medicines. Also talk with your provider if youve had a stomach ulcer or GI bleeding.  Follow-up care  Follow up with your health care provider during the next week, or as advised.  When to seek medical advice  Call your health care provider right away if any of these occur:  · Shortness of breath, difficulty breathing, or breathing fast  · Chest pain gets worse when you breathe  · Severe pain that comes on suddenly or lasts more than an hour  · Dizziness, weakness, or fainting  · New abdominal pain or abdominal pain that gets worse  ·  Fever of 101ºF (38.3ºC) or higher, or as directed by your health care provider  Date Last Reviewed: 2/15/2015  © 6025-3195 [employer]. [address] [place] [address]. All rights reserved. This information is not intended as a substitute for professional medical care. Always follow your healthcare professional's instructions.    Erik was seen today for chest injury and cough.    Diagnoses and all orders for this visit:    Fall, initial encounter  -     X-Ray Ribs 2 View Right; Future    Bronchitis  -     betamethasone acetate-betamethasone sodium phosphate injection 6 mg; Inject 1 mL (6 mg total) into the muscle one time.  -     azithromycin (ZITHROMAX Z-WILLIE) 250 MG tablet; Take 2 tablets (500 mg) on  Day 1,  followed by 1 tablet (250 mg) once daily on Days 2 through 5.  -     benzonatate (TESSALON PERLES) 100 MG capsule; Take 2 capsules (200 mg total) by mouth 3 (three) times daily as needed for Cough.  -     promethazine-codeine 6.25-10 mg/5 ml (PHENERGAN WITH CODEINE) 6.25-10 mg/5 mL syrup;  Take 5 mLs by mouth every 6 (six) hours as needed.    Contusion of right chest wall, initial encounter            Follow Up Comments   Make sure that you follow up with your primary care doctor in the next 2-5 days if needed .  Return to the Urgent Care if signs or symptoms change and certainly if you have worsening symptoms go to the nearest emergency department for further evaluation.     Unique Davidson MD

## 2018-07-16 ENCOUNTER — PATIENT MESSAGE (OUTPATIENT)
Dept: SURGERY | Facility: CLINIC | Age: 38
End: 2018-07-16

## 2018-07-25 ENCOUNTER — OFFICE VISIT (OUTPATIENT)
Dept: SURGERY | Facility: CLINIC | Age: 38
End: 2018-07-25
Payer: COMMERCIAL

## 2018-07-25 VITALS
WEIGHT: 182.13 LBS | HEART RATE: 95 BPM | TEMPERATURE: 98 F | SYSTOLIC BLOOD PRESSURE: 135 MMHG | HEIGHT: 74 IN | BODY MASS INDEX: 23.37 KG/M2 | DIASTOLIC BLOOD PRESSURE: 91 MMHG

## 2018-07-25 DIAGNOSIS — S70.02XA CONTUSION OF LEFT HIP, INITIAL ENCOUNTER: ICD-10-CM

## 2018-07-25 DIAGNOSIS — I10 ESSENTIAL HYPERTENSION: Primary | ICD-10-CM

## 2018-07-25 PROCEDURE — 99214 OFFICE O/P EST MOD 30 MIN: CPT | Mod: S$GLB,,, | Performed by: STUDENT IN AN ORGANIZED HEALTH CARE EDUCATION/TRAINING PROGRAM

## 2018-07-25 PROCEDURE — 99999 PR PBB SHADOW E&M-EST. PATIENT-LVL III: CPT | Mod: PBBFAC,,, | Performed by: STUDENT IN AN ORGANIZED HEALTH CARE EDUCATION/TRAINING PROGRAM

## 2018-07-25 NOTE — PROGRESS NOTES
"Patient ID: Erik Mims is a 38 y.o. male.    Chief Complaint: No chief complaint on file.      HPI:  38M seen by me last year for weakness, "fainting" spells. Better from that standpoint. Feels like it mostly resolved after starting cymbalta. Here today because he fell on July 14th from standing onto a metal ramp. Landed on his left hip area. Concerned about degree of bruising. Able to ambulate well. No pain with weight bearing. Full ROM he feels. Denies LOC, HA. Also with right patella pain. He was at work and quickly kneeled down on the edge of a boat with his right knee. Somewhat painful still but improving. This was about a week ago. Overall concerned that his degree of bruising in excessive. Denies any hx of bleeding problems. No bleeding gums, no nose bleeds. Never had bloody stool.         Review of Systems   Constitutional: Negative for chills, diaphoresis and fever.   HENT: Negative for trouble swallowing.    Respiratory: Negative for cough, shortness of breath, wheezing and stridor.    Cardiovascular: Negative for chest pain and palpitations.   Gastrointestinal: Negative for abdominal distention, abdominal pain, blood in stool, diarrhea, nausea and vomiting.   Endocrine: Negative for cold intolerance and heat intolerance.   Genitourinary: Negative for difficulty urinating.   Musculoskeletal: Negative for back pain.   Skin: Negative for rash.   Allergic/Immunologic: Negative for immunocompromised state.   Neurological: Negative for dizziness, syncope and numbness.   Hematological: Negative for adenopathy.   Psychiatric/Behavioral: Negative for agitation.       Current Outpatient Prescriptions   Medication Sig Dispense Refill    aspirin (ECOTRIN) 81 MG EC tablet Take 1 tablet (81 mg total) by mouth once daily. 30 tablet 12    azithromycin (ZITHROMAX Z-WILLIE) 250 MG tablet Take 2 tablets (500 mg) on  Day 1,  followed by 1 tablet (250 mg) once daily on Days 2 through 5. 6 tablet 0    benzonatate " (TESSALON PERLES) 100 MG capsule Take 2 capsules (200 mg total) by mouth 3 (three) times daily as needed for Cough. 20 capsule 0    DULoxetine (CYMBALTA) 30 MG capsule Take 2 capsules (60 mg total) by mouth once daily. 60 capsule 11    ipratropium-albuterol (COMBIVENT)  mcg/actuation inhaler Inhale 1 puff into the lungs every 4 (four) hours as needed for Wheezing. Rescue 4 g 11    lisinopril-hydrochlorothiazide (PRINZIDE,ZESTORETIC) 20-12.5 mg per tablet Take 1 tablet by mouth once daily. 90 tablet 3    pantoprazole (PROTONIX) 40 MG tablet Take 1 tablet (40 mg total) by mouth once daily. 30 tablet 11    rOPINIRole (REQUIP) 0.25 MG tablet Take 1 tablet (0.25 mg total) by mouth 3 (three) times daily. 90 tablet 11    tiotropium (SPIRIVA) 18 mcg inhalation capsule Inhale 1 capsule (18 mcg total) into the lungs once daily. Controller 90 capsule 0    nitroGLYCERIN (NITROSTAT) 0.4 MG SL tablet Place 1 tablet (0.4 mg total) under the tongue every 5 (five) minutes as needed for Chest pain. 20 tablet 12     No current facility-administered medications for this visit.        Review of patient's allergies indicates:  No Known Allergies    Past Medical History:   Diagnosis Date    COPD (chronic obstructive pulmonary disease)     Hypertension     Syncope        Past Surgical History:   Procedure Laterality Date    LUNG SURGERY         No family history on file.    Social History     Social History    Marital status: Single     Spouse name: N/A    Number of children: N/A    Years of education: N/A     Occupational History    Not on file.     Social History Main Topics    Smoking status: Current Every Day Smoker     Packs/day: 0.50    Smokeless tobacco: Never Used    Alcohol use Yes    Drug use: No    Sexual activity: Not on file     Other Topics Concern    Not on file     Social History Narrative    No narrative on file       Vitals:    07/25/18 1552   BP: (!) 135/91   Pulse: 95   Temp: 98 °F (36.7 °C)        Physical Exam   Constitutional: He is oriented to person, place, and time. He appears well-nourished. No distress.   HENT:   Head: Normocephalic and atraumatic.   Eyes: No scleral icterus.   Cardiovascular: Normal rate.    Pulmonary/Chest: Effort normal. No stridor. No respiratory distress.   Abdominal: Soft. There is no tenderness.   Musculoskeletal:   Left lateral hip ecchymosis   Lymphadenopathy:     He has no cervical adenopathy.   Neurological: He is alert and oriented to person, place, and time.   Skin: Skin is warm. No erythema.   Psychiatric: He has a normal mood and affect. His behavior is normal.   Skin intact, no signs of infection  No bony TTP  Small laceration anterior right patella. Skin healing, intact    EGD unremarkable    Assessment & Plan:   38M with left hip pain, right knee pain  Ecchymosis does not seem that excessive for his story. Should heal up and resolve without any difficulty. Instructed him to give the office a call if anything worsens or does not improve over the next week or two. Dont see where xrays would be helpful at this time.   Refer to internal medicine for PCP

## 2018-07-25 NOTE — LETTER
July 25, 2018    Erik Mims  [address]  [place] [address]         [facility]  [address] [place] [address]  4th Floor [facility]  [place] [address]  Phone: [phone] July 25, 2018     Patient: Erik Mims   YOB: 1980   Date of Visit: 7/25/2018       To Whom It May Concern:    Erik Mims may not climb stairs for 1 week. He may resume normal activities after 8/1/18. Thank you for accommodating Mr. Mims at this time.  If you have any questions or concerns, please don't hesitate to call.    Sincerely,          MD Boogie Wilkes LPN

## 2018-08-15 ENCOUNTER — OFFICE VISIT (OUTPATIENT)
Dept: INTERNAL MEDICINE | Facility: CLINIC | Age: 38
End: 2018-08-15
Payer: COMMERCIAL

## 2018-08-15 VITALS
OXYGEN SATURATION: 95 % | HEART RATE: 98 BPM | BODY MASS INDEX: 23.54 KG/M2 | WEIGHT: 183.44 LBS | DIASTOLIC BLOOD PRESSURE: 72 MMHG | SYSTOLIC BLOOD PRESSURE: 124 MMHG | HEIGHT: 74 IN

## 2018-08-15 DIAGNOSIS — Z00.00 ANNUAL PHYSICAL EXAM: Primary | ICD-10-CM

## 2018-08-15 DIAGNOSIS — F41.9 ANXIETY: ICD-10-CM

## 2018-08-15 DIAGNOSIS — J44.9 CHRONIC OBSTRUCTIVE PULMONARY DISEASE, UNSPECIFIED COPD TYPE: ICD-10-CM

## 2018-08-15 DIAGNOSIS — G25.81 RLS (RESTLESS LEGS SYNDROME): ICD-10-CM

## 2018-08-15 DIAGNOSIS — F17.200 SEVERE TOBACCO DEPENDENCE: ICD-10-CM

## 2018-08-15 DIAGNOSIS — I10 ESSENTIAL HYPERTENSION: ICD-10-CM

## 2018-08-15 PROBLEM — S70.02XA CONTUSION OF LEFT HIP: Status: RESOLVED | Noted: 2018-07-25 | Resolved: 2018-08-15

## 2018-08-15 PROBLEM — R07.89 CHEST TIGHTNESS OR PRESSURE: Status: RESOLVED | Noted: 2017-07-20 | Resolved: 2018-08-15

## 2018-08-15 PROBLEM — R10.13 EPIGASTRIC PAIN: Status: RESOLVED | Noted: 2017-09-11 | Resolved: 2018-08-15

## 2018-08-15 PROBLEM — Z72.0 TOBACCO USE: Status: RESOLVED | Noted: 2017-07-20 | Resolved: 2018-08-15

## 2018-08-15 PROBLEM — R55 NEAR SYNCOPE: Status: RESOLVED | Noted: 2017-07-20 | Resolved: 2018-08-15

## 2018-08-15 PROCEDURE — 99999 PR PBB SHADOW E&M-EST. PATIENT-LVL III: CPT | Mod: PBBFAC,,, | Performed by: INTERNAL MEDICINE

## 2018-08-15 PROCEDURE — 99396 PREV VISIT EST AGE 40-64: CPT | Mod: S$GLB,,, | Performed by: INTERNAL MEDICINE

## 2018-08-15 RX ORDER — ROPINIROLE 0.25 MG/1
0.25 TABLET, FILM COATED ORAL NIGHTLY PRN
Qty: 90 TABLET | Refills: 0 | Status: SHIPPED | OUTPATIENT
Start: 2018-08-15 | End: 2019-05-07 | Stop reason: SDUPTHER

## 2018-08-15 RX ORDER — LISINOPRIL AND HYDROCHLOROTHIAZIDE 12.5; 2 MG/1; MG/1
1 TABLET ORAL DAILY
Qty: 90 TABLET | Refills: 3 | Status: SHIPPED | OUTPATIENT
Start: 2018-08-15 | End: 2019-05-07 | Stop reason: SDUPTHER

## 2018-08-15 RX ORDER — DULOXETIN HYDROCHLORIDE 60 MG/1
60 CAPSULE, DELAYED RELEASE ORAL DAILY
Qty: 90 CAPSULE | Refills: 1 | Status: SHIPPED | OUTPATIENT
Start: 2018-08-15 | End: 2019-05-07 | Stop reason: SDUPTHER

## 2018-08-15 NOTE — LETTER
August 19, 2018      Dylan Licona MD  [address]  [address]  [facility] [address]           [facility]  [address]  [place] [address]  Phone: [phone]  Fax: [phone]          Patient: Erik Mims   MR Number: [id number]   YOB: 1980   Date of Visit: 8/15/2018       Dear Dr. Dylan Licona:    Thank you for referring Erik Mims to me for evaluation. Attached you will find relevant portions of my assessment and plan of care.    If you have questions, please do not hesitate to call me. I look forward to following Erik Mims along with you.    Sincerely,    Benny Dang MD    Enclosure  CC:  No Recipients    If you would like to receive this communication electronically, please contact [email or web address] or [phone] to request more information on [employer] access.    For providers and/or their staff who would like to refer a patient to [facility], please contact us through our one-stop-shop provider referral line, [facility] [place], at [phone].    If you feel you have received this communication in error or would no longer like to receive these types of communications, please e-mail [email or web address]

## 2018-08-16 NOTE — PROGRESS NOTES
Portions of this note are generated with voice recognition software. Typographical errors may exist.       Patient Name:DEIDRE ALSTON  Patient MRN:   [id number]    History of Present Illness   ================================================================  DEIDRE ALSTON is a 38 y.o. male here for primary care visit for  Chief Complaint   Patient presents with    Hypertension       Past Medical History:   Diagnosis Date    COPD (chronic obstructive pulmonary disease)     Hypertension     Syncope     Tobacco use 7/20/2017       Past Surgical History:   Procedure Laterality Date    LUNG SURGERY         Review of patient's allergies indicates:  No Known Allergies    Current Outpatient Medications on File Prior to Visit   Medication Sig Dispense Refill    nitroGLYCERIN (NITROSTAT) 0.4 MG SL tablet Place 1 tablet (0.4 mg total) under the tongue every 5 (five) minutes as needed for Chest pain. 20 tablet 12    tiotropium (SPIRIVA) 18 mcg inhalation capsule Inhale 1 capsule (18 mcg total) into the lungs once daily. Controller 90 capsule 0    aspirin (ECOTRIN) 81 MG EC tablet Take 1 tablet (81 mg total) by mouth once daily. 30 tablet 12    pantoprazole (PROTONIX) 40 MG tablet Take 1 tablet (40 mg total) by mouth once daily. 30 tablet 11     No current facility-administered medications on file prior to visit.        History reviewed. No pertinent family history.    Social History     Socioeconomic History    Marital status: Single     Spouse name: Not on file    Number of children: Not on file    Years of education: Not on file    Highest education level: Not on file   Social Needs    Financial resource strain: Not on file    Food insecurity - worry: Not on file    Food insecurity - inability: Not on file    Transportation needs - medical: Not on file    Transportation needs - non-medical: Not on file   Occupational History    Not on file   Tobacco Use    Smoking status: Current Every Day  Smoker     Packs/day: 0.50    Smokeless tobacco: Never Used   Substance and Sexual Activity    Alcohol use: Yes    Drug use: No    Sexual activity: Not on file   Other Topics Concern    Not on file   Social History Narrative    Not on file       Social History     Substance and Sexual Activity   Sexual Activity Not on file         SUBJECTIVE:    The patient states that about 2 years ago he started to have episodes of syncope which prompted multiple visits to outside emergency rooms.  When he finally established with a primary care provider he started to receive detailed cardiovascular evaluation some of which is available at [facility].  The patient states that at the end of an exhaustive cardiovascular evaluation the final diagnosis was anxiety with panic attacks.  The patient states that he still has significant symptoms of anxiety more pronounced with psychomotor agitation and becoming overwhelmed with stressful situations at work.  The patient feels that some of his professional advancement has been limited by the anxiety.  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  The patient denies alcohol or substance misuse or abuse.  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    If the patient has a history of 8 years in the [employer] and states that he did not developed posttraumatic disorder.  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  Although he states that he does not have posttraumatic stress he states that recertification for his current job for [employer] involves exercises in open water similar to his time in the [employer].  He states that he gets intense anticipatory anxiety when it comes time to get recertified.     The patient is very reluctant to establish care with a counselor to further explore the reasons that he developed intense and  repetitive panic attacks.  The patient states that at the time that they began they came out of nowhere. He feels that he had typical psychosocial stressors and he does not understand why they happened.  He is satisfied because they have not recurred and for this reason does not want to pursue additional evaluation.  He started Cymbalta 30 mg and tolerated increasing dosage to 60 mg and feels that this has helped to reduce anxiety symptoms.    If the patient is willing to pursue self-directed learning for anxiety and the biliotherapy.    The patient has been smoking since he was 16 years old.  States he has smoked consistently about 1 and half packs per day.  He is very reluctant to pursue cessation or nicotine replacement therapy.    The patient uses his inhaler sporadically.  He states that he will use them only when he feels like he is coughing more than usual.  He does not appreciate the necessity of using them daily to improve lung function.      Medications Reviewed and Updated    Past medical, family, and social histories were reviewed and updated.    Review of Systems negative unless otherwise noted in history of present illness-  ROS      General ROS: negative  Psychological ROS: negative  ENT ROS: negative  Endocrine ROS: Negative  Allergy and Immunology ROS: negative  Pulmonary ROS: Negative  Cardiovascular ROS: negative  Gastrointestinal ROS: negative  Genito-Urinary ROS: negative  Musculoskeletal ROS: negative  Neurological ROS: negative  Dermatological ROS: negative      Allergic:  Review of patient's allergies indicates:  No Known Allergies    OBJECTIVE:  BP: 124/72 Pulse: 98    Wt Readings from Last 3 Encounters:   08/15/18 83.2 kg (183 lb 6.8 oz)   07/25/18 82.6 kg (182 lb 1.6 oz)   02/21/18 88.5 kg (195 lb)    Body mass index is 23.55 kg/m².  Previous Blood Pressure Readings :   BP Readings from Last 3 Encounters:   08/15/18 124/72   07/25/18 (!) 135/91   02/21/18 (!) 128/98       Physical  Exam    GEN: healthy appearing  HEENT: sclera non-icteric, conjunctiva clear  CV: no peripheral edema. Regular rate and rhythm.  No murmurs no carotid bruits.  PULM: breathing non-labored audible and expiratory wheezing.  Bilateral lung fields.  The  ABD: supple. protuberant abdomen.   PSYCH:  Significant psychomotor agitation.  Nonpressured speech.  Normal thought content.  MSK: able to rise from chair without assistance  SKIN: normal skin turgor      Pertinent Labs Reviewed           ASSESSMENT/PLAN:    Annual physical exam    Essential hypertension  -     Lipid panel; Standing    RLS (restless legs syndrome)  -     CBC auto differential; Standing; Expected date: 08/15/2018  -     Ferritin; Future; Expected date: 08/15/2018    Anxiety  -     TSH; Standing    Chronic obstructive pulmonary disease, unspecified COPD type  -     Comprehensive metabolic panel; Standing  -     CBC auto differential; Standing; Expected date: 08/15/2018    Severe tobacco dependence    Other orders  -     lisinopril-hydrochlorothiazide (PRINZIDE,ZESTORETIC) 20-12.5 mg per tablet; Take 1 tablet by mouth once daily.  Dispense: 90 tablet; Refill: 3  -     ipratropium-albuterol (COMBIVENT)  mcg/actuation inhaler; Inhale 1 puff into the lungs every 4 (four) hours as needed for Wheezing. Rescue  Dispense: 4 g; Refill: 11  -     DULoxetine (CYMBALTA) 60 MG capsule; Take 1 capsule (60 mg total) by mouth once daily.  Dispense: 90 capsule; Refill: 1  -     rOPINIRole (REQUIP) 0.25 MG tablet; Take 1 tablet (0.25 mg total) by mouth nightly as needed.  Dispense: 90 tablet; Refill: 0          Future Appointments   Date Time Provider Department Center   8/22/2018  8:40 AM APPOINTMENT LAB, [facility]   2/18/2019  3:20 PM Benny Dang MD [facility]       Benny Dang  8/16/2018  8:02 AM

## 2018-10-26 RX ORDER — LISINOPRIL AND HYDROCHLOROTHIAZIDE 12.5; 2 MG/1; MG/1
1 TABLET ORAL DAILY
Qty: 90 TABLET | Refills: 3 | OUTPATIENT
Start: 2018-10-26 | End: 2019-10-26

## 2018-10-29 ENCOUNTER — TELEPHONE (OUTPATIENT)
Dept: INTERNAL MEDICINE | Facility: CLINIC | Age: 38
End: 2018-10-29

## 2018-10-29 NOTE — TELEPHONE ENCOUNTER
----- Message from Benny Dang MD sent at 10/26/2018  4:36 PM CDT -----  Patient is asking for medication refill but he never showed up for his laboratory appointment.  Please reschedule his laboratory appointment and then when his laboratory results are done I will renew his medication.  This is for safety reasons.  After he completes his blood work you should send a message to us to authorize the refill

## 2019-05-07 ENCOUNTER — OFFICE VISIT (OUTPATIENT)
Dept: INTERNAL MEDICINE | Facility: CLINIC | Age: 39
End: 2019-05-07
Payer: COMMERCIAL

## 2019-05-07 ENCOUNTER — LAB VISIT (OUTPATIENT)
Dept: LAB | Facility: HOSPITAL | Age: 39
End: 2019-05-07
Attending: INTERNAL MEDICINE
Payer: COMMERCIAL

## 2019-05-07 VITALS
SYSTOLIC BLOOD PRESSURE: 124 MMHG | HEIGHT: 74 IN | WEIGHT: 187.38 LBS | DIASTOLIC BLOOD PRESSURE: 70 MMHG | OXYGEN SATURATION: 97 % | HEART RATE: 120 BPM | BODY MASS INDEX: 24.05 KG/M2

## 2019-05-07 DIAGNOSIS — Z13.6 ENCOUNTER FOR LIPID SCREENING FOR CARDIOVASCULAR DISEASE: ICD-10-CM

## 2019-05-07 DIAGNOSIS — M54.89 INTERSCAPULAR PAIN: ICD-10-CM

## 2019-05-07 DIAGNOSIS — I10 ESSENTIAL HYPERTENSION: ICD-10-CM

## 2019-05-07 DIAGNOSIS — Z11.4 ENCOUNTER FOR SCREENING FOR HIV: ICD-10-CM

## 2019-05-07 DIAGNOSIS — R94.31 ABNORMAL EKG: ICD-10-CM

## 2019-05-07 DIAGNOSIS — J44.9 CHRONIC OBSTRUCTIVE PULMONARY DISEASE, UNSPECIFIED COPD TYPE: ICD-10-CM

## 2019-05-07 DIAGNOSIS — R10.9 ABDOMINAL PAIN, UNSPECIFIED ABDOMINAL LOCATION: ICD-10-CM

## 2019-05-07 DIAGNOSIS — Z00.00 ANNUAL PHYSICAL EXAM: Primary | ICD-10-CM

## 2019-05-07 DIAGNOSIS — G25.81 RLS (RESTLESS LEGS SYNDROME): ICD-10-CM

## 2019-05-07 DIAGNOSIS — Z13.220 ENCOUNTER FOR LIPID SCREENING FOR CARDIOVASCULAR DISEASE: ICD-10-CM

## 2019-05-07 DIAGNOSIS — F41.9 ANXIETY: ICD-10-CM

## 2019-05-07 PROCEDURE — 99396 PREV VISIT EST AGE 40-64: CPT | Mod: S$GLB,,, | Performed by: INTERNAL MEDICINE

## 2019-05-07 PROCEDURE — 93010 ELECTROCARDIOGRAM REPORT: CPT | Mod: S$GLB,,, | Performed by: STUDENT IN AN ORGANIZED HEALTH CARE EDUCATION/TRAINING PROGRAM

## 2019-05-07 PROCEDURE — 80053 COMPREHEN METABOLIC PANEL: CPT

## 2019-05-07 PROCEDURE — 99999 PR PBB SHADOW E&M-EST. PATIENT-LVL III: ICD-10-PCS | Mod: PBBFAC,,, | Performed by: INTERNAL MEDICINE

## 2019-05-07 PROCEDURE — 36415 COLL VENOUS BLD VENIPUNCTURE: CPT | Mod: PO

## 2019-05-07 PROCEDURE — 83880 ASSAY OF NATRIURETIC PEPTIDE: CPT

## 2019-05-07 PROCEDURE — 93010 EKG 12-LEAD: ICD-10-PCS | Mod: S$GLB,,, | Performed by: STUDENT IN AN ORGANIZED HEALTH CARE EDUCATION/TRAINING PROGRAM

## 2019-05-07 PROCEDURE — 82103 ALPHA-1-ANTITRYPSIN TOTAL: CPT

## 2019-05-07 PROCEDURE — 99999 PR PBB SHADOW E&M-EST. PATIENT-LVL III: CPT | Mod: PBBFAC,,, | Performed by: INTERNAL MEDICINE

## 2019-05-07 PROCEDURE — 99396 PR PREVENTIVE VISIT,EST,40-64: ICD-10-PCS | Mod: S$GLB,,, | Performed by: INTERNAL MEDICINE

## 2019-05-07 PROCEDURE — 80061 LIPID PANEL: CPT

## 2019-05-07 PROCEDURE — 93005 EKG 12-LEAD: ICD-10-PCS | Mod: S$GLB,,, | Performed by: INTERNAL MEDICINE

## 2019-05-07 PROCEDURE — 93005 ELECTROCARDIOGRAM TRACING: CPT | Mod: S$GLB,,, | Performed by: INTERNAL MEDICINE

## 2019-05-07 PROCEDURE — 86703 HIV-1/HIV-2 1 RESULT ANTBDY: CPT

## 2019-05-07 RX ORDER — ROPINIROLE 0.25 MG/1
0.25 TABLET, FILM COATED ORAL NIGHTLY PRN
Qty: 90 TABLET | Refills: 0 | Status: SHIPPED | OUTPATIENT
Start: 2019-05-07 | End: 2023-04-19

## 2019-05-07 RX ORDER — PANTOPRAZOLE SODIUM 40 MG/1
40 TABLET, DELAYED RELEASE ORAL DAILY PRN
Qty: 90 TABLET | Refills: 0 | Status: SHIPPED | OUTPATIENT
Start: 2019-05-07 | End: 2019-08-14

## 2019-05-07 RX ORDER — DULOXETIN HYDROCHLORIDE 60 MG/1
60 CAPSULE, DELAYED RELEASE ORAL DAILY
Qty: 90 CAPSULE | Refills: 0 | Status: SHIPPED | OUTPATIENT
Start: 2019-05-07 | End: 2019-07-09 | Stop reason: SDUPTHER

## 2019-05-07 RX ORDER — LISINOPRIL AND HYDROCHLOROTHIAZIDE 12.5; 2 MG/1; MG/1
1 TABLET ORAL DAILY
Qty: 90 TABLET | Refills: 0 | Status: SHIPPED | OUTPATIENT
Start: 2019-05-07 | End: 2019-07-09 | Stop reason: SDUPTHER

## 2019-05-07 RX ORDER — TIOTROPIUM BROMIDE 18 UG/1
18 CAPSULE ORAL; RESPIRATORY (INHALATION) DAILY
Qty: 90 CAPSULE | Refills: 0 | Status: SHIPPED | OUTPATIENT
Start: 2019-05-07 | End: 2020-02-12 | Stop reason: SDUPTHER

## 2019-05-07 NOTE — PROGRESS NOTES
Portions of this note are generated with voice recognition software. Typographical errors may exist.     SUBJECTIVE:    This is a/an 38 y.o. 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 It is not severe to the point that he has to stop working but has become something that becomes more of a recurring problem.  Self care measures have been minimal.  Patient denies any sensory or coordination deficits in the upper extremities.  States that he does have occasional pain radiating down his right shoulder into the fingers with tingling.  Patient states that this happens whenever he is at work and less often when he has recovered and is not working.  He continues to smoke cigarettes heavily.  States that he is not interested in quitting.  Patient states he has never been diagnosed with peripheral arterial disease coronary artery disease. Patient denies any usual chest discomfort along with the symptoms.    Patient states that he has been a heavy smoker but has never been screened for alpha-1 antitrypsin.  States he does not know anybody in the family that has had an for the med as young as him.  States that he understands the reasons and indications for doing the screening test today    Patient states that he continues Cymbalta because this is what was started many years ago.  He does not follow anybody specifically for mental health reasons.    Medications Reviewed and Updated    Past medical, family, and social histories were reviewed and updated.    Review of Systems negative unless otherwise noted in history of present illness-  ROS    General ROS: negative  Psychological ROS: negative  ENT ROS: negative  Endocrine ROS: Negative  Allergy and Immunology ROS:  negative  Cardiovascular ROS: negative  Pulmonary ROS: Negative  Gastrointestinal ROS: negative  Genito-Urinary ROS: negative  Musculoskeletal ROS: negative  Neurological ROS: negative  Dermatological ROS: negative        Allergic:  Review of patient's allergies indicates:  No Known Allergies    OBJECTIVE:  BP: 124/70 Pulse: (!) 120    Wt Readings from Last 3 Encounters:   05/07/19 85 kg (187 lb 6.3 oz)   08/15/18 83.2 kg (183 lb 6.8 oz)   07/25/18 82.6 kg (182 lb 1.6 oz)    Body mass index is 24.06 kg/m².  Previous Blood Pressure Readings :   BP Readings from Last 3 Encounters:   05/07/19 124/70   08/15/18 124/72   07/25/18 (!) 135/91       Physical Exam    GEN: No apparent distress  HEENT: sclera non-icteric, conjunctiva clear  CV: no peripheral edema regular rate and rhythm. No murmurs.  No carotid bruits.  PULM: breathing non-labored  ABD: non, protuberant abdomen.  PSYCH:  Anxious affect  MSK: able to rise from chair without assistance no significant point tenderness intrascapular are paraspinal muscles.  SKIN: normal skin turgor    Pertinent Labs Reviewed       ASSESSMENT/PLAN:    Annual physical exam    Interscapular pain  -     EKG 12-lead; Future  -     Ambulatory Referral to Physical/Occupational Therapy  -     Comprehensive metabolic panel; Standing    Chronic obstructive pulmonary disease, unspecified COPD type  -     ALPHA 1 ANTITRYPSIN DEFIICIENCY PROFILE; Future; Expected date: 05/07/2019  -     Comprehensive metabolic panel; Standing  -     tiotropium (SPIRIVA) 18 mcg inhalation capsule; Inhale 1 capsule (18 mcg total) into the lungs once daily. Controller  Dispense: 90 capsule; Refill: 0  -     ipratropium-albuterol (COMBIVENT)  mcg/actuation inhaler; Inhale 1 puff into the lungs every 4 (four) hours as needed for Wheezing. Rescue  Dispense: 4 g; Refill: 3    Encounter for lipid screening for cardiovascular disease  -     Lipid panel; Standing    Encounter for screening for HIV  -     HIV 1/2  Ag/Ab (4th Gen); Standing    Abnormal EKG  -     Brain natriuretic peptide; Standing    Abdominal pain, unspecified abdominal location  -     pantoprazole (PROTONIX) 40 MG tablet; Take 1 tablet (40 mg total) by mouth daily as needed (gastritis). Further refills require visit with Dr. Dang  Dispense: 90 tablet; Refill: 0    Essential hypertension  -     lisinopril-hydrochlorothiazide (PRINZIDE,ZESTORETIC) 20-12.5 mg per tablet; Take 1 tablet by mouth once daily. Further refills require visit with Dr. Dang  Dispense: 90 tablet; Refill: 0    Anxiety  -     DULoxetine (CYMBALTA) 60 MG capsule; Take 1 capsule (60 mg total) by mouth once daily. Further refills require visit with Dr. Dang  Dispense: 90 capsule; Refill: 0    RLS (restless legs syndrome)  -     rOPINIRole (REQUIP) 0.25 MG tablet; Take 1 tablet (0.25 mg total) by mouth nightly as needed. Further refills require visit with Dr. Dang  Dispense: 90 tablet; Refill: 0          Future Appointments   Date Time Provider Department Center   6/17/2019  2:20 PM Benny Dang MD [facility]       Benny Dang  5/7/2019  5:34 PM

## 2019-05-08 LAB
ALBUMIN SERPL BCP-MCNC: 4.6 G/DL (ref 3.5–5.2)
ALP SERPL-CCNC: 47 U/L (ref 55–135)
ALT SERPL W/O P-5'-P-CCNC: 15 U/L (ref 10–44)
ANION GAP SERPL CALC-SCNC: 11 MMOL/L (ref 8–16)
AST SERPL-CCNC: 20 U/L (ref 10–40)
BILIRUB SERPL-MCNC: 0.8 MG/DL (ref 0.1–1)
BNP SERPL-MCNC: <10 PG/ML (ref 0–99)
BUN SERPL-MCNC: 15 MG/DL (ref 6–20)
CALCIUM SERPL-MCNC: 10.5 MG/DL (ref 8.7–10.5)
CHLORIDE SERPL-SCNC: 100 MMOL/L (ref 95–110)
CHOLEST SERPL-MCNC: 230 MG/DL (ref 120–199)
CHOLEST/HDLC SERPL: 2.6 {RATIO} (ref 2–5)
CO2 SERPL-SCNC: 26 MMOL/L (ref 23–29)
CREAT SERPL-MCNC: 1 MG/DL (ref 0.5–1.4)
EST. GFR  (AFRICAN AMERICAN): >60 ML/MIN/1.73 M^2
EST. GFR  (NON AFRICAN AMERICAN): >60 ML/MIN/1.73 M^2
GLUCOSE SERPL-MCNC: 83 MG/DL (ref 70–110)
HDLC SERPL-MCNC: 87 MG/DL (ref 40–75)
HDLC SERPL: 37.8 % (ref 20–50)
HIV 1+2 AB+HIV1 P24 AG SERPL QL IA: NEGATIVE
LDLC SERPL CALC-MCNC: 122.8 MG/DL (ref 63–159)
NONHDLC SERPL-MCNC: 143 MG/DL
POTASSIUM SERPL-SCNC: 4 MMOL/L (ref 3.5–5.1)
PROT SERPL-MCNC: 7.8 G/DL (ref 6–8.4)
SODIUM SERPL-SCNC: 137 MMOL/L (ref 136–145)
TRIGL SERPL-MCNC: 101 MG/DL (ref 30–150)

## 2019-05-14 LAB
A1AT PROTEOTYPE S/Z, LC-MS/MS: NORMAL
A1AT SERPL NEPH-MCNC: 111 MG/DL (ref 100–190)

## 2019-05-17 ENCOUNTER — CLINICAL SUPPORT (OUTPATIENT)
Dept: REHABILITATION | Facility: HOSPITAL | Age: 39
End: 2019-05-17
Payer: COMMERCIAL

## 2019-05-17 DIAGNOSIS — M54.6 THORACIC SPINE PAIN: ICD-10-CM

## 2019-05-17 PROCEDURE — 97162 PT EVAL MOD COMPLEX 30 MIN: CPT | Mod: PN | Performed by: PHYSICAL THERAPIST

## 2019-05-17 PROCEDURE — 97140 MANUAL THERAPY 1/> REGIONS: CPT | Mod: PN | Performed by: PHYSICAL THERAPIST

## 2019-05-17 PROCEDURE — 97110 THERAPEUTIC EXERCISES: CPT | Mod: PN | Performed by: PHYSICAL THERAPIST

## 2019-05-17 NOTE — PLAN OF CARE
[facility]  Physical Therapy Initial Evaluation    Name: Erik Mims  Clinic Number: [id number]    Therapy Diagnosis:   Encounter Diagnosis   Name Primary?    Thoracic spine pain      Physician: Benny Dang*    Physician Orders: PT Eval and Treat   Medical Diagnosis from Referral: M54.89 (ICD-10-CM) - Interscapular pain  Evaluation Date: 5/17/2019  Authorization Period Expiration: 02/28/2020  Plan of Care Expiration: 6/28/2019  Visit # / Visits authorized: 1/ 20 (hard cap)  FOTO: 1/10    Time In: 210  Time Out: 305  Total Billable Time: 55 minutes     Precautions: Standard    Subjective   Date of onset: Previous few months  History of current condition - Erik reports: pain along thoracic spine and ct junction which is worse when working. Pt reports neural signs that will run down RUE at times and has become more frequent. Pt reports having to lift heavy objects and get into tight spaces at work which also bring symptoms on. Pt reports lifting objets in RUE have become more increasingly difficult.      Medical History:   Past Medical History:   Diagnosis Date    COPD (chronic obstructive pulmonary disease)     Hypertension     Syncope     Tobacco use 7/20/2017       Surgical History:   Erik Mims  has a past surgical history that includes Lung surgery.    Medications:   Erik has a current medication list which includes the following prescription(s): aspirin, duloxetine, ipratropium-albuterol, lisinopril-hydrochlorothiazide, nitroglycerin, pantoprazole, ropinirole, and tiotropium.    Allergies:   Review of patient's allergies indicates:  No Known Allergies     Imaging, None    Prior Therapy: None  Social History:  lives alone  Occupation: Works in [employer]  Prior Level of Function: Able to perform work duties without pain  Current Level of Function: unable to lift objects in RUE without pain.     Pain:  Current 4/10, worst 7/10, best 1/10  "  Location: right trunk  Description: Aching, Dull, Burning, Throbbing, Grabbing, Tight and Tingling  Aggravating Factors: Lifting  Easing Factors: rest    Pts goals: To perform work duties without pain    Objective     Posture: Thoracic kyphosis, rounded shoulders, forward head  Palpation: Minimal tenderness along thoracic spine and right shoulder  Sensation: no deficits noted     Range of Motion/Strength:   CERVICAL AROM Pain/Dysfunction with Movement   Flexion WNL More discomfort than other movements.   Extension WNL    Right side bending WNL    Left side bending WNL    Right rotation WNL    Left rotation WNL      Thoracolumbar AROM Pain/Dysfunction with Movement   Flexion WNL    Extension Limited due to posture    Right rotation WNL    Left rotation WNL Discomfort on left side of thoracic spine     Shoulder Right Left Pain/Dysfunction with Movement   AROM/PROM WNL WNL        U/E MMT Right Left Pain/Dysfunction with Movement   Shoulder Flexion 5/5 5/5    Shoulder Abduction 5/5 5/5 Discomfort on R   Shoulder IR 5/5 5/5    Shoulder ER  @ 0* Abduction 5/5 5/5          Joint Mobility:   - Cervical: Stiffness along CT junction with cervical flexion  - Thoracic: Stiffness in extension plane due to posture        Special Tests: Spurlings (-) on R, Drop arm (-) on R, nerve provocation on RUE (-)      CMS Impairment/Limitation/Restriction for FOTO Thoracic Survey    Therapist reviewed FOTO scores for Erik Mims on 5/17/2019.   FOTO documents entered into [employer] - see Media section.    Limitation Score: 39%         TREATMENT   Treatment Time In: 225  Treatment Time Out: 305  Total Treatment time separate from Evaluation: 40 minutes    Erik received therapeutic exercises to develop strength, endurance and posture for 25 minutes including:  Seated thoracic extensions with bolster 30x  Scapular retractions    Pink SC 3x12  Horizontal abduction    Bilateral shoulder RTB 2x10  Pec Stretch     30" x 3  Standing " bilateral ER against wall  RTB 3x10    Erik received the following manual therapy techniques: Joint mobilizations and Soft tissue Mobilization were applied to the: thoracic spine for 15 minutes, including:  ASTYM to bilateral thoracic spine  CT junction seated manipulation      Home Exercises Provided and Patient Education Provided     Education provided:   - Postural education      Assessment   Erik is a 38 y.o. male referred to outpatient Physical Therapy with a medical diagnosis of thoracic pain. Pt presents with pain along CT junction and thoracic spine area with tenderness noted along levator scapula attachments. Pt is able to tolerate manual treatments and therex without increase in pain. Pt educated on posture, especially at work.     Pt prognosis is Excellent.   Pt will benefit from skilled outpatient Physical Therapy to address the deficits stated above and in the chart below, provide pt/family education, and to maximize pt's level of independence.     Plan of care discussed with patient: Yes  Pt's spiritual, cultural and educational needs considered and patient is agreeable to the plan of care and goals as stated below:     Anticipated Barriers for therapy: work schedule.     Medical Necessity is demonstrated by the following  History  Co-morbidities and personal factors that may impact the plan of care Co-morbidities:   None    Personal Factors:   lifestyle     moderate   Examination  Body Structures and Functions, activity limitations and participation restrictions that may impact the plan of care Body Regions:   trunk    Body Systems:    ROM  strength    Participation Restrictions:   Lifting    Activity limitations:   Learning and applying knowledge  no deficits    General Tasks and Commands  undertaking a single task    Communication  no deficits    Mobility  lifting and carrying objects    Self care  no deficits    Domestic Life  doing house work (cleaning house, washing dishes,  laundry)    Interactions/Relationships  no deficits    Life Areas  no deficits    Community and Social Life  community life  recreation and leisure         high   Clinical Presentation evolving clinical presentation with changing clinical characteristics moderate   Decision Making/ Complexity Score: moderate     Goals:  Long Term Goals (6 Weeks):   1. Pt will be independent with HEP to supplement PT in improving pain free thoracic mobility  4. Pt will demonstrate improved sitting posture to decrease pain experienced in head and neck.  1. Pt will improve FOTO to </=30% limitation to improve perceived limitation with changing and maintaining mobility.  4. Pt will report pain </= 2/10 at worst to promote return to PLOF.  5. Pt will report pain </= 2/10 with cervical AROM in all planes to promote functional QOL.    Plan   Plan of care Certification: 5/17/2019 to 6/28/2019.    Outpatient Physical Therapy 2 times weekly for 6 weeks to include the following interventions: Manual Therapy, Moist Heat/ Ice, Neuromuscular Re-ed, Patient Education, Self Care, Therapeutic Activites and Therapeutic Exercise, ASTYM, Kinesiotaping PRN, Functional Dry Needling    Juventino Allison, PT, DPT

## 2019-05-19 PROBLEM — M54.6 THORACIC SPINE PAIN: Status: ACTIVE | Noted: 2019-05-19

## 2019-05-28 ENCOUNTER — TELEPHONE (OUTPATIENT)
Dept: REHABILITATION | Facility: HOSPITAL | Age: 39
End: 2019-05-28

## 2019-05-30 ENCOUNTER — CLINICAL SUPPORT (OUTPATIENT)
Dept: REHABILITATION | Facility: HOSPITAL | Age: 39
End: 2019-05-30
Payer: COMMERCIAL

## 2019-05-30 DIAGNOSIS — M54.6 THORACIC SPINE PAIN: ICD-10-CM

## 2019-05-30 PROCEDURE — 97110 THERAPEUTIC EXERCISES: CPT | Mod: PN

## 2019-05-30 PROCEDURE — 97140 MANUAL THERAPY 1/> REGIONS: CPT | Mod: PN

## 2019-05-30 NOTE — PROGRESS NOTES
"  Physical Therapy Daily Treatment Note     Name: Erik Mims  Clinic Number: [id number]    Therapy Diagnosis:   Encounter Diagnosis   Name Primary?    Thoracic spine pain      Physician: Benny Dang*    Visit Date: 5/30/2019    Physician Orders: PT Eval and Treat   Medical Diagnosis from Referral: M54.89 (ICD-10-CM) - Interscapular pain  Evaluation Date: 5/17/2019  Authorization Period Expiration: 02/28/2020  Plan of Care Expiration: 6/28/2019  Visit # / Visits authorized: 2/ 20 (hard cap)  FOTO: 2/10  PTA visit: 1/6     Time In: 1700  Time Out: 1745  Total Billable Time: 45 minutes (1 MT, 2 TE)     Precautions: Standard         Subjective     Pt reports: he continues to have dull, ache in CT junction.  "Its just annoying"  No HEP given  Response to previous treatment: no adverse reaction  Functional change: none    Pain: 1/10  Location: left CT junction      Objective     Erik received the following manual therapy techniques: Myofacial release and Soft tissue Mobilization were applied to the: bilateral CT junction and thoracic spine for 10 minutes    Erik received therapeutic exercises to develop strength and posture for 35 minutes including:    Seated thoracic extensions with bolster         30x  Scapular retractions                                        Pink SC 3x12  Horizontal abduction                                       Bilateral shoulder RTB 2x10  Pec Stretch                                                     30" x 3  Standing bilateral ER against wall                  RTB 3x10  Sidelying open books                                      2x10 B  Sidelying ER                                                    2x10 B      Home Exercises Provided and Patient Education Provided     Education provided:   - posture awareness with work      Assessment     Pt tolerates therapy activities without difficulties or c/o increased pain  Erik is progressing well towards his goals.   Pt " prognosis is Good.     Pt will continue to benefit from skilled outpatient physical therapy to address the deficits listed in the problem list box on initial evaluation, provide pt/family education and to maximize pt's level of independence in the home and community environment.     Pt's spiritual, cultural and educational needs considered and pt agreeable to plan of care and goals.    Anticipated barriers to physical therapy: work schedule    Goals:   Long Term Goals (6 Weeks):   1. Pt will be independent with HEP to supplement PT in improving pain free thoracic mobility  4. Pt will demonstrate improved sitting posture to decrease pain experienced in head and neck.  1. Pt will improve FOTO to </=30% limitation to improve perceived limitation with changing and maintaining mobility.  4. Pt will report pain </= 2/10 at worst to promote return to PLOF.  5. Pt will report pain </= 2/10 with cervical AROM in all planes to promote functional QOL.    Plan     Cont POC to progress towards established goals    Latonya Yeboah, NARESH

## 2019-07-08 ENCOUNTER — HOSPITAL ENCOUNTER (EMERGENCY)
Facility: HOSPITAL | Age: 39
Discharge: HOME | End: 2019-07-08
Attending: EMERGENCY MEDICINE
Payer: COMMERCIAL

## 2019-07-08 ENCOUNTER — TELEPHONE (OUTPATIENT)
Dept: INTERNAL MEDICINE | Facility: CLINIC | Age: 39
End: 2019-07-08

## 2019-07-08 VITALS
BODY MASS INDEX: 24.38 KG/M2 | TEMPERATURE: 97 F | DIASTOLIC BLOOD PRESSURE: 103 MMHG | WEIGHT: 190 LBS | OXYGEN SATURATION: 100 % | RESPIRATION RATE: 20 BRPM | HEART RATE: 86 BPM | SYSTOLIC BLOOD PRESSURE: 173 MMHG | HEIGHT: 74 IN

## 2019-07-08 DIAGNOSIS — S39.012A LUMBAR STRAIN, INITIAL ENCOUNTER: Primary | ICD-10-CM

## 2019-07-08 DIAGNOSIS — M62.830 BACK MUSCLE SPASM: ICD-10-CM

## 2019-07-08 LAB
BILIRUB UR QL STRIP: NEGATIVE
CLARITY UR: CLEAR
COLOR UR: YELLOW
GLUCOSE UR QL STRIP: NEGATIVE
HGB UR QL STRIP: NEGATIVE
KETONES UR QL STRIP: NEGATIVE
LEUKOCYTE ESTERASE UR QL STRIP: NEGATIVE
NITRITE UR QL STRIP: NEGATIVE
PH UR STRIP: 8 [PH] (ref 5–8)
PROT UR QL STRIP: NEGATIVE
SP GR UR STRIP: 1.01 (ref 1–1.03)
URN SPEC COLLECT METH UR: NORMAL
UROBILINOGEN UR STRIP-ACNC: NEGATIVE EU/DL

## 2019-07-08 PROCEDURE — 96375 TX/PRO/DX INJ NEW DRUG ADDON: CPT

## 2019-07-08 PROCEDURE — 25000003 PHARM REV CODE 250: Performed by: NURSE PRACTITIONER

## 2019-07-08 PROCEDURE — 81003 URINALYSIS AUTO W/O SCOPE: CPT

## 2019-07-08 PROCEDURE — 63600175 PHARM REV CODE 636 W HCPCS: Performed by: NURSE PRACTITIONER

## 2019-07-08 PROCEDURE — 96374 THER/PROPH/DIAG INJ IV PUSH: CPT

## 2019-07-08 PROCEDURE — 99284 EMERGENCY DEPT VISIT MOD MDM: CPT | Mod: 25

## 2019-07-08 RX ORDER — LISINOPRIL 10 MG/1
20 TABLET ORAL
Status: COMPLETED | OUTPATIENT
Start: 2019-07-08 | End: 2019-07-08

## 2019-07-08 RX ORDER — IBUPROFEN 800 MG/1
800 TABLET ORAL EVERY 6 HOURS PRN
Qty: 20 TABLET | Refills: 0 | Status: SHIPPED | OUTPATIENT
Start: 2019-07-08 | End: 2019-08-14

## 2019-07-08 RX ORDER — DIAZEPAM 10 MG/2ML
2 INJECTION INTRAMUSCULAR
Status: COMPLETED | OUTPATIENT
Start: 2019-07-08 | End: 2019-07-08

## 2019-07-08 RX ORDER — PREDNISONE 20 MG/1
40 TABLET ORAL
Status: COMPLETED | OUTPATIENT
Start: 2019-07-08 | End: 2019-07-08

## 2019-07-08 RX ORDER — KETOROLAC TROMETHAMINE 30 MG/ML
30 INJECTION, SOLUTION INTRAMUSCULAR; INTRAVENOUS
Status: COMPLETED | OUTPATIENT
Start: 2019-07-08 | End: 2019-07-08

## 2019-07-08 RX ORDER — PREDNISONE 20 MG/1
40 TABLET ORAL DAILY
Qty: 10 TABLET | Refills: 0 | Status: SHIPPED | OUTPATIENT
Start: 2019-07-08 | End: 2019-07-13

## 2019-07-08 RX ORDER — METHOCARBAMOL 500 MG/1
1000 TABLET, FILM COATED ORAL 3 TIMES DAILY
Qty: 30 TABLET | Refills: 0 | Status: SHIPPED | OUTPATIENT
Start: 2019-07-08 | End: 2019-07-13

## 2019-07-08 RX ORDER — HYDROCHLOROTHIAZIDE 12.5 MG/1
12.5 TABLET ORAL
Status: COMPLETED | OUTPATIENT
Start: 2019-07-08 | End: 2019-07-08

## 2019-07-08 RX ADMIN — DIAZEPAM 2 MG: 5 INJECTION, SOLUTION INTRAMUSCULAR; INTRAVENOUS at 09:07

## 2019-07-08 RX ADMIN — LISINOPRIL 20 MG: 10 TABLET ORAL at 09:07

## 2019-07-08 RX ADMIN — HYDROCHLOROTHIAZIDE 12.5 MG: 12.5 TABLET ORAL at 09:07

## 2019-07-08 RX ADMIN — PREDNISONE 40 MG: 20 TABLET ORAL at 10:07

## 2019-07-08 RX ADMIN — KETOROLAC TROMETHAMINE 30 MG: 30 INJECTION, SOLUTION INTRAMUSCULAR at 09:07

## 2019-07-08 NOTE — ED PROVIDER NOTES
Encounter Date: 7/8/2019       History     Chief Complaint   Patient presents with    Back Pain     39 year old male presents to ed via  ems cc of bilateral back pain with radiation to bilateral lower extremities patient denies fall or trauma states had coughing spell and lower back pain began     39-year-old male with presents the emergency room via EMS secondary to lower back pain that began this morning when he woke up.  Patient states that he was coughing and then immediately began to have lower back pain with shooting pain down his legs.  He denies previous back pain.  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  He denies injury or trauma. Patient states that he watched golf all day yesterday and did not do anything on Saturday.  He denies any numbness or tingling to his legs or loss of urine or bowel.    The history is provided by the patient and the EMS personnel.     Review of patient's allergies indicates:  No Known Allergies  Past Medical History:   Diagnosis Date    COPD (chronic obstructive pulmonary disease)     Hypertension     Syncope     Tobacco use 7/20/2017     Past Surgical History:   Procedure Laterality Date    ESOPHAGOGASTRODUODENOSCOPY (EGD) N/A 11/3/2017    Performed by Jayme Márquez Jr., [facility] at [facility]    LUNG SURGERY       History reviewed. No pertinent family history.  Social History     Tobacco Use    Smoking status: Current Every Day Smoker     Packs/day: 0.50    Smokeless tobacco: Never Used   Substance Use Topics    Alcohol use: Yes    Drug use: No     Review of Systems   Constitutional: Negative for fever.   HENT: Negative for sore throat.    Respiratory: Negative for shortness of breath.    Cardiovascular: Negative for chest pain.   Gastrointestinal: Negative for nausea.   Genitourinary: Negative for dysuria.   Musculoskeletal: Positive for back pain.   Skin: Negative for rash.    Neurological: Negative for weakness and numbness.   Hematological: Does not bruise/bleed easily.   All other systems reviewed and are negative.    Physical Exam     Initial Vitals [07/08/19 0756]   BP Pulse Resp Temp SpO2   (!) 150/104 93 20 98.9 °F (37.2 °C) 98 %      MAP       --         Physical Exam    Nursing note and vitals reviewed.  Constitutional: He appears well-developed and well-nourished.   HENT:   Head: Normocephalic and atraumatic.   Eyes: Conjunctivae and EOM are normal. Pupils are equal, round, and reactive to light.   Cardiovascular: Normal rate, regular rhythm, normal heart sounds and intact distal pulses. Exam reveals no gallop and no friction rub.    No murmur heard.  Pulmonary/Chest: Breath sounds normal. No respiratory distress. He has no wheezes. He has no rhonchi. He has no rales. He exhibits no tenderness.   Abdominal: Soft. Bowel sounds are normal. He exhibits no distension and no mass. There is no tenderness. There is no rebound and no guarding.   Musculoskeletal: He exhibits tenderness.        Lumbar back: He exhibits decreased range of motion, pain and spasm. He exhibits no bony tenderness, no swelling, no edema, no deformity, no laceration and normal pulse.        Back:    Neurological: He is alert and oriented to person, place, and time. He has normal strength.   Skin: Capillary refill takes less than 2 seconds. No abscess noted. No erythema.       ED Course   Procedures  Labs Reviewed   URINALYSIS, REFLEX TO URINE CULTURE    Narrative:     Preferred Collection Type->Urine, Clean Catch           Medical Decision Making:   Initial Assessment:   39-year-old male with presents the emergency room via EMS secondary to lower back pain that began this morning when he woke up.  Patient states that he was coughing and then immediately began to have lower back pain with shooting pain down his legs.  He denies previous back pain.  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  He denies injury or trauma. Patient states that he watched golf all day yesterday and did not do anything on Saturday.  He denies any numbness or tingling to his legs or loss of urine or bowel.    Differential Diagnosis:   Lumbar strain, herniated disc, cauda equina, kidney stone, muscle spasm  Clinical Tests:   Lab Tests: Ordered and Reviewed  The following lab test(s) were unremarkable: Urinalysis  ED Management:  Patient examined and noted to have muscle spasms to the right side of the lumbar region.  Patient given Valium and Toradol while in the ED.  Patient was able to ambulate while in the ED after receiving the Valium.  He continued to have spasms when changing positions.  He was advised to follow up with primary care for problem persisted past the week.  Patient did have any imaging done secondary to no injury. Patient discharged with Motrin, Robaxin and a short course of steroids to help with lumbar strain.  Patient also advised to complete exercises and apply warm compresses to his lower back as needed.  Patient given strict return precautions and voiced understanding of all discharge instructions.  Patient stable at discharge. No concerns for cauda equina as the patient did not have any loss of urine, bowel or saddle anesthesia.  Patient was also able to ambulate with good muscle strength.                   ED Course as of Jul 08 1209 Mon Jul 08, 2019   0802 BP(!): 150/104 [AT]   0802 Temp: 98.9 °F (37.2 °C) [AT]   0802 Temp src: Oral [AT]   0802 Pulse: 93 [AT]   0802 Resp: 20 [AT]   0802 SpO2: 98 % [AT]   0906 Pt has not taken his blood pressure medication in 5 days- pt given risks associated with uncontrolled hypertension    [AT]      ED Course User Index  [AT] PATT Pantoja     Clinical Impression:       ICD-10-CM ICD-9-CM   1. Lumbar strain, initial encounter S39.012A 847.2   2. Back muscle spasm M62.830 724.8                                 Sailaja Daily, [facility]  07/08/19 1221

## 2019-07-08 NOTE — TELEPHONE ENCOUNTER
----- Message from Terry Hopper sent at 7/8/2019  1:55 PM CDT -----  Contact: [phone]/self  Patient would like a referral to a back specialist.  Patient states he was seen in the ER today for his back issues and he would like to know what happen to him.    Please call back to assist at [phone]

## 2019-07-08 NOTE — TELEPHONE ENCOUNTER
Spoke with patient and appt scheduled for tomorrow at 4 pm for f/u ER visit. Patient voices  understanding.

## 2019-07-08 NOTE — ED NOTES
Patient presents to ED via EMS stretcher with c/o lower back pain 10/10. Patient states he was getting ready for work this morning and started having a coughing spell. Patient states he fell to the floor and couldn't get himself up. Stated he wiggle himself to get his phone to call his friend to assist him off the floor but friend was unsuccessfully in getting him off the floor. Friend then called EMS. Patient denies LOC.       APPEARANCE: Alert, oriented and in no acute distress.  CARDIAC: Normal rate and rhythm, no murmur heard. B/P ELEVATED.  PERIPHERAL VASCULAR: peripheral pulses present. Normal cap refill. No edema. Warm to touch.    RESPIRATORY:Normal rate and effort, breath sounds clear bilaterally throughout chest. Respirations are equal and unlabored no obvious signs of distress.  GASTRO: soft, bowel sounds normal, no tenderness, no abdominal distention.  MUSC: FULL ROM. No obvious deformity. BILATERAL LOWER BACK PAIN.  SKIN: Skin is warm and dry, normal skin turgor, mucous membranes moist.  NEURO: 5/5 strength major flexors/extensors bilaterally. Sensory intact to light touch bilaterally. [place] coma scale: eyes open spontaneously-4, oriented & converses-5, obeys commands-6. No neurological abnormalities.   MENTAL STATUS: awake, alert and aware of environment.  EYE: PERRL, both eyes: pupils brisk and reactive to light. Normal size.  ENT: EARS: no obvious drainage. NOSE: no active bleeding.

## 2019-07-09 ENCOUNTER — OFFICE VISIT (OUTPATIENT)
Dept: INTERNAL MEDICINE | Facility: CLINIC | Age: 39
End: 2019-07-09
Payer: COMMERCIAL

## 2019-07-09 ENCOUNTER — HOSPITAL ENCOUNTER (OUTPATIENT)
Dept: RADIOLOGY | Facility: HOSPITAL | Age: 39
Discharge: HOME | End: 2019-07-09
Attending: INTERNAL MEDICINE
Payer: COMMERCIAL

## 2019-07-09 ENCOUNTER — PATIENT OUTREACH (OUTPATIENT)
Dept: ADMINISTRATIVE | Facility: OTHER | Age: 39
End: 2019-07-09

## 2019-07-09 VITALS
WEIGHT: 186.31 LBS | DIASTOLIC BLOOD PRESSURE: 90 MMHG | OXYGEN SATURATION: 96 % | HEIGHT: 74 IN | SYSTOLIC BLOOD PRESSURE: 150 MMHG | HEART RATE: 92 BPM | BODY MASS INDEX: 23.91 KG/M2

## 2019-07-09 DIAGNOSIS — F41.9 ANXIETY: ICD-10-CM

## 2019-07-09 DIAGNOSIS — M54.50 ACUTE BILATERAL LOW BACK PAIN WITHOUT SCIATICA: ICD-10-CM

## 2019-07-09 DIAGNOSIS — D53.9 MACROCYTIC ANEMIA: ICD-10-CM

## 2019-07-09 DIAGNOSIS — M62.830 MUSCLE SPASM OF BACK: ICD-10-CM

## 2019-07-09 DIAGNOSIS — M54.50 ACUTE BILATERAL LOW BACK PAIN WITHOUT SCIATICA: Primary | ICD-10-CM

## 2019-07-09 DIAGNOSIS — R25.1 TREMOR OF BOTH HANDS: ICD-10-CM

## 2019-07-09 DIAGNOSIS — I10 ESSENTIAL HYPERTENSION: ICD-10-CM

## 2019-07-09 PROCEDURE — 3080F DIAST BP >= 90 MM HG: CPT | Mod: CPTII,S$GLB,, | Performed by: INTERNAL MEDICINE

## 2019-07-09 PROCEDURE — 3077F SYST BP >= 140 MM HG: CPT | Mod: CPTII,S$GLB,, | Performed by: INTERNAL MEDICINE

## 2019-07-09 PROCEDURE — 3008F BODY MASS INDEX DOCD: CPT | Mod: CPTII,S$GLB,, | Performed by: INTERNAL MEDICINE

## 2019-07-09 PROCEDURE — 72100 X-RAY EXAM L-S SPINE 2/3 VWS: CPT | Mod: 26,,, | Performed by: RADIOLOGY

## 2019-07-09 PROCEDURE — 99999 PR PBB SHADOW E&M-EST. PATIENT-LVL IV: ICD-10-PCS | Mod: PBBFAC,,, | Performed by: INTERNAL MEDICINE

## 2019-07-09 PROCEDURE — 72100 XR LUMBAR SPINE AP AND LATERAL: ICD-10-PCS | Mod: 26,,, | Performed by: RADIOLOGY

## 2019-07-09 PROCEDURE — 3077F PR MOST RECENT SYSTOLIC BLOOD PRESSURE >= 140 MM HG: ICD-10-PCS | Mod: CPTII,S$GLB,, | Performed by: INTERNAL MEDICINE

## 2019-07-09 PROCEDURE — 3008F PR BODY MASS INDEX (BMI) DOCUMENTED: ICD-10-PCS | Mod: CPTII,S$GLB,, | Performed by: INTERNAL MEDICINE

## 2019-07-09 PROCEDURE — 3080F PR MOST RECENT DIASTOLIC BLOOD PRESSURE >= 90 MM HG: ICD-10-PCS | Mod: CPTII,S$GLB,, | Performed by: INTERNAL MEDICINE

## 2019-07-09 PROCEDURE — 99214 OFFICE O/P EST MOD 30 MIN: CPT | Mod: S$GLB,,, | Performed by: INTERNAL MEDICINE

## 2019-07-09 PROCEDURE — 99999 PR PBB SHADOW E&M-EST. PATIENT-LVL IV: CPT | Mod: PBBFAC,,, | Performed by: INTERNAL MEDICINE

## 2019-07-09 PROCEDURE — 72100 X-RAY EXAM L-S SPINE 2/3 VWS: CPT | Mod: TC,FY,PO

## 2019-07-09 PROCEDURE — 99214 PR OFFICE/OUTPT VISIT, EST, LEVL IV, 30-39 MIN: ICD-10-PCS | Mod: S$GLB,,, | Performed by: INTERNAL MEDICINE

## 2019-07-09 RX ORDER — LISINOPRIL AND HYDROCHLOROTHIAZIDE 12.5; 2 MG/1; MG/1
1 TABLET ORAL DAILY
Qty: 90 TABLET | Refills: 0 | Status: SHIPPED | OUTPATIENT
Start: 2019-07-09 | End: 2019-12-13 | Stop reason: SDUPTHER

## 2019-07-09 RX ORDER — DULOXETIN HYDROCHLORIDE 60 MG/1
60 CAPSULE, DELAYED RELEASE ORAL DAILY
Qty: 90 CAPSULE | Refills: 0 | Status: SHIPPED | OUTPATIENT
Start: 2019-07-09 | End: 2019-12-13 | Stop reason: SDUPTHER

## 2019-07-09 NOTE — PROGRESS NOTES
Portions of this note are generated with voice recognition software. Typographical errors may exist.     SUBJECTIVE:    This is a/an 39 y.o. male here for primary care visit for  Chief Complaint   Patient presents with    Back Pain     ED follow up      The patient states that he was in his usual state of health until Monday morning.  When he awoke he did not have the lower back pain that eventually prompted him to see emergency medical evaluation.  States that he was getting ready like he normally does when he developed severe lower back pain. States that the onset was rapid.  Paraspinal pain lumbosacral level.  Perhaps some involvement into the gluteal region bilaterally but there was no extension beyond this level.  Movement in any direction prompted severe pain. On the floor he had to call on the telephone friends to come to the house to help him.  They were not able to get him off of the floor so for that reason ambulance was called.  This is the 1st such episode that the patient has had involving his lower back.  He states that he has had what he calls regular aches and pains.  States that in his previous  service there was no history of lower back trauma.  States that the only strains on his lower back would of been from caring heavy backpack but there was never a high velocity accident involving his back.    Patient states that he has improved steadily with regard to the pain since his presentation.  He is nearly done with the prednisone.  Continue with ibuprofen and Robaxin.  Not having significant problems with regard to stomach pain but is having some reduced appetite.    Patient states that he has never been diagnosed with a bulging disc in lower back.  Has never had a classic episode of sciatica.      Medications Reviewed and Updated    Past medical, family, and social histories were reviewed and updated.    Review of Systems negative unless otherwise noted in history of present  illness-  ROS    General ROS: negative  Psychological ROS: negative  ENT ROS: negative  Endocrine ROS: Negative  Allergy and Immunology ROS: negative  Cardiovascular ROS: negative  Pulmonary ROS: Negative  Gastrointestinal ROS: negative  Genito-Urinary ROS: negative  Musculoskeletal ROS: negative  Neurological ROS: negative        Allergic:  Review of patient's allergies indicates:  No Known Allergies    OBJECTIVE:  BP: (!) 140/94 Pulse: 92    Wt Readings from Last 3 Encounters:   07/09/19 84.5 kg (186 lb 4.6 oz)   07/08/19 86.2 kg (190 lb)   05/07/19 85 kg (187 lb 6.3 oz)    Body mass index is 23.92 kg/m².  Previous Blood Pressure Readings :   BP Readings from Last 3 Encounters:   07/09/19 (!) 140/94   07/08/19 (!) 173/103   05/07/19 124/70       Physical Exam    GEN: No apparent distress  HEENT: sclera non-icteric, conjunctiva clear  CV: no peripheral edema regular rate and rhythm  PULM: breathing non-labored  ABD: non, protuberant abdomen.  PSYCH: appropriate affect  MSK: able to rise from chair without assistance  Neurologic: tremor, bilateral hands.  1+ patellar reflexes bilaterally. No ankle clonus.  Normal muscle tone right lower extremity.  Left ankle with some tremor during manipulation.  - 5/5 flexion and extension from the knees downward bilaterally  SKIN: normal skin turgor    Pertinent Labs Reviewed       ASSESSMENT/PLAN:    Acute bilateral low back pain without sciatica.This is a New problem. The etiology is Unclear.  Suspect toxic/metabolic process.. The problem is improving. The risk of medical complications is moderate. Treatment/diagnostic recommendations are to modify the diagnostic/treatment plan as follows in addition to instructions noted on the After Visit Summary. The patient advised if symptoms change or intensify to seek medical care.   -     X-Ray Lumbar Spine Ap And Lateral; Future; Expected date: 07/09/2019  -     CK; Future; Expected date: 07/09/2019  -     Comprehensive metabolic  panel; Future; Expected date: 07/09/2019  -     Ambulatory referral to Neurology    Tremor of both hands.Etiology unclear. Not controlled. Further evaluation warranted.  Recommendations as below or as written on After Visit Summary.   -     Ambulatory referral to Neurology  -     RPR; Future; Expected date: 07/09/2019    Essential hypertension.Condition not optimally controlled. Detailed counseling on self care measures. Plan to monitor clinically in addition to plan below or as listed on After Visit Summary.   -     lisinopril-hydrochlorothiazide (PRINZIDE,ZESTORETIC) 20-12.5 mg per tablet; Take 1 tablet by mouth once daily. Further refills require visit with Dr. Dang  Dispense: 90 tablet; Refill: 0    Macrocytic anemia.Etiology unclear. Not controlled. Further evaluation warranted.  Recommendations as below or as written on After Visit Summary.   -     CBC auto differential; Future; Expected date: 07/09/2019  -     Vitamin B12; Future; Expected date: 07/09/2019  -     Folate; Future; Expected date: 07/09/2019      Future Appointments   Date Time Provider Department [place]   7/9/2019  4:45 PM LAB, [facility] [place]   7/10/2019 10:20 AM Wally Sahu MD [facility]   8/19/2019  3:40 PM Benny Dang MD [facility] [place]       Benny Dang  7/9/2019  4:31 PM

## 2019-07-10 ENCOUNTER — HOSPITAL ENCOUNTER (OUTPATIENT)
Dept: RADIOLOGY | Facility: HOSPITAL | Age: 39
Discharge: HOME | End: 2019-07-10
Attending: PSYCHIATRY & NEUROLOGY
Payer: COMMERCIAL

## 2019-07-10 ENCOUNTER — PATIENT MESSAGE (OUTPATIENT)
Dept: INTERNAL MEDICINE | Facility: CLINIC | Age: 39
End: 2019-07-10

## 2019-07-10 ENCOUNTER — PATIENT MESSAGE (OUTPATIENT)
Dept: NEUROLOGY | Facility: CLINIC | Age: 39
End: 2019-07-10

## 2019-07-10 ENCOUNTER — OFFICE VISIT (OUTPATIENT)
Dept: NEUROLOGY | Facility: CLINIC | Age: 39
End: 2019-07-10
Payer: COMMERCIAL

## 2019-07-10 VITALS
HEART RATE: 85 BPM | SYSTOLIC BLOOD PRESSURE: 148 MMHG | HEIGHT: 74 IN | DIASTOLIC BLOOD PRESSURE: 94 MMHG | WEIGHT: 185.44 LBS | BODY MASS INDEX: 23.8 KG/M2

## 2019-07-10 DIAGNOSIS — M54.50 LOW BACK PAIN, NON-SPECIFIC: ICD-10-CM

## 2019-07-10 DIAGNOSIS — M54.16 LUMBAR RADICULITIS: Primary | ICD-10-CM

## 2019-07-10 DIAGNOSIS — M51.26 LUMBAR HERNIATED DISC: Primary | ICD-10-CM

## 2019-07-10 PROCEDURE — 99204 OFFICE O/P NEW MOD 45 MIN: CPT | Mod: S$GLB,,, | Performed by: PSYCHIATRY & NEUROLOGY

## 2019-07-10 PROCEDURE — 72148 MRI LUMBAR SPINE WITHOUT CONTRAST: ICD-10-PCS | Mod: 26,,, | Performed by: RADIOLOGY

## 2019-07-10 PROCEDURE — 99999 PR PBB SHADOW E&M-EST. PATIENT-LVL IV: ICD-10-PCS | Mod: PBBFAC,,, | Performed by: PSYCHIATRY & NEUROLOGY

## 2019-07-10 PROCEDURE — 3008F PR BODY MASS INDEX (BMI) DOCUMENTED: ICD-10-PCS | Mod: CPTII,S$GLB,, | Performed by: PSYCHIATRY & NEUROLOGY

## 2019-07-10 PROCEDURE — 72148 MRI LUMBAR SPINE W/O DYE: CPT | Mod: 26,,, | Performed by: RADIOLOGY

## 2019-07-10 PROCEDURE — 99999 PR PBB SHADOW E&M-EST. PATIENT-LVL IV: CPT | Mod: PBBFAC,,, | Performed by: PSYCHIATRY & NEUROLOGY

## 2019-07-10 PROCEDURE — 3080F PR MOST RECENT DIASTOLIC BLOOD PRESSURE >= 90 MM HG: ICD-10-PCS | Mod: CPTII,S$GLB,, | Performed by: PSYCHIATRY & NEUROLOGY

## 2019-07-10 PROCEDURE — 99204 PR OFFICE/OUTPT VISIT, NEW, LEVL IV, 45-59 MIN: ICD-10-PCS | Mod: S$GLB,,, | Performed by: PSYCHIATRY & NEUROLOGY

## 2019-07-10 PROCEDURE — 3080F DIAST BP >= 90 MM HG: CPT | Mod: CPTII,S$GLB,, | Performed by: PSYCHIATRY & NEUROLOGY

## 2019-07-10 PROCEDURE — 3077F PR MOST RECENT SYSTOLIC BLOOD PRESSURE >= 140 MM HG: ICD-10-PCS | Mod: CPTII,S$GLB,, | Performed by: PSYCHIATRY & NEUROLOGY

## 2019-07-10 PROCEDURE — 72148 MRI LUMBAR SPINE W/O DYE: CPT | Mod: TC

## 2019-07-10 PROCEDURE — 3008F BODY MASS INDEX DOCD: CPT | Mod: CPTII,S$GLB,, | Performed by: PSYCHIATRY & NEUROLOGY

## 2019-07-10 PROCEDURE — 3077F SYST BP >= 140 MM HG: CPT | Mod: CPTII,S$GLB,, | Performed by: PSYCHIATRY & NEUROLOGY

## 2019-07-10 NOTE — LETTER
July 10, 2019      Benny Dang MD  [address] [facility] [facility] [address] [facility]  [place] [address]           [facility] Neurology  [address] [place] [address], [address]  [place] [address]  Phone: [phone]  Fax: [phone]          Patient: Erik Mims   MR Number: [id number]   YOB: 1980   Date of Visit: 7/10/2019       Dear Dr. Benny Dang:    Thank you for referring Erik Mims to me for evaluation. Attached you will find relevant portions of my assessment and plan of care.    If you have questions, please do not hesitate to call me. I look forward to following Erik Mims along with you.    Sincerely,    Wally Sahu MD    Enclosure  CC:  No Recipients    If you would like to receive this communication electronically, please contact [email or web address] or [phone] to request more information on [place] [employer] Link access.    For providers and/or their staff who would like to refer a patient to [facility], please contact us through our one-stop-shop provider referral line, [facility], at [phone].    If you feel you have received this communication in error or would no longer like to receive these types of communications, please e-mail [email or web address]

## 2019-07-10 NOTE — PROGRESS NOTES
[facility] NEUROLOGY  [facility]    Date: 7/10/19  Patient Name: Erik Mims   MRN: [id number]   PCP: Benny Dang  Referring Provider: Benny Dang*    Assessment:   Erik Mims is a 39 y.o. male presenting for evaluation of acute onset of low back pain. X-ray revealed disc space loss and patient has evidence of weakness on exam.  Obtaining stat MRI.  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  Further workup will be pending results.  Plan:     Problem List Items Addressed This Visit     None      Visit Diagnoses     Lumbar herniated disc    -  Primary    Relevant Orders    Ambulatory consult to Physical Therapy    Low back pain, non-specific        Relevant Orders    MRI Lumbar Spine Without Contrast          Wally Sahu MD  [facility]   Department of Neurology    Patient note was created using MModal Dictation.  Any errors in syntax or even information may not have been identified and edited on initial review prior to signing this note.  Subjective:   Patient seen in consultation at the request of Benny Dang* for the evaluation of acute back pain. A copy of this note will be sent to the referring physician.        HPI:   Mr. Erik Mims  presenting for evaluation of acute onset of back pain. The patient reports that on 07/08/2019, he was in his usual state of health when he coughed repeatedly.  He then experienced abrupt onset of severe low back pain radiating into his buttocks bilaterally. He states that his back instantly locked up and he was unable to move.  He had to lie on the floor and screw to reach a phone.  Friends ultimately helped him up and he presented to the emergency room where he underwent x-ray of his L-spine which revealed disc space loss at L5-S1.  He was given Motrin, Robaxin, and sent home.  He states these have not been helpful to control the  pain. Patient did not exhibit evidence of cauda equina.  He was able to ambulate however on exam today has notable lower extremity weakness and is experiencing paresthesias on his lateral thighs.      PAST MEDICAL HISTORY:  Past Medical History:   Diagnosis Date    COPD (chronic obstructive pulmonary disease)     Hypertension     Syncope     Tobacco use 7/20/2017       PAST SURGICAL HISTORY:  Past Surgical History:   Procedure Laterality Date    ESOPHAGOGASTRODUODENOSCOPY (EGD) N/A 11/3/2017    Performed by Jayme M[facility]quez Jr., MD at [facility] ENDO    LUNG SURGERY         CURRENT MEDS:  Current Outpatient Medications   Medication Sig Dispense Refill    DULoxetine (CYMBALTA) 60 MG capsule Take 1 capsule (60 mg total) by mouth once daily. Further refills require visit with Dr. Alton Sosa capsule 0    ibuprofen (ADVIL,MOTRIN) 800 MG tablet Take 1 tablet (800 mg total) by mouth every 6 (six) hours as needed for Pain. 20 tablet 0    ipratropium-albuterol (COMBIVENT)  mcg/actuation inhaler Inhale 1 puff into the lungs every 4 (four) hours as needed for Wheezing. Rescue 4 g 3    lisinopril-hydrochlorothiazide (PRINZIDE,ZESTORETIC) 20-12.5 mg per tablet Take 1 tablet by mouth once daily. Further refills require visit with Dr. Alton Sosa tablet 0    predniSONE (DELTASONE) 20 MG tablet Take 2 tablets (40 mg total) by mouth once daily. for 5 days 10 tablet 0    rOPINIRole (REQUIP) 0.25 MG tablet Take 1 tablet (0.25 mg total) by mouth nightly as needed. Further refills require visit with Dr. Alton Sosa tablet 0    tiotropium (SPIRIVA) 18 mcg inhalation capsule Inhale 1 capsule (18 mcg total) into the lungs once daily. Controller 90 capsule 0    aspirin (ECOTRIN) 81 MG EC tablet Take 1 tablet (81 mg total) by mouth once daily. 30 tablet 12    methocarbamol (ROBAXIN) 500 MG Tab Take 2 tablets (1,000 mg total) by mouth 3 (three) times daily. for 5 days 30 tablet 0    nitroGLYCERIN (NITROSTAT) 0.4 MG SL tablet  "Place 1 tablet (0.4 mg total) under the tongue every 5 (five) minutes as needed for Chest pain. 20 tablet 12    pantoprazole (PROTONIX) 40 MG tablet Take 1 tablet (40 mg total) by mouth daily as needed (gastritis). Further refills require visit with Dr. Dang 90 tablet 0     No current facility-administered medications for this visit.        ALLERGIES:  Review of patient's allergies indicates:  No Known Allergies    FAMILY HISTORY:  No family history on file.    SOCIAL HISTORY:  Social History     Tobacco Use    Smoking status: Current Every Day Smoker     Packs/day: 0.50    Smokeless tobacco: Never Used   Substance Use Topics    Alcohol use: Yes    Drug use: No       Review of Systems:  12 system review of systems is negative except for the symptoms mentioned in HPI.      Objective:     Vitals:    07/10/19 1024   BP: (!) 148/94   Pulse: 85   Weight: 84.1 kg (185 lb 6.5 oz)   Height: 6' 2" (1.88 m)     General: NAD, well nourished   Eyes: no tearing, discharge, no erythema   ENT: moist mucous membranes of the oral cavity, nares patent    Neck: Supple, full range of motion  Cardiovascular: Warm and well perfused, pulses equal and symmetrical  Lungs: Normal work of breathing, normal chest wall excursions  Skin: No rash, lesions, or breakdown on exposed skin  Psychiatry: Mood and affect are appropriate   Abdomen: soft, non tender, non distended  Extremeties: No cyanosis, clubbing or edema.    Neurological   MENTAL STATUS: Alert and oriented to person, place, and time. Attention and concentration within normal limits. Speech without dysarthria, able to name and repeat without difficulty. Recent and remote memory within normal limits   CRANIAL NERVES: Visual fields intact. PERRL. EOMI. Facial sensation intact. Face symmetrical. Hearing grossly intact. Full shoulder shrug bilaterally. Tongue protrudes midline   SENSORY: Sensation is intact to light touch throughout.    MOTOR: Normal bulk and tone. 5/5 deltoid, " biceps, triceps, interosseous, hand  bilaterally. 4/5 iliopsoas, knee extension/flexion, 5/5 foot dorsi/plantarflexion bilaterally.    REFLEXES: Symmetric and absent throughout LEs.   CEREBELLAR/COORDINATION/GAIT: Gait steady with normal arm swing and stride length.  Finger to nose intact. Normal rapid alternating movements.

## 2019-07-17 ENCOUNTER — OFFICE VISIT (OUTPATIENT)
Dept: PAIN MEDICINE | Facility: CLINIC | Age: 39
End: 2019-07-17
Attending: ANESTHESIOLOGY
Payer: COMMERCIAL

## 2019-07-17 VITALS
TEMPERATURE: 97 F | HEIGHT: 74 IN | SYSTOLIC BLOOD PRESSURE: 121 MMHG | HEART RATE: 91 BPM | DIASTOLIC BLOOD PRESSURE: 85 MMHG | WEIGHT: 184.75 LBS | BODY MASS INDEX: 23.71 KG/M2

## 2019-07-17 DIAGNOSIS — M47.816 OSTEOARTHRITIS OF LUMBAR SPINE, UNSPECIFIED SPINAL OSTEOARTHRITIS COMPLICATION STATUS: ICD-10-CM

## 2019-07-17 DIAGNOSIS — M62.838 MUSCLE SPASM: ICD-10-CM

## 2019-07-17 DIAGNOSIS — M54.16 LUMBAR RADICULOPATHY: ICD-10-CM

## 2019-07-17 DIAGNOSIS — M51.36 DDD (DEGENERATIVE DISC DISEASE), LUMBAR: Primary | ICD-10-CM

## 2019-07-17 PROCEDURE — 99244 OFF/OP CNSLTJ NEW/EST MOD 40: CPT | Mod: S$GLB,,, | Performed by: ANESTHESIOLOGY

## 2019-07-17 PROCEDURE — 99244 PR OFFICE CONSULTATION,LEVEL IV: ICD-10-PCS | Mod: S$GLB,,, | Performed by: ANESTHESIOLOGY

## 2019-07-17 PROCEDURE — 99999 PR PBB SHADOW E&M-EST. PATIENT-LVL III: CPT | Mod: PBBFAC,,, | Performed by: ANESTHESIOLOGY

## 2019-07-17 PROCEDURE — 99999 PR PBB SHADOW E&M-EST. PATIENT-LVL III: ICD-10-PCS | Mod: PBBFAC,,, | Performed by: ANESTHESIOLOGY

## 2019-07-17 RX ORDER — METHOCARBAMOL 750 MG/1
750 TABLET, FILM COATED ORAL 3 TIMES DAILY
Qty: 90 TABLET | Refills: 0 | Status: SHIPPED | OUTPATIENT
Start: 2019-07-17 | End: 2019-10-01 | Stop reason: SDUPTHER

## 2019-07-17 NOTE — PROGRESS NOTES
"Subjective:      Patient ID: Erik Mims is a 39 y.o. male.    Chief Complaint: Low-back Pain    Referred by: Wally Sahu MD     Pain Scales  Best: 2/10  Worst: 10/10  Usually: 4/10  Today: 3/10    Low-back Pain   Pertinent negatives include no bladder incontinence, chest pain, fever, numbness or paresthesias.   Patient is a 38 yo M with PMH COPD, SALMA, HTN, RLS, anxiety who acute on chronic low back pain starting 10 days ago. He has had chronic back pain with intermittent flares for the past year but experienced acute onset woresening after coughing of severe 10/10 low back pain with severe muscle spasms and a "locked up" sensation in his low back. No injury or inciting incident. He had radiation of pain into bilateral posterior thighs, but this is no longer occurring. Overall pain is improving but he continues to have limiting symptoms. He reports soreness currently in his low back with sharp pains intermittently with associated stabbing, throbbing, and stiffness sensations. Pain is worsened with coughing still. Currently denies radiation of pain into his legs. He denies numbness/tingling/weakness in his legs. Denies saddle anesthesia or bowel/bladder changes.     For pain he was taking ibuprofen 800mg TID and methocarbamol. He also takes cymbalta 60mg for anxiety for about a year. He was treated with 5 days of prednisone after an ER visit 7/8/19.     He has not tried tylenol, is generally averse to medication usage.     He has been referred to PT and first visit will be 7/20.     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     Interventional Pain History  none    Imaging:  L-spine xray 7/9/2019:  FINDINGS:  Mild lumbar scoliosis.  The lumbosacral disc space is narrowed.  The other disc spaces are well maintained.  No fracture, spondylolisthesis or bone destruction identified    L-spine MRI " 7/10/19:  FINDINGS:  Lumbar spine vertebral body heights and alignment are maintained.  No marrow replacing process.  Degenerative endplate changes and mild edema at L5-S1.  Spinal cord terminus lies at L1-L2.  Prevertebral and paraspinal soft tissues are unremarkable.    T12-L1: No significant posterior disc herniation, spinal canal stenosis, or neural foraminal impingement.    L1-L2: No significant posterior disc herniation, spinal canal stenosis, or neural foraminal impingement.    L2-L3: Flavum thickening with mild facet DJD.  No significant neural foraminal narrowing or spinal canal stenosis.    L3-L4: Flavum thickening with mild facet DJD.  No significant neural foraminal narrowing or spinal canal stenosis.    L4-L5: There is disc desiccation with circumferential bulge and superimposed central protrusion resulting in partial collapse of the anterior thecal sac.  This in combination with flavum thickening and facet DJD results in overall mild central canal stenosis with lateral recess narrowing and probable contact of the transiting left L5 nerve root.  No significant neural foraminal impingement.    L5-S1: There is disc desiccation with height loss and posterior annular fissure.  Circumferential bulge with small central protrusion without significant spinal canal stenosis or neural foraminal impingement.  Bilateral facet DJD with trace effusions.      Impression       Lower lumbar spondylosis resulting in mild central canal stenosis at L4-L5 with lateral recess narrowing and probable contact of the left transiting L5 nerve root.       Past Medical History:   Diagnosis Date    COPD (chronic obstructive pulmonary disease)     Hypertension     Syncope     Tobacco use 7/20/2017       Past Surgical History:   Procedure Laterality Date    ESOPHAGOGASTRODUODENOSCOPY (EGD) N/A 11/3/2017    Performed by Jayme M[facility]quez Jr., [facility] at [facility] ENDO    LUNG SURGERY         Review of patient's allergies indicates:  No  Known Allergies    Current Outpatient Medications   Medication Sig Dispense Refill    aspirin (ECOTRIN) 81 MG EC tablet Take 1 tablet (81 mg total) by mouth once daily. 30 tablet 12    DULoxetine (CYMBALTA) 60 MG capsule Take 1 capsule (60 mg total) by mouth once daily. Further refills require visit with Dr. Alton Sosa capsule 0    ibuprofen (ADVIL,MOTRIN) 800 MG tablet Take 1 tablet (800 mg total) by mouth every 6 (six) hours as needed for Pain. 20 tablet 0    ipratropium-albuterol (COMBIVENT)  mcg/actuation inhaler Inhale 1 puff into the lungs every 4 (four) hours as needed for Wheezing. Rescue 4 g 3    lisinopril-hydrochlorothiazide (PRINZIDE,ZESTORETIC) 20-12.5 mg per tablet Take 1 tablet by mouth once daily. Further refills require visit with Dr. Alton Sosa tablet 0    pantoprazole (PROTONIX) 40 MG tablet Take 1 tablet (40 mg total) by mouth daily as needed (gastritis). Further refills require visit with Dr. Alton Sosa tablet 0    rOPINIRole (REQUIP) 0.25 MG tablet Take 1 tablet (0.25 mg total) by mouth nightly as needed. Further refills require visit with Dr. Alton Sosa tablet 0    tiotropium (SPIRIVA) 18 mcg inhalation capsule Inhale 1 capsule (18 mcg total) into the lungs once daily. Controller 90 capsule 0    methocarbamol (ROBAXIN) 750 MG Tab Take 1 tablet (750 mg total) by mouth 3 (three) times daily. 90 tablet 0    nitroGLYCERIN (NITROSTAT) 0.4 MG SL tablet Place 1 tablet (0.4 mg total) under the tongue every 5 (five) minutes as needed for Chest pain. 20 tablet 12     No current facility-administered medications for this visit.        History reviewed. No pertinent family history.    Social History     Socioeconomic History    Marital status: Single     Spouse name: Not on file    Number of children: Not on file    Years of education: Not on file    Highest education level: Not on file   Occupational History    Not on file   Social Needs    Financial resource strain: Not on file     "Food insecurity:     Worry: Not on file     Inability: Not on file    Transportation needs:     Medical: Not on file     Non-medical: Not on file   Tobacco Use    Smoking status: Current Every Day Smoker     Packs/day: 0.50    Smokeless tobacco: Never Used   Substance and Sexual Activity    Alcohol use: Yes    Drug use: No    Sexual activity: Not on file   Lifestyle    Physical activity:     Days per week: Not on file     Minutes per session: Not on file    Stress: Not on file   Relationships    Social connections:     Talks on phone: Not on file     Gets together: Not on file     Attends Oriental orthodox service: Not on file     Active member of club or organization: Not on file     Attends meetings of clubs or organizations: Not on file     Relationship status: Not on file   Other Topics Concern    Not on file   Social History Narrative    Not on file           Review of Systems   Constitution: Negative for chills and fever.   HENT: Negative for sore throat.    Eyes: Negative for visual disturbance.   Cardiovascular: Negative for chest pain and palpitations.   Respiratory: Negative for cough.    Skin: Negative for rash.   Musculoskeletal: Positive for back pain and stiffness.   Gastrointestinal: Negative for constipation.   Genitourinary: Negative for bladder incontinence.   Neurological: Negative for focal weakness, numbness, paresthesias and sensory change.   Psychiatric/Behavioral: Negative for altered mental status.           Objective:   /85   Pulse 91   Temp 97 °F (36.1 °C)   Ht 6' 2" (1.88 m)   Wt 83.8 kg (184 lb 11.9 oz)   BMI 23.72 kg/m²   Pain Disability Index Review:  Last 3 PDI Scores 7/17/2019   Pain Disability Index (PDI) 27     Normocephalic.  Atraumatic.  Affect appropriate.  Breathing unlabored.  Extra ocular muscles intact.               General Musculoskeletal Exam   Gait: normal     Right Ankle/Foot Exam     Tests   Heel Walk: able to perform  Tiptoe Walk: able to perform    Left " Ankle/Foot Exam     Tests   Heel Walk: able to perform  Tiptoe Walk: able to perform      Right Hip Exam     Tenderness   The patient tender to palpation of the SI joint.    Range of Motion   External rotation: normal   Internal rotation: normal     Tests   Pain w/ forced internal rotation (RADHA): present  Mirza: negative    Other   Sensation: normal  Left Hip Exam     Range of Motion   External rotation: normal   Internal rotation: normal     Tests   Pain w/ forced internal rotation (RADHA): absent  Mirza: negative    Other   Sensation: normal      Back (L-Spine & T-Spine) / Neck (C-Spine) Exam     Tenderness Right paramedian tenderness of the Lower L-Spine. Left paramedian tenderness of the Lower L-Spine.     Back (L-Spine & T-Spine) Range of Motion   Extension: normal   Flexion: normal     Spinal Sensation   Right Side Sensation  L-Spine Level: normal  S-Spine Level: normal  Left Side Sensation  L-Spine Level: normal  S-Spine Level: normal    Back (L-Spine & T-Spine) Tests   Right Side Tests  Femoral Stretch: negative  Left Side Tests  Femoral Stretch: negative    Comments:  FACET LOADING: positive Bilateral    Milgram's test positive for reproduction of back pain.        Muscle Strength   Right Lower Extremity   Hip Flexion: 5/5   Quadriceps:  5/5   Hamstrin/5   Gastrocsoleus:  5/5/5  EHL:  4/5  Left Lower Extremity   Hip Flexion: 5/5   Quadriceps:  5/5   Hamstrin/5   Gastrocsoleus:  5/5/5  EHL:  4/5    Reflexes     Left Side  Quadriceps:  2+  Achilles:  2+  Left Jefferson's Sign:  Absent  Babinski Sign:  absent  Ankle Clonus:  absent    Right Side   Quadriceps:  2+  Achilles:  2+  Right Jefferson's Sign:  absent  Babinski Sign:  absent  Ankle Clonus:  absent    Vascular Exam     Right Pulses  Dorsalis Pedis:      2+          Left Pulses  Dorsalis Pedis:      2+          Capillary Refill  Right Hand: normal capillary refill  Left Hand: normal capillary refill    Edema  Right Upper Leg: absent  Left Upper  Leg: absent        Assessment:       Encounter Diagnoses   Name Primary?    DDD (degenerative disc disease), lumbar Yes    Lumbar radiculopathy     Osteoarthritis of lumbar spine, unspecified spinal osteoarthritis complication status     Muscle spasm          Plan:   We discussed with the patient the assessment and recommendations. The following is the plan we agreed on:    1. We discussed the natural history of lumbar radiculopathy due to disc herniation, most patients improve with time.     2. Agree with PT for improvement in symptoms. Patient to start 7/20.     3. If pain limits participation in therapy or does not improve, recommend bilateral L4 TFESI. Patient to call after initiating therapy and this can be scheduled if poor response to therapy and medication.    4. Patient has gotten good relief of pain without sedation with methocarbamol. Will provide refill, with 750mg TID PRN #90.     5. Discussed using traction device for symptomatic pain improvement.    6. Followup 4 weeks to assess response to therapy and medication.    7. In the future, if discogenic/radicular pain improves but axial facet-type pain remains, we can consider facet joint interventions.       Erik was seen today for low-back pain.    Diagnoses and all orders for this visit:    DDD (degenerative disc disease), lumbar  -     Ambulatory consult to Physical Therapy    Lumbar radiculopathy    Osteoarthritis of lumbar spine, unspecified spinal osteoarthritis complication status  -     Ambulatory consult to Physical Therapy    Muscle spasm  -     Ambulatory consult to Physical Therapy    Other orders  -     methocarbamol (ROBAXIN) 750 MG Tab; Take 1 tablet (750 mg total) by mouth 3 (three) times daily.      LUC Alaniz MD  [facility] Fellow  I have personally taken the history and examined this patient and agree with the fellow's note as stated above.

## 2019-07-17 NOTE — LETTER
July 17, 2019      Wally Sahu MD  [address]  [address]  [place] [address]           [facility] PainMgmt [facility] [address] [facility] [address]  [address] [facility] [address]  Phone: [phone]  Fax: [phone]          Patient: Erik Mims   MR Number: [id number]   YOB: 1980   Date of Visit: 7/17/2019       Dear Dr. Wally Sahu:    Thank you for referring Erik Mims to me for evaluation. Attached you will find relevant portions of my assessment and plan of care.    If you have questions, please do not hesitate to call me. I look forward to following Erik Mims along with you.    Sincerely,    Farrah Leon MD    Enclosure  CC:  No Recipients    If you would like to receive this communication electronically, please contact [email or web address] or [phone] to request more information on [employer] Link access.    For providers and/or their staff who would like to refer a patient to [facility], please contact us through our one-stop-shop provider referral line, [facility], at [phone].    If you feel you have received this communication in error or would no longer like to receive these types of communications, please e-mail [email or web address]

## 2019-07-29 ENCOUNTER — CLINICAL SUPPORT (OUTPATIENT)
Dept: REHABILITATION | Facility: HOSPITAL | Age: 39
End: 2019-07-29
Attending: PSYCHIATRY & NEUROLOGY
Payer: COMMERCIAL

## 2019-07-29 DIAGNOSIS — M54.40 ACUTE BILATERAL LOW BACK PAIN WITH SCIATICA, SCIATICA LATERALITY UNSPECIFIED: ICD-10-CM

## 2019-07-29 PROCEDURE — 97110 THERAPEUTIC EXERCISES: CPT | Mod: PN

## 2019-07-29 PROCEDURE — 97161 PT EVAL LOW COMPLEX 20 MIN: CPT | Mod: PN

## 2019-07-29 NOTE — PLAN OF CARE
[facility]  Physical Therapy Initial Evaluation    Name: Erik Mims  Clinic Number: [id number]    Therapy Diagnosis:   Encounter Diagnosis   Name Primary?    Acute bilateral low back pain with sciatica, sciatica laterality unspecified      Physician: Wally Sahu MD    Physician Orders: PT Eval and Treat   Medical Diagnosis: M51.26 (ICD-10-CM) - Lumbar herniated disc  Evaluation Date: 7/29/2019  Authorization Period Expiration:  12/31/2019  Plan of Care Certification Period: 7/29/2019 to 10/04/2019  Visit # / Visits authorized: 1/ 12    Time In: 1745  Time Out: 1835  Total Billable Time: 50 minutes (1 LCE, 1 TE)  Precautions: Standard      Subjective   Erik reports an acute episode of back pain 3 weeks ago.  States that he woke up, sat up in the bed, coughed 2-3 times and instantly his back locked up causing him to fall to the floor.  He had to have an ambulance called to his home.  He was taken to the ED.    After several days his pain lessened and he was able to walk. Now with continue low-grade LBP with intermittent tingling behind his LLE. Daily occurrence but none at present. Sleeps on his back.        Past Medical History:   Diagnosis Date    COPD (chronic obstructive pulmonary disease)     Hypertension     Syncope     Tobacco use 7/20/2017     Erik Mims  has a past surgical history that includes Lung surgery.    Erik has a current medication list which includes the following prescription(s): aspirin, duloxetine, ibuprofen, ipratropium-albuterol, lisinopril-hydrochlorothiazide, methocarbamol, nitroglycerin, pantoprazole, ropinirole, and tiotropium.    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  Job requires climbing stairs and ladders attached to safety harness and  "working on pipes.    Prior Level of Function: no prior history of low back pain.   Current Level of Function: he is now on light duty at work. He is not cleared to return to his job.     Pain:  Current 5/10, worst 8/10, best 3/10   Location: bilateral back  and buttocks   Description: Burning and Deep  Aggravating Factors: Standing, Bending, Walking and Morning  Easing Factors: rest, medications    Pts goals: to return to his job; 'If I don't work, I don't get paid."    Objective     Palpation: TTP along bilateral lumbar paraspinals  Posture:  Tall male with high waist line.     Gross movement analysis:  -Gait: non-antalgic gait pattern  -Forward bending: increased thoracolumbar flexion with limited hip flexion; pain  -Squat: normal depth achieved    Lumbar Range of Motion:    Degrees Pain   Flexion WNL Pain   Extension 25% limitation Pain lumbosacral junction    Left Side Bending 25% limitation Pain R side    Right Side Bending 25% limitation Pain L side   Left rotation   25% limitation Pain L side   Right Rotation   25% limitation Pain R side      Range of Motion:   LE Right Left   Hip flexion WNL WNL   Hip abduction WNL WNL   Hip ER WNL WNL   Hip IR  WNL WNL   Hip extension WNL WNL   Knee WNL WNL   Ankle DF WNL WNL     Lower Extremity Strength   Right Left   Hip flexion: 5/5 5/5   Hip extension: 5/5 5/5   Hip abduction: 5/5 5/5   Hip ER:  5/5 5/5   Knee extension: 5/5 5/5   Knee flexion: 5/5 5/5   Ankle dorsiflexion: 5/5 5/5   Great toe extension: 5/5 5/5     Special Tests:  -Repeated Flexion: not tested  -Repeated Ext: negative for peripheralization  -Piriformis Test: negative B  -SLR neural tension test: (+) LLE   -Lumbar quadrant test: pain B but (-) peripheralization  - trunk extensor MMT: 3/5    DTR:   Right Left   Patellar (L3-4) 2+ 2+   Achilles (S1) 2+ 2+     Joint Mobility: mild lower lumbar hypomobility with segmental PA testing  Sensation: Intact light touch sensation to BLE through L2-S2 dermatomal " distribution    Flexibility:    Ely's test: mild stiffness B   Popliteal Angle:<30 degrees B   Wilbur test: mild stiffness B   P/SLR test: <60 degrees B      CMS Impairment/Limitation/Restriction for FOTO Lumbar Survey    Therapist reviewed FOTO scores for Erik Mims on 7/29/2019.   FOTO documents entered into [facility] - see Media section.    Limitation Score: 57%  Predicted Limitation Score: 33%       TREATMENT   Treatment Time In: 1815  Treatment Time Out: 1835  Total Treatment time separate from Evaluation time: 20 minutes     Erik received therapeutic exercises to develop strength, endurance, ROM, flexibility, posture and core stabilization for 20 minutes including:  - bridges 1x10, TAs 1x10, prone on elbow 2', prone press-ups 2x10, prone hip extension 1x10      Home Exercises and Patient Education Provided  Education provided re:   - PT prognosis and recommended treatment course.     Written Home Exercises Provided: yes.  Exercises were reviewed and Erik was able to demonstrate them prior to the end of the session.   Pt received a written copy of exercises to perform at home. Erik demonstrated good  understanding of the education provided.     See EMR under patient instructions for exercises given 7/29/2019.    Assessment   Erik is a 39 y.o. male referred to outpatient Physical Therapy with a medical diagnosis of lumbar herniated disc. Pt presents with improving low back pain that required him to be ambulanced to the emergency room.  He continues to have low-to-moderate bilateral lumbar pain with daily intermittent LLE posterior radicular symptoms.  He is eager to return to work although he works a physically demanding job. Will start on an extension based program with grade abdominal strengthening.     Pt prognosis is Excellent.   Pt will benefit from skilled outpatient Physical Therapy to address the deficits stated above and in the chart below, provide pt/family education, and to maximize  pt's level of independence.     Plan of care discussed with patient: Yes  Pt's spiritual, cultural and educational needs considered and patient is agreeable to the plan of care and goals as stated below:     Anticipated Barriers for therapy: Secondary gain issue; neurologist told him had to complete PT before he could get injections in his back.    Medical Necessity is demonstrated by the following  History  Co-morbidities and personal factors that may impact the plan of care Co-morbidities:   Anxiety or Panic Disorders, Back pain, Chronic Obstructive Pulmonary Disease, or  emphysema, High Blood Pressure    Personal Factors:   lifestyle     moderate   Examination  Body Structures and Functions, activity limitations and participation restrictions that may impact the plan of care Body Regions:   back  lower extremities  trunk    Body Systems:    ROM  strength  transfers  transitions  motor control  edema    Participation Restrictions:   See above    Activity limitations:   Learning and applying knowledge  no deficits    General Tasks and Commands  no deficits    Communication  no deficits    Mobility  lifting and carrying objects  walking    Self care  no deficits    Domestic Life  doing house work (cleaning house, washing dishes, laundry)    Interactions/Relationships  no deficits    Life Areas  employment    Community and Social Life  community life  recreation and leisure         moderate   Clinical Presentation stable and uncomplicated low   Decision Making/ Complexity Score: low     Goals:  Short Term Goals: 3-4 weeks:  1.  Patient will demonstrate log roll technique with supine-to-sit with cueing for lumbar spine protection.   2.  Patient will demonstrate 4/5 trunk extensor MMT.   3.  Patient will report <4/10 lumbar pain at worst throughout his day.     Long Term Goals: 8 - 12 weeks   1. Patient will report no LLE radicular pain consistently throughout his day.   2. Patient will demonstrate stair climbing x 50  stairs without increased low back pain.     Plan   Certification Period/Plan of care expiration: 7/29/2019 to 10/4/2019.    Outpatient Physical Therapy 2 times weekly for 10 weeks to include the following interventions: Cervical/Lumbar Traction, Electrical Stimulation IFC, Manual Therapy, Moist Heat/ Ice, Neuromuscular Re-ed, Patient Education, Self Care, Therapeutic Activites and Therapeutic Exercise, Dry Needling, IASTM.     Jaime Barbour, PT

## 2019-07-31 PROBLEM — M54.40 ACUTE BILATERAL LOW BACK PAIN WITH SCIATICA: Status: ACTIVE | Noted: 2019-07-31

## 2019-08-01 ENCOUNTER — CLINICAL SUPPORT (OUTPATIENT)
Dept: REHABILITATION | Facility: HOSPITAL | Age: 39
End: 2019-08-01
Payer: COMMERCIAL

## 2019-08-01 DIAGNOSIS — M54.40 ACUTE BILATERAL LOW BACK PAIN WITH SCIATICA, SCIATICA LATERALITY UNSPECIFIED: ICD-10-CM

## 2019-08-01 PROCEDURE — 97140 MANUAL THERAPY 1/> REGIONS: CPT | Mod: PN

## 2019-08-01 PROCEDURE — 97110 THERAPEUTIC EXERCISES: CPT | Mod: PN

## 2019-08-01 NOTE — PROGRESS NOTES
"  Physical Therapy Daily Treatment Note     Name: Eirk Mims  Clinic Number: [id number]    Therapy Diagnosis:   Encounter Diagnosis   Name Primary?    Acute bilateral low back pain with sciatica, sciatica laterality unspecified      Physician: Wally Sahu MD    Visit Date: 8/1/2019    Physician Orders: PT Eval and Treat   Medical Diagnosis: M51.26 (ICD-10-CM) - Lumbar herniated disc  Evaluation Date: 7/29/2019  Authorization Period Expiration:  12/31/2019  Plan of Care Certification Period: 7/29/2019 to 10/04/2019  Visit # / Visits authorized: 2/ 12    Time In: 1721  Time Out: 1821  Total Billable Time: 60 minutes (MT-1, TE-3)    Precautions: Standard    Subjective     Pt reports: he is feeling okay today.    Response to previous treatment: no adverse reaction from treatment at initial evaluation  Functional change: no change    Pain: 1/10  Location: bilateral back  and buttocks      Objective     Erik received therapeutic exercises to develop strength, endurance, ROM, flexibility, posture and core stabilization for 50 minutes including:    bridges 2x10  TAs 2x10  prone on elbow 2'   prone press-ups 2x10  prone hip extension 2x10  Dying bug 3x10  Sidelying hip ABduction 3x10 B  Leg press 7 plates 3x10  Hamstring stretch 5x30" B  Quadriceps stretch 5x30" B    Erik received the following manual therapy techniques: Joint mobilizations were applied to the: lumbar spine for 10 minutes, including:    Grade III-IV central and B unilateral posterior to anterior joint mobilizations L1-5        Home Exercises Provided and Patient Education Provided     Education provided:   - be aware of posture    Written Home Exercises Provided: give next.   Erik demonstrated good  understanding of the education provided.         Assessment     Patient tolerated treatment well with no complaints of increased pain or discomfort. Hypomobility throughout lumbar spine noted during mobilizations    Erik is progressing " well towards his goals.   Pt prognosis is Good.     Pt will continue to benefit from skilled outpatient physical therapy to address the deficits listed in the problem list box on initial evaluation, provide pt/family education and to maximize pt's level of independence in the home and community environment.     Pt's spiritual, cultural and educational needs considered and pt agreeable to plan of care and goals.     Anticipated barriers to physical therapy: Secondary gain issue; neurologist told him had to complete PT before he could get injections in his back.    Goals:     Short Term Goals: 3-4 weeks:  1.  Patient will demonstrate log roll technique with supine-to-sit with cueing for lumbar spine protection. PROGRESSING, NOT MET 8/1/2019  2.  Patient will demonstrate 4/5 trunk extensor MMT. PROGRESSING, NOT MET 8/1/2019  3.  Patient will report <4/10 lumbar pain at worst throughout his day. PROGRESSING, NOT MET 8/1/2019     Long Term Goals: 8 - 12 weeks   1. Patient will report no LLE radicular pain consistently throughout his day. PROGRESSING, NOT MET 8/1/2019  2. Patient will demonstrate stair climbing x 50 stairs without increased low back pain. PROGRESSING, NOT MET 8/1/2019         Plan     Continue plan of care.      Riddhi Sherwood, PT

## 2019-08-12 ENCOUNTER — PATIENT OUTREACH (OUTPATIENT)
Dept: ADMINISTRATIVE | Facility: OTHER | Age: 39
End: 2019-08-12

## 2019-08-14 ENCOUNTER — OFFICE VISIT (OUTPATIENT)
Dept: PAIN MEDICINE | Facility: CLINIC | Age: 39
End: 2019-08-14
Payer: COMMERCIAL

## 2019-08-14 ENCOUNTER — PATIENT MESSAGE (OUTPATIENT)
Dept: NEUROLOGY | Facility: CLINIC | Age: 39
End: 2019-08-14

## 2019-08-14 VITALS
HEIGHT: 74 IN | SYSTOLIC BLOOD PRESSURE: 126 MMHG | HEART RATE: 86 BPM | BODY MASS INDEX: 23.45 KG/M2 | DIASTOLIC BLOOD PRESSURE: 88 MMHG | TEMPERATURE: 98 F | WEIGHT: 182.75 LBS | RESPIRATION RATE: 18 BRPM

## 2019-08-14 DIAGNOSIS — M47.816 OSTEOARTHRITIS OF LUMBAR SPINE, UNSPECIFIED SPINAL OSTEOARTHRITIS COMPLICATION STATUS: ICD-10-CM

## 2019-08-14 DIAGNOSIS — M62.838 MUSCLE SPASM: ICD-10-CM

## 2019-08-14 DIAGNOSIS — M54.16 LUMBAR RADICULOPATHY: Primary | ICD-10-CM

## 2019-08-14 DIAGNOSIS — M51.36 DDD (DEGENERATIVE DISC DISEASE), LUMBAR: ICD-10-CM

## 2019-08-14 PROCEDURE — 3079F PR MOST RECENT DIASTOLIC BLOOD PRESSURE 80-89 MM HG: ICD-10-PCS | Mod: CPTII,S$GLB,, | Performed by: NURSE PRACTITIONER

## 2019-08-14 PROCEDURE — 99999 PR PBB SHADOW E&M-EST. PATIENT-LVL III: CPT | Mod: PBBFAC,,, | Performed by: NURSE PRACTITIONER

## 2019-08-14 PROCEDURE — 3074F PR MOST RECENT SYSTOLIC BLOOD PRESSURE < 130 MM HG: ICD-10-PCS | Mod: CPTII,S$GLB,, | Performed by: NURSE PRACTITIONER

## 2019-08-14 PROCEDURE — 99213 OFFICE O/P EST LOW 20 MIN: CPT | Mod: S$GLB,,, | Performed by: NURSE PRACTITIONER

## 2019-08-14 PROCEDURE — 99999 PR PBB SHADOW E&M-EST. PATIENT-LVL III: ICD-10-PCS | Mod: PBBFAC,,, | Performed by: NURSE PRACTITIONER

## 2019-08-14 PROCEDURE — 3079F DIAST BP 80-89 MM HG: CPT | Mod: CPTII,S$GLB,, | Performed by: NURSE PRACTITIONER

## 2019-08-14 PROCEDURE — 3074F SYST BP LT 130 MM HG: CPT | Mod: CPTII,S$GLB,, | Performed by: NURSE PRACTITIONER

## 2019-08-14 PROCEDURE — 99213 PR OFFICE/OUTPT VISIT, EST, LEVL III, 20-29 MIN: ICD-10-PCS | Mod: S$GLB,,, | Performed by: NURSE PRACTITIONER

## 2019-08-14 PROCEDURE — 3008F PR BODY MASS INDEX (BMI) DOCUMENTED: ICD-10-PCS | Mod: CPTII,S$GLB,, | Performed by: NURSE PRACTITIONER

## 2019-08-14 PROCEDURE — 3008F BODY MASS INDEX DOCD: CPT | Mod: CPTII,S$GLB,, | Performed by: NURSE PRACTITIONER

## 2019-08-14 NOTE — PROGRESS NOTES
Chronic patient Established Note (Follow up visit)      SUBJECTIVE:    Erik Mims presents to the clinic for a follow-up appointment for low back pain. He reports that his low back and leg pain is much improved since last visit. He reports intermittent low back pain that is aching in nature. He reports intermittent radiating pain down the posterior aspect of his thighs. He has completed 2 physical therapy sessions at this time. He continues to take Robaxin as needed. He denies any other health changes. He denies any bowel or bladder incontinence. Since the last visit, Erik Mims states the pain has been improving. Current pain intensity is 3/10.    Pain Disability Index Review:  Last 3 PDI Scores 8/14/2019 7/17/2019   Pain Disability Index (PDI) 19 27       Pain Medications:  Robaxin    Opioid Contract: no     report:  Reviewed and consistent with medication use as prescribed.    Pain Procedures:   None    Physical Therapy/Home Exercise: yes, currently enrolled    Imaging:   MRI Lumbar Spine 7/10/2019:  COMPARISON:  Lumbar spine radiograph dated 07/09/2019    FINDINGS:  Lumbar spine vertebral body heights and alignment are maintained.  No marrow replacing process.  Degenerative endplate changes and mild edema at L5-S1.  Spinal cord terminus lies at L1-L2.  Prevertebral and paraspinal soft tissues are unremarkable.    T12-L1: No significant posterior disc herniation, spinal canal stenosis, or neural foraminal impingement.    L1-L2: No significant posterior disc herniation, spinal canal stenosis, or neural foraminal impingement.    L2-L3: Flavum thickening with mild facet DJD.  No significant neural foraminal narrowing or spinal canal stenosis.    L3-L4: Flavum thickening with mild facet DJD.  No significant neural foraminal narrowing or spinal canal stenosis.    L4-L5: There is disc desiccation with circumferential bulge and superimposed central protrusion resulting in partial collapse of  the anterior thecal sac.  This in combination with flavum thickening and facet DJD results in overall mild central canal stenosis with lateral recess narrowing and probable contact of the transiting left L5 nerve root.  No significant neural foraminal impingement.    L5-S1: There is disc desiccation with height loss and posterior annular fissure.  Circumferential bulge with small central protrusion without significant spinal canal stenosis or neural foraminal impingement.  Bilateral facet DJD with trace effusions.      Impression       Lower lumbar spondylosis resulting in mild central canal stenosis at L4-L5 with lateral recess narrowing and probable contact of the left transiting L5 nerve root.    Trace bilateral facet effusions at L5-S1.     Xray Lumbar Spine 7/9/2019:  FINDINGS:  Mild lumbar scoliosis.  The lumbosacral disc space is narrowed.  The other disc spaces are well maintained.  No fracture, spondylolisthesis or bone destruction identified      Impression       See above         Allergies: Review of patient's allergies indicates:  No Known Allergies    Current Medications:   Current Outpatient Medications   Medication Sig Dispense Refill    DULoxetine (CYMBALTA) 60 MG capsule Take 1 capsule (60 mg total) by mouth once daily. Further refills require visit with Dr. Alton Sosa capsule 0    ipratropium-albuterol (COMBIVENT)  mcg/actuation inhaler Inhale 1 puff into the lungs every 4 (four) hours as needed for Wheezing. Rescue 4 g 3    lisinopril-hydrochlorothiazide (PRINZIDE,ZESTORETIC) 20-12.5 mg per tablet Take 1 tablet by mouth once daily. Further refills require visit with Dr. Alton Sosa tablet 0    methocarbamol (ROBAXIN) 750 MG Tab Take 1 tablet (750 mg total) by mouth 3 (three) times daily. 90 tablet 0    rOPINIRole (REQUIP) 0.25 MG tablet Take 1 tablet (0.25 mg total) by mouth nightly as needed. Further refills require visit with Dr. Alton Sosa tablet 0    tiotropium (SPIRIVA) 18 mcg  inhalation capsule Inhale 1 capsule (18 mcg total) into the lungs once daily. Controller 90 capsule 0    nitroGLYCERIN (NITROSTAT) 0.4 MG SL tablet Place 1 tablet (0.4 mg total) under the tongue every 5 (five) minutes as needed for Chest pain. 20 tablet 12     No current facility-administered medications for this visit.        REVIEW OF SYSTEMS:    GENERAL:  No weight loss, malaise or fevers.  HEENT:  Negative for frequent or significant headaches.  NECK:  Negative for lumps, goiter, pain and significant neck swelling.  RESPIRATORY:  Negative for cough, wheezing or shortness of breath. COPD  CARDIOVASCULAR:  Negative for chest pain, leg swelling or palpitations. HTN  GI:  Negative for abdominal discomfort, blood in stools or black stools or change in bowel habits.  MUSCULOSKELETAL:  See HPI.  SKIN:  Negative for lesions, rash, and itching.  PSYCH:  Negative for sleep disturbance, mood disorder and recent psychosocial stressors.  HEMATOLOGY/LYMPHOLOGY:  Negative for prolonged bleeding, bruising easily or swollen nodes.  NEURO:   No history of headaches, syncope, paralysis, seizures or tremors.  All other reviewed and negative other than HPI.    Past Medical History:  Past Medical History:   Diagnosis Date    COPD (chronic obstructive pulmonary disease)     Hypertension     Syncope     Tobacco use 7/20/2017       Past Surgical History:  Past Surgical History:   Procedure Laterality Date    ESOPHAGOGASTRODUODENOSCOPY (EGD) N/A 11/3/2017    Performed by Jayme Márquez Jr., MD at [facility]    LUNG SURGERY         Family History:  History reviewed. No pertinent family history.    Social History:  Social History     Socioeconomic History    Marital status: Single     Spouse name: Not on file    Number of children: Not on file    Years of education: Not on file    Highest education level: Not on file   Occupational History    Not on file   Social Needs    Financial resource strain: Not on file    Food  "insecurity:     Worry: Not on file     Inability: Not on file    Transportation needs:     Medical: Not on file     Non-medical: Not on file   Tobacco Use    Smoking status: Current Every Day Smoker     Packs/day: 0.50    Smokeless tobacco: Never Used   Substance and Sexual Activity    Alcohol use: Yes    Drug use: No    Sexual activity: Not on file   Lifestyle    Physical activity:     Days per week: Not on file     Minutes per session: Not on file    Stress: Not on file   Relationships    Social connections:     Talks on phone: Not on file     Gets together: Not on file     Attends Restorationism service: Not on file     Active member of club or organization: Not on file     Attends meetings of clubs or organizations: Not on file     Relationship status: Not on file   Other Topics Concern    Not on file   Social History Narrative    Not on file       OBJECTIVE:    /88   Pulse 86   Temp 98.3 °F (36.8 °C)   Resp 18   Ht 6' 2" (1.88 m)   Wt 82.9 kg (182 lb 12.2 oz)   BMI 23.47 kg/m²     PHYSICAL EXAMINATION:    General appearance: Well appearing, in no acute distress, alert and oriented x3.  Psych:  Mood and affect appropriate.  Skin: Skin color, texture, turgor normal, no rashes or lesions, in both upper and lower body.  Head/face:  Atraumatic, normocephalic. No palpable lymph nodes  Cor: RRR  Pulm: CTA  GI: Abdomen soft and non-tender.  Back: Straight leg raising in the sitting position is negative to radicular pain bilaterally. Mild pain to palpation over the lumbar paraspinals and lumbar spine. Full ROM without pain. Mildly positive facet loading bilaterally.   Extremities: Peripheral joint ROM is full and pain free without obvious instability or laxity in all four extremities. No deformities, edema, or skin discoloration. Good capillary refill.  Musculoskeletal: Shoulder, hip, sacroiliac and knee provocative maneuvers are negative. Bilateral upper and lower extremity strength is normal and " symmetric.  No atrophy or tone abnormalities are noted.  Neuro: Bilateral upper and lower extremity coordination and muscle stretch reflexes are physiologic and symmetric.  Plantar response are downgoing. No loss of sensation is noted.  Gait: Normal.    ASSESSMENT: 39 y.o. year old male with low back pain, consistent with the followin. Lumbar radiculopathy     2. Osteoarthritis of lumbar spine, unspecified spinal osteoarthritis complication status     3. DDD (degenerative disc disease), lumbar     4. Muscle spasm           PLAN:     - Previous imaging was reviewed and discussed with the patient today.    - In the future, should his radicular pain return, we could consider bilateral L4 TF ISABELLA.     - He may also benefit from facet joint injections.     - Given that his pain is much improved, we will wait to pursue interventions at this time.     - Continue physical therapy to progress to a home exercise routine.     - Continue Robaxin PRN.     - RTC PRN.     - Counseled patient regarding the importance of activity modification, constant sleeping habits and physical therapy.    - Dr. Leon was consulted on the patient and agrees with this plan.    The above plan and management options were discussed at length with patient. Patient is in agreement with the above and verbalized understanding.    Regina Leija NP  2019

## 2019-10-01 ENCOUNTER — HOSPITAL ENCOUNTER (OUTPATIENT)
Facility: HOSPITAL | Age: 39
LOS: 1 days | Discharge: HOME | End: 2019-10-02
Attending: EMERGENCY MEDICINE | Admitting: STUDENT IN AN ORGANIZED HEALTH CARE EDUCATION/TRAINING PROGRAM
Payer: COMMERCIAL

## 2019-10-01 ENCOUNTER — OFFICE VISIT (OUTPATIENT)
Dept: URGENT CARE | Facility: CLINIC | Age: 39
End: 2019-10-01
Payer: COMMERCIAL

## 2019-10-01 VITALS
HEIGHT: 74 IN | DIASTOLIC BLOOD PRESSURE: 112 MMHG | RESPIRATION RATE: 18 BRPM | SYSTOLIC BLOOD PRESSURE: 185 MMHG | HEART RATE: 78 BPM | OXYGEN SATURATION: 96 % | BODY MASS INDEX: 23.36 KG/M2 | WEIGHT: 182 LBS | TEMPERATURE: 98 F

## 2019-10-01 DIAGNOSIS — R10.11 RUQ ABDOMINAL PAIN: ICD-10-CM

## 2019-10-01 DIAGNOSIS — K81.1 CHRONIC CHOLECYSTITIS: ICD-10-CM

## 2019-10-01 DIAGNOSIS — R10.11 RIGHT UPPER QUADRANT ABDOMINAL PAIN: Primary | ICD-10-CM

## 2019-10-01 DIAGNOSIS — K80.70 CHOLELITHIASIS WITH CHOLEDOCHOLITHIASIS: Primary | ICD-10-CM

## 2019-10-01 DIAGNOSIS — K80.20 SYMPTOMATIC CHOLELITHIASIS: ICD-10-CM

## 2019-10-01 LAB
BASOPHILS # BLD AUTO: 0.05 K/UL (ref 0–0.2)
BASOPHILS NFR BLD: 0.6 % (ref 0–1.9)
BILIRUB UR QL STRIP: NEGATIVE
CLARITY UR: ABNORMAL
COLOR UR: YELLOW
DIFFERENTIAL METHOD: ABNORMAL
EOSINOPHIL # BLD AUTO: 0 K/UL (ref 0–0.5)
EOSINOPHIL NFR BLD: 0.2 % (ref 0–8)
ERYTHROCYTE [DISTWIDTH] IN BLOOD BY AUTOMATED COUNT: 12.1 % (ref 11.5–14.5)
GLUCOSE UR QL STRIP: NEGATIVE
HCT VFR BLD AUTO: 43.8 % (ref 40–54)
HGB BLD-MCNC: 15.1 G/DL (ref 14–18)
HGB UR QL STRIP: NEGATIVE
KETONES UR QL STRIP: NEGATIVE
LEUKOCYTE ESTERASE UR QL STRIP: NEGATIVE
LYMPHOCYTES # BLD AUTO: 1.2 K/UL (ref 1–4.8)
LYMPHOCYTES NFR BLD: 14.6 % (ref 18–48)
MCH RBC QN AUTO: 36 PG (ref 27–31)
MCHC RBC AUTO-ENTMCNC: 34.5 G/DL (ref 32–36)
MCV RBC AUTO: 104 FL (ref 82–98)
MONOCYTES # BLD AUTO: 0.7 K/UL (ref 0.3–1)
MONOCYTES NFR BLD: 8.6 % (ref 4–15)
NEUTROPHILS # BLD AUTO: 6.4 K/UL (ref 1.8–7.7)
NEUTROPHILS NFR BLD: 76 % (ref 38–73)
NITRITE UR QL STRIP: NEGATIVE
PH UR STRIP: 8 [PH] (ref 5–8)
PLATELET # BLD AUTO: 235 K/UL (ref 150–350)
PMV BLD AUTO: 9.4 FL (ref 9.2–12.9)
PROT UR QL STRIP: NEGATIVE
RBC # BLD AUTO: 4.2 M/UL (ref 4.6–6.2)
SP GR UR STRIP: 1.01 (ref 1–1.03)
URN SPEC COLLECT METH UR: ABNORMAL
UROBILINOGEN UR STRIP-ACNC: ABNORMAL EU/DL
WBC # BLD AUTO: 8.5 K/UL (ref 3.9–12.7)

## 2019-10-01 PROCEDURE — 99214 PR OFFICE/OUTPT VISIT, EST, LEVL IV, 30-39 MIN: ICD-10-PCS | Mod: S$GLB,,, | Performed by: PHYSICIAN ASSISTANT

## 2019-10-01 PROCEDURE — 85025 COMPLETE CBC W/AUTO DIFF WBC: CPT

## 2019-10-01 PROCEDURE — 81003 URINALYSIS AUTO W/O SCOPE: CPT

## 2019-10-01 PROCEDURE — 3080F PR MOST RECENT DIASTOLIC BLOOD PRESSURE >= 90 MM HG: ICD-10-PCS | Mod: CPTII,S$GLB,, | Performed by: PHYSICIAN ASSISTANT

## 2019-10-01 PROCEDURE — 3077F PR MOST RECENT SYSTOLIC BLOOD PRESSURE >= 140 MM HG: ICD-10-PCS | Mod: CPTII,S$GLB,, | Performed by: PHYSICIAN ASSISTANT

## 2019-10-01 PROCEDURE — 80053 COMPREHEN METABOLIC PANEL: CPT

## 2019-10-01 PROCEDURE — 83690 ASSAY OF LIPASE: CPT

## 2019-10-01 PROCEDURE — 96374 THER/PROPH/DIAG INJ IV PUSH: CPT

## 2019-10-01 PROCEDURE — 3077F SYST BP >= 140 MM HG: CPT | Mod: CPTII,S$GLB,, | Performed by: PHYSICIAN ASSISTANT

## 2019-10-01 PROCEDURE — 96361 HYDRATE IV INFUSION ADD-ON: CPT

## 2019-10-01 PROCEDURE — 99214 OFFICE O/P EST MOD 30 MIN: CPT | Mod: S$GLB,,, | Performed by: PHYSICIAN ASSISTANT

## 2019-10-01 PROCEDURE — 3008F PR BODY MASS INDEX (BMI) DOCUMENTED: ICD-10-PCS | Mod: CPTII,S$GLB,, | Performed by: PHYSICIAN ASSISTANT

## 2019-10-01 PROCEDURE — 99285 EMERGENCY DEPT VISIT HI MDM: CPT | Mod: 25

## 2019-10-01 PROCEDURE — 3080F DIAST BP >= 90 MM HG: CPT | Mod: CPTII,S$GLB,, | Performed by: PHYSICIAN ASSISTANT

## 2019-10-01 PROCEDURE — 3008F BODY MASS INDEX DOCD: CPT | Mod: CPTII,S$GLB,, | Performed by: PHYSICIAN ASSISTANT

## 2019-10-01 RX ORDER — METHOCARBAMOL 750 MG/1
TABLET, FILM COATED ORAL
Qty: 90 TABLET | Refills: 0 | Status: SHIPPED | OUTPATIENT
Start: 2019-10-01 | End: 2020-10-01

## 2019-10-02 ENCOUNTER — ANESTHESIA EVENT (OUTPATIENT)
Dept: SURGERY | Facility: HOSPITAL | Age: 39
End: 2019-10-02
Payer: COMMERCIAL

## 2019-10-02 ENCOUNTER — ANESTHESIA (OUTPATIENT)
Dept: SURGERY | Facility: HOSPITAL | Age: 39
End: 2019-10-02
Payer: COMMERCIAL

## 2019-10-02 ENCOUNTER — CLINICAL SUPPORT (OUTPATIENT)
Dept: SMOKING CESSATION | Facility: CLINIC | Age: 39
End: 2019-10-02

## 2019-10-02 VITALS
BODY MASS INDEX: 22.91 KG/M2 | TEMPERATURE: 97 F | SYSTOLIC BLOOD PRESSURE: 143 MMHG | RESPIRATION RATE: 20 BRPM | HEIGHT: 74 IN | WEIGHT: 178.56 LBS | DIASTOLIC BLOOD PRESSURE: 93 MMHG | OXYGEN SATURATION: 95 % | HEART RATE: 62 BPM

## 2019-10-02 DIAGNOSIS — F17.210 CIGARETTE SMOKER: Primary | ICD-10-CM

## 2019-10-02 PROBLEM — K80.70 CHOLELITHIASIS WITH CHOLEDOCHOLITHIASIS: Status: ACTIVE | Noted: 2019-10-02

## 2019-10-02 PROBLEM — K80.20 SYMPTOMATIC CHOLELITHIASIS: Status: ACTIVE | Noted: 2019-10-02

## 2019-10-02 PROBLEM — R10.11 RUQ ABDOMINAL PAIN: Status: ACTIVE | Noted: 2019-10-02

## 2019-10-02 LAB
ALBUMIN SERPL BCP-MCNC: 4.2 G/DL (ref 3.5–5.2)
ALBUMIN SERPL BCP-MCNC: 4.8 G/DL (ref 3.5–5.2)
ALP SERPL-CCNC: 73 U/L (ref 55–135)
ALP SERPL-CCNC: 80 U/L (ref 55–135)
ALT SERPL W/O P-5'-P-CCNC: 188 U/L (ref 10–44)
ALT SERPL W/O P-5'-P-CCNC: 86 U/L (ref 10–44)
ANION GAP SERPL CALC-SCNC: 14 MMOL/L (ref 8–16)
ANION GAP SERPL CALC-SCNC: 8 MMOL/L (ref 8–16)
AST SERPL-CCNC: 221 U/L (ref 10–40)
AST SERPL-CCNC: 387 U/L (ref 10–40)
BILIRUB SERPL-MCNC: 2.6 MG/DL (ref 0.1–1)
BILIRUB SERPL-MCNC: 4.8 MG/DL (ref 0.1–1)
BUN SERPL-MCNC: 12 MG/DL (ref 6–20)
BUN SERPL-MCNC: 9 MG/DL (ref 6–20)
CALCIUM SERPL-MCNC: 12.2 MG/DL (ref 8.7–10.5)
CALCIUM SERPL-MCNC: 9.9 MG/DL (ref 8.7–10.5)
CHLORIDE SERPL-SCNC: 106 MMOL/L (ref 95–110)
CHLORIDE SERPL-SCNC: 99 MMOL/L (ref 95–110)
CO2 SERPL-SCNC: 24 MMOL/L (ref 23–29)
CO2 SERPL-SCNC: 25 MMOL/L (ref 23–29)
CREAT SERPL-MCNC: 0.8 MG/DL (ref 0.5–1.4)
CREAT SERPL-MCNC: 1 MG/DL (ref 0.5–1.4)
EST. GFR  (AFRICAN AMERICAN): >60 ML/MIN/1.73 M^2
EST. GFR  (AFRICAN AMERICAN): >60 ML/MIN/1.73 M^2
EST. GFR  (NON AFRICAN AMERICAN): >60 ML/MIN/1.73 M^2
EST. GFR  (NON AFRICAN AMERICAN): >60 ML/MIN/1.73 M^2
GLUCOSE SERPL-MCNC: 108 MG/DL (ref 70–110)
GLUCOSE SERPL-MCNC: 92 MG/DL (ref 70–110)
LIPASE SERPL-CCNC: 26 U/L (ref 4–60)
POTASSIUM SERPL-SCNC: 3.7 MMOL/L (ref 3.5–5.1)
POTASSIUM SERPL-SCNC: 3.7 MMOL/L (ref 3.5–5.1)
PROT SERPL-MCNC: 6.9 G/DL (ref 6–8.4)
PROT SERPL-MCNC: 7.9 G/DL (ref 6–8.4)
SODIUM SERPL-SCNC: 137 MMOL/L (ref 136–145)
SODIUM SERPL-SCNC: 139 MMOL/L (ref 136–145)

## 2019-10-02 PROCEDURE — 25000003 PHARM REV CODE 250: Performed by: NURSE ANESTHETIST, CERTIFIED REGISTERED

## 2019-10-02 PROCEDURE — 36415 COLL VENOUS BLD VENIPUNCTURE: CPT

## 2019-10-02 PROCEDURE — 88304 TISSUE EXAM BY PATHOLOGIST: CPT | Mod: 26,,, | Performed by: PATHOLOGY

## 2019-10-02 PROCEDURE — 96360 HYDRATION IV INFUSION INIT: CPT

## 2019-10-02 PROCEDURE — 63600175 PHARM REV CODE 636 W HCPCS: Performed by: EMERGENCY MEDICINE

## 2019-10-02 PROCEDURE — 25500020 PHARM REV CODE 255: Performed by: STUDENT IN AN ORGANIZED HEALTH CARE EDUCATION/TRAINING PROGRAM

## 2019-10-02 PROCEDURE — 99222 1ST HOSP IP/OBS MODERATE 55: CPT | Mod: 57,,, | Performed by: STUDENT IN AN ORGANIZED HEALTH CARE EDUCATION/TRAINING PROGRAM

## 2019-10-02 PROCEDURE — 27201423 OPTIME MED/SURG SUP & DEVICES STERILE SUPPLY: Performed by: STUDENT IN AN ORGANIZED HEALTH CARE EDUCATION/TRAINING PROGRAM

## 2019-10-02 PROCEDURE — 74300 X-RAY BILE DUCTS/PANCREAS: CPT | Mod: 26,,, | Performed by: STUDENT IN AN ORGANIZED HEALTH CARE EDUCATION/TRAINING PROGRAM

## 2019-10-02 PROCEDURE — 47563 LAPARO CHOLECYSTECTOMY/GRAPH: CPT | Mod: ,,, | Performed by: STUDENT IN AN ORGANIZED HEALTH CARE EDUCATION/TRAINING PROGRAM

## 2019-10-02 PROCEDURE — 90686 IIV4 VACC NO PRSV 0.5 ML IM: CPT | Performed by: STUDENT IN AN ORGANIZED HEALTH CARE EDUCATION/TRAINING PROGRAM

## 2019-10-02 PROCEDURE — S0020 INJECTION, BUPIVICAINE HYDRO: HCPCS | Performed by: STUDENT IN AN ORGANIZED HEALTH CARE EDUCATION/TRAINING PROGRAM

## 2019-10-02 PROCEDURE — 37000008 HC ANESTHESIA 1ST 15 MINUTES: Performed by: STUDENT IN AN ORGANIZED HEALTH CARE EDUCATION/TRAINING PROGRAM

## 2019-10-02 PROCEDURE — 63600175 PHARM REV CODE 636 W HCPCS: Performed by: STUDENT IN AN ORGANIZED HEALTH CARE EDUCATION/TRAINING PROGRAM

## 2019-10-02 PROCEDURE — 63600175 PHARM REV CODE 636 W HCPCS: Performed by: NURSE ANESTHETIST, CERTIFIED REGISTERED

## 2019-10-02 PROCEDURE — 25000003 PHARM REV CODE 250: Performed by: STUDENT IN AN ORGANIZED HEALTH CARE EDUCATION/TRAINING PROGRAM

## 2019-10-02 PROCEDURE — 47563 PR LAP,CHOLECYSTECTOMY/GRAPH: ICD-10-PCS | Mod: ,,, | Performed by: STUDENT IN AN ORGANIZED HEALTH CARE EDUCATION/TRAINING PROGRAM

## 2019-10-02 PROCEDURE — 94761 N-INVAS EAR/PLS OXIMETRY MLT: CPT

## 2019-10-02 PROCEDURE — 37000009 HC ANESTHESIA EA ADD 15 MINS: Performed by: STUDENT IN AN ORGANIZED HEALTH CARE EDUCATION/TRAINING PROGRAM

## 2019-10-02 PROCEDURE — 88304 TISSUE SPECIMEN TO PATHOLOGY - SURGERY: ICD-10-PCS | Mod: 26,,, | Performed by: PATHOLOGY

## 2019-10-02 PROCEDURE — 99222 PR INITIAL HOSPITAL CARE,LEVL II: ICD-10-PCS | Mod: 57,,, | Performed by: STUDENT IN AN ORGANIZED HEALTH CARE EDUCATION/TRAINING PROGRAM

## 2019-10-02 PROCEDURE — 36000708 HC OR TIME LEV III 1ST 15 MIN: Performed by: STUDENT IN AN ORGANIZED HEALTH CARE EDUCATION/TRAINING PROGRAM

## 2019-10-02 PROCEDURE — 99406 BEHAV CHNG SMOKING 3-10 MIN: CPT | Mod: S$GLB,,,

## 2019-10-02 PROCEDURE — 90471 IMMUNIZATION ADMIN: CPT | Performed by: STUDENT IN AN ORGANIZED HEALTH CARE EDUCATION/TRAINING PROGRAM

## 2019-10-02 PROCEDURE — 88304 TISSUE EXAM BY PATHOLOGIST: CPT | Performed by: PATHOLOGY

## 2019-10-02 PROCEDURE — 71000039 HC RECOVERY, EACH ADD'L HOUR: Performed by: STUDENT IN AN ORGANIZED HEALTH CARE EDUCATION/TRAINING PROGRAM

## 2019-10-02 PROCEDURE — 71000033 HC RECOVERY, INTIAL HOUR: Performed by: STUDENT IN AN ORGANIZED HEALTH CARE EDUCATION/TRAINING PROGRAM

## 2019-10-02 PROCEDURE — 36000709 HC OR TIME LEV III EA ADD 15 MIN: Performed by: STUDENT IN AN ORGANIZED HEALTH CARE EDUCATION/TRAINING PROGRAM

## 2019-10-02 PROCEDURE — 63600175 PHARM REV CODE 636 W HCPCS: Performed by: ANESTHESIOLOGY

## 2019-10-02 PROCEDURE — 80053 COMPREHEN METABOLIC PANEL: CPT

## 2019-10-02 PROCEDURE — S4991 NICOTINE PATCH NONLEGEND: HCPCS | Performed by: EMERGENCY MEDICINE

## 2019-10-02 PROCEDURE — 99406 PT REFUSED TOBACCO CESSATION: ICD-10-PCS | Mod: S$GLB,,,

## 2019-10-02 PROCEDURE — 74300 PR  X-RAY OPER CHOLANGIOGRAM: ICD-10-PCS | Mod: 26,,, | Performed by: STUDENT IN AN ORGANIZED HEALTH CARE EDUCATION/TRAINING PROGRAM

## 2019-10-02 PROCEDURE — 25000003 PHARM REV CODE 250: Performed by: EMERGENCY MEDICINE

## 2019-10-02 PROCEDURE — G0378 HOSPITAL OBSERVATION PER HR: HCPCS

## 2019-10-02 RX ORDER — HYDROMORPHONE HYDROCHLORIDE 2 MG/ML
1 INJECTION, SOLUTION INTRAMUSCULAR; INTRAVENOUS; SUBCUTANEOUS EVERY 4 HOURS PRN
Status: DISCONTINUED | OUTPATIENT
Start: 2019-10-02 | End: 2019-10-02 | Stop reason: HOSPADM

## 2019-10-02 RX ORDER — LIDOCAINE HCL/PF 100 MG/5ML
SYRINGE (ML) INTRAVENOUS
Status: DISCONTINUED | OUTPATIENT
Start: 2019-10-02 | End: 2019-10-02

## 2019-10-02 RX ORDER — HYDROMORPHONE HYDROCHLORIDE 2 MG/ML
0.5 INJECTION, SOLUTION INTRAMUSCULAR; INTRAVENOUS; SUBCUTANEOUS EVERY 5 MIN PRN
Status: DISCONTINUED | OUTPATIENT
Start: 2019-10-02 | End: 2019-10-02 | Stop reason: HOSPADM

## 2019-10-02 RX ORDER — FENTANYL CITRATE 50 UG/ML
INJECTION, SOLUTION INTRAMUSCULAR; INTRAVENOUS
Status: DISCONTINUED | OUTPATIENT
Start: 2019-10-02 | End: 2019-10-02

## 2019-10-02 RX ORDER — SODIUM CHLORIDE, SODIUM LACTATE, POTASSIUM CHLORIDE, CALCIUM CHLORIDE 600; 310; 30; 20 MG/100ML; MG/100ML; MG/100ML; MG/100ML
INJECTION, SOLUTION INTRAVENOUS CONTINUOUS PRN
Status: DISCONTINUED | OUTPATIENT
Start: 2019-10-02 | End: 2019-10-02

## 2019-10-02 RX ORDER — SODIUM CHLORIDE, SODIUM LACTATE, POTASSIUM CHLORIDE, CALCIUM CHLORIDE 600; 310; 30; 20 MG/100ML; MG/100ML; MG/100ML; MG/100ML
INJECTION, SOLUTION INTRAVENOUS CONTINUOUS
Status: DISCONTINUED | OUTPATIENT
Start: 2019-10-02 | End: 2019-10-02

## 2019-10-02 RX ORDER — MIDAZOLAM HYDROCHLORIDE 1 MG/ML
INJECTION INTRAMUSCULAR; INTRAVENOUS
Status: DISCONTINUED | OUTPATIENT
Start: 2019-10-02 | End: 2019-10-02

## 2019-10-02 RX ORDER — HYDROCODONE BITARTRATE AND ACETAMINOPHEN 10; 325 MG/1; MG/1
1 TABLET ORAL EVERY 6 HOURS PRN
Status: DISCONTINUED | OUTPATIENT
Start: 2019-10-02 | End: 2019-10-02 | Stop reason: HOSPADM

## 2019-10-02 RX ORDER — ONDANSETRON 2 MG/ML
4 INJECTION INTRAMUSCULAR; INTRAVENOUS ONCE AS NEEDED
Status: DISCONTINUED | OUTPATIENT
Start: 2019-10-02 | End: 2019-10-02 | Stop reason: HOSPADM

## 2019-10-02 RX ORDER — SODIUM CHLORIDE 0.9 % (FLUSH) 0.9 %
10 SYRINGE (ML) INJECTION
Status: DISCONTINUED | OUTPATIENT
Start: 2019-10-02 | End: 2019-10-02 | Stop reason: HOSPADM

## 2019-10-02 RX ORDER — IBUPROFEN 200 MG
1 TABLET ORAL
Status: DISCONTINUED | OUTPATIENT
Start: 2019-10-02 | End: 2019-10-02 | Stop reason: HOSPADM

## 2019-10-02 RX ORDER — KETOROLAC TROMETHAMINE 30 MG/ML
INJECTION, SOLUTION INTRAMUSCULAR; INTRAVENOUS
Status: DISCONTINUED | OUTPATIENT
Start: 2019-10-02 | End: 2019-10-02

## 2019-10-02 RX ORDER — DEXAMETHASONE SODIUM PHOSPHATE 4 MG/ML
INJECTION, SOLUTION INTRA-ARTICULAR; INTRALESIONAL; INTRAMUSCULAR; INTRAVENOUS; SOFT TISSUE
Status: DISCONTINUED | OUTPATIENT
Start: 2019-10-02 | End: 2019-10-02

## 2019-10-02 RX ORDER — PHENYLEPHRINE HYDROCHLORIDE 10 MG/ML
INJECTION INTRAVENOUS
Status: DISCONTINUED | OUTPATIENT
Start: 2019-10-02 | End: 2019-10-02

## 2019-10-02 RX ORDER — ONDANSETRON 2 MG/ML
4 INJECTION INTRAMUSCULAR; INTRAVENOUS EVERY 8 HOURS PRN
Status: DISCONTINUED | OUTPATIENT
Start: 2019-10-02 | End: 2019-10-02 | Stop reason: HOSPADM

## 2019-10-02 RX ORDER — MORPHINE SULFATE 2 MG/ML
2 INJECTION, SOLUTION INTRAMUSCULAR; INTRAVENOUS EVERY 4 HOURS PRN
Status: DISCONTINUED | OUTPATIENT
Start: 2019-10-02 | End: 2019-10-02 | Stop reason: HOSPADM

## 2019-10-02 RX ORDER — HYDROCODONE BITARTRATE AND ACETAMINOPHEN 10; 325 MG/1; MG/1
1 TABLET ORAL EVERY 6 HOURS PRN
Qty: 21 TABLET | Refills: 0 | Status: SHIPPED | OUTPATIENT
Start: 2019-10-02 | End: 2020-11-18

## 2019-10-02 RX ORDER — METHOCARBAMOL 500 MG/1
500 TABLET, FILM COATED ORAL 3 TIMES DAILY
Status: DISCONTINUED | OUTPATIENT
Start: 2019-10-02 | End: 2019-10-02 | Stop reason: HOSPADM

## 2019-10-02 RX ORDER — NEOSTIGMINE METHYLSULFATE 1 MG/ML
INJECTION, SOLUTION INTRAVENOUS
Status: DISCONTINUED | OUTPATIENT
Start: 2019-10-02 | End: 2019-10-02

## 2019-10-02 RX ORDER — GLYCOPYRROLATE 0.2 MG/ML
INJECTION INTRAMUSCULAR; INTRAVENOUS
Status: DISCONTINUED | OUTPATIENT
Start: 2019-10-02 | End: 2019-10-02

## 2019-10-02 RX ORDER — HYDRALAZINE HYDROCHLORIDE 20 MG/ML
10 INJECTION INTRAMUSCULAR; INTRAVENOUS
Status: COMPLETED | OUTPATIENT
Start: 2019-10-02 | End: 2019-10-02

## 2019-10-02 RX ORDER — PROPOFOL 10 MG/ML
VIAL (ML) INTRAVENOUS
Status: DISCONTINUED | OUTPATIENT
Start: 2019-10-02 | End: 2019-10-02

## 2019-10-02 RX ORDER — BUPIVACAINE HYDROCHLORIDE 5 MG/ML
INJECTION, SOLUTION EPIDURAL; INTRACAUDAL
Status: DISCONTINUED | OUTPATIENT
Start: 2019-10-02 | End: 2019-10-02 | Stop reason: HOSPADM

## 2019-10-02 RX ORDER — ROCURONIUM BROMIDE 10 MG/ML
INJECTION, SOLUTION INTRAVENOUS
Status: DISCONTINUED | OUTPATIENT
Start: 2019-10-02 | End: 2019-10-02

## 2019-10-02 RX ORDER — SODIUM CHLORIDE 9 MG/ML
INJECTION, SOLUTION INTRAVENOUS CONTINUOUS PRN
Status: DISCONTINUED | OUTPATIENT
Start: 2019-10-02 | End: 2019-10-02

## 2019-10-02 RX ADMIN — NICOTINE 1 PATCH: 21 PATCH, EXTENDED RELEASE TRANSDERMAL at 03:10

## 2019-10-02 RX ADMIN — GLUCAGON HYDROCHLORIDE 1 MG: 1 INJECTION, POWDER, FOR SOLUTION INTRAMUSCULAR; INTRAVENOUS; SUBCUTANEOUS at 12:10

## 2019-10-02 RX ADMIN — PROPOFOL 50 MG: 10 INJECTION, EMULSION INTRAVENOUS at 12:10

## 2019-10-02 RX ADMIN — SODIUM CHLORIDE, SODIUM LACTATE, POTASSIUM CHLORIDE, AND CALCIUM CHLORIDE: .6; .31; .03; .02 INJECTION, SOLUTION INTRAVENOUS at 03:10

## 2019-10-02 RX ADMIN — HYDROMORPHONE HYDROCHLORIDE 0.5 MG: 2 INJECTION, SOLUTION INTRAMUSCULAR; INTRAVENOUS; SUBCUTANEOUS at 01:10

## 2019-10-02 RX ADMIN — PROPOFOL 200 MG: 10 INJECTION, EMULSION INTRAVENOUS at 11:10

## 2019-10-02 RX ADMIN — HYDROMORPHONE HYDROCHLORIDE 0.6 MG: 2 INJECTION INTRAMUSCULAR; INTRAVENOUS; SUBCUTANEOUS at 01:10

## 2019-10-02 RX ADMIN — ONDANSETRON 8 MG: 2 INJECTION, SOLUTION INTRAMUSCULAR; INTRAVENOUS at 11:10

## 2019-10-02 RX ADMIN — INFLUENZA VIRUS VACCINE 0.5 ML: 15; 15; 15; 15 SUSPENSION INTRAMUSCULAR at 05:10

## 2019-10-02 RX ADMIN — FENTANYL CITRATE 50 MCG: 50 INJECTION, SOLUTION INTRAMUSCULAR; INTRAVENOUS at 12:10

## 2019-10-02 RX ADMIN — PROPOFOL 100 MG: 10 INJECTION, EMULSION INTRAVENOUS at 11:10

## 2019-10-02 RX ADMIN — LIDOCAINE HYDROCHLORIDE 40 MG: 20 INJECTION, SOLUTION INTRAVENOUS at 11:10

## 2019-10-02 RX ADMIN — FENTANYL CITRATE 50 MCG: 50 INJECTION, SOLUTION INTRAMUSCULAR; INTRAVENOUS at 11:10

## 2019-10-02 RX ADMIN — PHENYLEPHRINE HYDROCHLORIDE 100 MCG: 10 INJECTION INTRAVENOUS at 11:10

## 2019-10-02 RX ADMIN — KETOROLAC TROMETHAMINE 30 MG: 30 INJECTION, SOLUTION INTRAMUSCULAR; INTRAVENOUS at 12:10

## 2019-10-02 RX ADMIN — SODIUM CHLORIDE: 0.9 INJECTION, SOLUTION INTRAVENOUS at 11:10

## 2019-10-02 RX ADMIN — NEOSTIGMINE METHYLSULFATE 5 MG: 1 INJECTION INTRAVENOUS at 01:10

## 2019-10-02 RX ADMIN — HYDRALAZINE HYDROCHLORIDE 10 MG: 20 INJECTION INTRAMUSCULAR; INTRAVENOUS at 03:10

## 2019-10-02 RX ADMIN — CEFTRIAXONE 2 G: 1 INJECTION, SOLUTION INTRAVENOUS at 11:10

## 2019-10-02 RX ADMIN — SODIUM CHLORIDE, SODIUM LACTATE, POTASSIUM CHLORIDE, AND CALCIUM CHLORIDE: .6; .31; .03; .02 INJECTION, SOLUTION INTRAVENOUS at 12:10

## 2019-10-02 RX ADMIN — SODIUM CHLORIDE 1000 ML: 0.9 INJECTION, SOLUTION INTRAVENOUS at 12:10

## 2019-10-02 RX ADMIN — ROCURONIUM BROMIDE 50 MG: 10 INJECTION, SOLUTION INTRAVENOUS at 11:10

## 2019-10-02 RX ADMIN — METHOCARBAMOL TABLETS 500 MG: 500 TABLET, COATED ORAL at 03:10

## 2019-10-02 RX ADMIN — GLYCOPYRROLATE 0.6 MG: 0.2 INJECTION, SOLUTION INTRAMUSCULAR; INTRAVENOUS at 01:10

## 2019-10-02 RX ADMIN — HYDROMORPHONE HYDROCHLORIDE 0.4 MG: 2 INJECTION INTRAMUSCULAR; INTRAVENOUS; SUBCUTANEOUS at 01:10

## 2019-10-02 RX ADMIN — MIDAZOLAM HYDROCHLORIDE 2 MG: 1 INJECTION, SOLUTION INTRAMUSCULAR; INTRAVENOUS at 11:10

## 2019-10-02 RX ADMIN — PROMETHAZINE HYDROCHLORIDE 6.25 MG: 25 INJECTION INTRAMUSCULAR; INTRAVENOUS at 01:10

## 2019-10-02 RX ADMIN — DEXAMETHASONE SODIUM PHOSPHATE 8 MG: 4 INJECTION, SOLUTION INTRAMUSCULAR; INTRAVENOUS at 11:10

## 2019-10-02 NOTE — PATIENT INSTRUCTIONS
PLEASE READ YOUR DISCHARGE INSTRUCTIONS ENTIRELY AS IT CONTAINS IMPORTANT INFORMATION.  Please go to the ER now.  Do not eat or drink anything until you are evaluated.   - Rest.    - Drink plenty of fluids.    - Tylenol or Ibuprofen as directed as needed for fever/pain.    - Follow up with your PCP or specialty clinic as directed in the next 1-2 weeks if not improved or as needed.  You can call [phone] to schedule an appointment with the appropriate provider.    - If you were prescribed antibiotics, please take them to completion.  - If you were prescribed a narcotic medication, do not drive or operate heavy equipment or machinery while taking these medications.  - If you  smoke, please stop smoking.  -You must understand that you've received an Urgent Care treatment only and that you may be released before all your medical problems are known or treated. You, the patient, will    arrange for follow up care as instructed.  - Please return to Urgent Care or to the Emergency Department if your symptoms worsen.    Patient aware and verbalized understanding.    Abdominal Pain    Abdominal pain is pain in the stomach or belly area. Everyone has this pain from time to time. In many cases it goes away on its own. But abdominal pain can sometimes be due to a serious problem, such as appendicitis. So its important to know when to seek help.  Causes of abdominal pain  There are many possible causes of abdominal pain. Common causes in adults include:  · Constipation, diarrhea, or gas  · Stomach acid flowing back up into the esophagus (acid reflux or heartburn)  · Severe acid reflux, called GERD (gastroesophageal reflux disease)  · A sore in the lining of the stomach or small intestine (peptic ulcer)  · Inflammation of the gallbladder, liver, or pancreas  · Gallstones or kidney stones  · Appendicitis   · Intestinal blockage   · An internal organ pushing through a muscle or other tissue (hernia)  · Urinary tract  infections  · In women, menstrual cramps, fibroids, or endometriosis  · Inflammation or infection of the intestines  Diagnosing the cause of abdominal pain  Your healthcare provider will do a physical exam help find the cause of your pain. If needed, tests will be ordered. Belly pain has many possible causes. So it can be hard to find the reason for your pain. Giving details about your pain can help. Tell your provider where and when you feel the pain, and what makes it better or worse. Also let your provider know if you have other symptoms such as:  · Fever  · Tiredness  · Upset stomach (nausea)  · Vomiting  · Changes in bathroom habits  Treating abdominal pain  Some causes of pain need emergency medical treatment right away. These include appendicitis or a bowel blockage. Other problems can be treated with rest, fluids, or medicines. Your healthcare provider can give you specific instructions for treatment or self-care based on what is causing your pain.  If you have vomiting or diarrhea, sip water or other clear fluids. When you are ready to eat solid foods again, start with small amounts of easy-to-digest, low-fat foods. These include apple sauce, toast, or crackers.   When to seek medical care  Call 911 or go to the hospital right away if you:  · Cant pass stool and are vomiting  · Are vomiting blood or have bloody diarrhea or black, tarry diarrhea  · Have chest, neck, or shoulder pain  · Feel like you might pass out  · Have pain in your shoulder blades with nausea  · Have sudden, severe belly pain  · Have new, severe pain unlike any you have felt before  · Have a belly that is rigid, hard, and tender to touch  Call your healthcare provider if you have:  · Pain for more than 5 days  · Bloating for more than 2 days  · Diarrhea for more than 5 days  · A fever of 100.4°F (38.0°C) or higher, or as directed by your provider  · Pain that gets worse  · Weight loss for no reason  · Continued lack of appetite  · Blood  in your stool  How to prevent abdominal pain  Here are some tips to help prevent abdominal pain:  · Eat smaller amounts of food at one time.  · Avoid greasy, fried, or other high-fat foods.  · Avoid foods that give you gas.  · Exercise regularly.  · Drink plenty of fluids.  To help prevent GERD symptoms:  · Quit smoking.  · Reduce alcohol and certain foods that increase stomach acid.  · Avoid aspirin and over-the-counter pain and fever medicines (NSAIDS or nonsteroidal anti-inflammatory drugs), if possible  · Lose extra weight.  · Finish eating at least 2 hours before you go to bed or lie down.  · Raise the head of your bed.  Date Last Reviewed: 7/1/2016  © 6994-7374 [employer]. [address] [place] [address], [place] [address]. All rights reserved. This information is not intended as a substitute for professional medical care. Always follow your healthcare professional's instructions.

## 2019-10-02 NOTE — NURSING
Patient is being discharged home per MD. Discharge instructions on medications, signs and symptoms when to call the MD, diet, activity, post-op care and follow-up visits are given to the patient and patient verbalized complete understanding on the above discharge instructions.  Prefers to walk to the Exit per self and will be picked up by friend.  Voiced no other concerns.  Safety maintained.

## 2019-10-02 NOTE — ED NOTES
Patient identifiers for Erik Mims checked and correct.  LOC: The patient is awake, alert and aware of environment with an appropriate affect, the patient is oriented x 3 and speaking appropriately.  APPEARANCE: Patient resting comfortably and in no acute distress, patient is clean and well groomed, patient's clothing are properly fastened.  SKIN: The skin is warm and dry, patient has normal skin turgor and moist mucus membranes, skin intact, no breakdown or brusing noted.  MUSKULOSKELETAL: Patient moving all extremities well, no obvious swelling or deformities noted.  RESPIRATORY: Airway is open and patent, respirations are spontaneous, patient has a normal effort and rate.  ABDOMEN: Tender right upper quadrant.

## 2019-10-02 NOTE — OP NOTE
DATE OF SURGERY: 10/02/2019    PREOPERATIVE DIAGNOSIS: Choledocholithiasis, umbilical hernia.    POSTOPERATIVE DIAGNOSIS: Umbilical hernia, Chronic cholecystitis and choledocholithiasis.    PROCEDURE: Laparoscopic cholecystectomy with intraoperative cholangiogram and primary umbilical hernia repair.    SURGEON: Dylan Licona M.D.    ASSISTANT: Osmani Burrell PGY2    ANESTHESIA: General endotracheal.    PREP: Chlorhexidine.    ESTIMATED BLOOD LOSS: Minimal.    SPECIMEN: Gallbladder.    INDICATIONS: The patient is a 39 y.o. male who presents to ED with   signs and symptoms of choledocholithiasis. The patient was counseled on options  for treatment and desired surgical intervention. The risks of the surgery were  described to the patient including bleeding, infection, pain, scar and wound   complications, injury to local structures warranting more extensive surgery and   potential need for further intervention. The patient demonstrated understanding  of these risks and a consent form was obtained.      PROCEDURE: The patient was identified in Preoperative Unit and taken back to   the Operating Room, laid supine on the operating room table. IV antibiotics   were administered prior to induction of general anesthesia. General anesthesia   was induced without complication. The patient was then prepped and draped in   standard sterile fashion. A timeout procedure was performed according to the   hospital protocol. Local anesthesia was infiltrated into the skin and   subcutaneous tissues of the incision sites.     A 3 mm incision was made at the umbilical location through a small hernia defect with an 15 blade scalpel.The abdomen was then entered using a 5mm optical trocar. CO2   insufflation was initiated. The camera was inserted and the abdomen was   Inspected. There was evidence of chronic cholecystitis in the   right upper quadrant. An 11-mm subxiphoid trocar was then placed under direct   visualization, followed by two  5-mm trocars in the right subcostal location.   The gallbladder was grasped and retracted into the right upper quadrant over the  Liver. The gallbladder was somewhat edematous. The infundibulum was identified in the cystic duct and artery were then  dissected until the critical view was obtained.     One mg of glucagon was given intravenously. At this point, a miranda clamp was   placed on the distal cystic duct and the cholangiogram catheter was inserted.  The catheter was clipped into place and   then a cholangiogram was performed. There was opacification of the second-order   biliary tree, the common duct and quick filling into the duodenum. No fillinging defects were noted.  At this point, the cholangiogram catheter was  removed. Two clips were placed   on the proximal duct And it was divided. Two clips were placed on the proximal   cystic artery and one Distally and then this structure was also divided. The   gallbladder was then removed from the liver fossa with Bovie cautery. In doing so  The gallbladder was entered and bilious fluid with sludge spilled into the right  Upper quadrant. This fluid was irrigated and evacuated.     Once the gallbladder was  Removed it was placed into EndoCatch bag and removed through the   umbilical port. At this point, the right upper quadrant was irrigated again and the   fluid was evacuated. Hemostasis was achieved and then confirmed. The ports   were then removed under direct visualization. The umbilical fascia was closed   using 0 ethibond suture in a figure-of-eight fashion. The subxiphoid fascia was closed using 0 vicryl. The skin incisions were   closed using 5-0 Monocryl suture in interrupted fashion. Dry sterile dressings   were then applied. The patient was awakened from general anesthesia without   complication and returned to the Postoperative Recovery Unit in stable   condition. At the end of the case, sponge and needle counts were correct on 2   occasions. I was  present and scrubbed throughout the entirety of the case.      COMPLICATIONS: None.    CONDITION: Stable.

## 2019-10-02 NOTE — ANESTHESIA PREPROCEDURE EVALUATION
10/02/2019  Erik Mims is a 39 y.o., male for ya hernandez.    Anesthesia Evaluation     I have reviewed the Nursing Notes.   I have reviewed the Medications.     Review of Systems  Anesthesia Hx:  No problems with previous Anesthesia  Denies Personal Hx of Anesthesia complications.   Social:  Smoker, Alcohol Use    Hematology/Oncology:  Hematology Normal   Oncology Normal     EENT/Dental:EENT/Dental Normal   Cardiovascular:   Exercise tolerance: good Hypertension Neg cardiac w/u 2017   Pulmonary:   Sleep Apnea   Hx of spontaneous pneumo.    Hepatic/GI:   Cholelithiasis  RUQ pain  Transaminitis       Physical Exam  General:  Well nourished    Airway/Jaw/Neck:  Airway Findings: Mouth Opening: Normal Tongue: Large  Mallampati: III  TM Distance: Normal, at least 6 cm  Jaw/Neck Findings:  Neck ROM: Normal ROM     Eyes/Ears/Nose:  EYES/EARS/NOSE FINDINGS: Normal   Dental:  Denies loose teeth   Chest/Lungs:  Chest/Lungs Clear    Heart/Vascular:  Heart Findings: Normal    Abdomen:  RUQ TTP   Musculoskeletal:  Musculoskeletal Findings: Normal   Skin:  Skin Findings: Normal    Mental Status:  Mental Status Findings:  Cooperative, Alert and Oriented         Anesthesia Plan  Type of Anesthesia, risks & benefits discussed:  Anesthesia Type:  general  Patient's Preference:   Intra-op Monitoring Plan: standard ASA monitors  Intra-op Monitoring Plan Comments:   Post Op Pain Control Plan:   Post Op Pain Control Plan Comments:   Induction:   IV  Beta Blocker:  Patient is not currently on a Beta-Blocker (No further documentation required).       Informed Consent: Patient understands risks and agrees with Anesthesia plan.  Questions answered. Anesthesia consent signed with patient.  ASA Score: 2     Day of Surgery Review of History & Physical: I have interviewed and examined the patient. I have reviewed the patient's  H&P dated:            Ready For Surgery From Anesthesia Perspective.

## 2019-10-02 NOTE — ANESTHESIA POSTPROCEDURE EVALUATION
Anesthesia Post Evaluation    Patient: Erik Mims    Procedure(s) Performed: Procedure(s) (LRB):  CHOLECYSTECTOMY, LAPAROSCOPIC, WITH CHOLANGIOGRAM (N/A)    Final Anesthesia Type: general  Patient location during evaluation: PACU  Patient participation: Yes- Able to Participate  Level of consciousness: awake and alert  Post-procedure vital signs: reviewed and stable  Pain management: adequate  Airway patency: patent  PONV status at discharge: No PONV  Anesthetic complications: no      Cardiovascular status: blood pressure returned to baseline  Respiratory status: unassisted  Hydration status: euvolemic  Follow-up not needed.          Vitals Value Taken Time   /75 10/2/2019  2:05 PM   Temp 36.8 °C (98.3 °F) 10/2/2019  2:00 PM   Pulse 56 10/2/2019  2:06 PM   Resp 49 10/2/2019  2:06 PM   SpO2 94 % 10/2/2019  2:06 PM   Vitals shown include unvalidated device data.      No case tracking events are documented in the log.      Pain/Sonia Score: Pain Rating Prior to Med Admin: 6 (10/2/2019  1:46 PM)

## 2019-10-02 NOTE — PLAN OF CARE
VN rounds: VN cued into pt's room with pt's permission. Pt resting in bed. Fall risk protocol discussed with pt. VN instructed pt to call for assistance. Pt aware and agreeable. NAD noted. Allowed time for questions.  Will cont to be available and intervene as needed.     10/02/19 0954   Type of Frequent Check   Type Patient Rounds  (vn rounds)   Safety/Activity   Patient Rounds visualized patient;call light in patient/parent reach   Safety Promotion/Fall Prevention instructed to call staff for mobility;Fall Risk reviewed with patient/family   Positioning   Body Position supine   Head of Bed (HOB) HOB elevated   Pain/Comfort/Sleep   Preferred Pain Scale word (verbal rating pain scale)   Pain Body Location abdomen   Pain Rating (0-10): Rest 2   Sleep/Rest/Relaxation awake   Assessments (Pre/Post)   Level of Consciousness (AVPU) alert

## 2019-10-02 NOTE — NURSING
Patient returned to room after surgery.  Patient is awake and alert.  Patient denies pain at this time.  Puncture sites X4 intact with Dermabond.  Safety is maintained.  Will continue to monitor.

## 2019-10-02 NOTE — ED PROVIDER NOTES
Encounter Date: 10/1/2019       History     Chief Complaint   Patient presents with    Abdominal Pain     39y M ambulatory to ED with c/o RUQ epigastric pain since 1500. pt was seen at urgent care earlier today     Patient is 38 y/o male with PMHx of HTN and cholelithiasis seen by general surgery 1 year ago who presents complaining an episode of right upper quadrant abdominal pain that started at 3:00 p.m..  States that pain was sharp and severe.  Rates it a 9 out of 10.  Pain radiated to his epigastrium and to his right upper back.  He also had 2 episodes of vomiting.  Denies any other associated symptoms.  Denies any fever, chills, diarrhea, dysuria, hematuria.  States that pain resolved on its own.  Denies eating any unusual foods.  He did have Chinese food earlier today.  Apparently in the emergency department he is feeling a lot better states that pain has almost completely resolved.        Review of patient's allergies indicates:  No Known Allergies  Past Medical History:   Diagnosis Date    COPD (chronic obstructive pulmonary disease)     Hypertension     Syncope     Tobacco use 7/20/2017     Past Surgical History:   Procedure Laterality Date    LUNG SURGERY       No family history on file.  Social History     Tobacco Use    Smoking status: Current Every Day Smoker     Packs/day: 0.50    Smokeless tobacco: Never Used   Substance Use Topics    Alcohol use: Yes    Drug use: No     Review of Systems   Constitutional: Negative for chills and fever.   HENT: Negative for congestion and rhinorrhea.    Eyes: Negative for pain and visual disturbance.   Respiratory: Negative for cough and shortness of breath.    Cardiovascular: Negative for chest pain.   Gastrointestinal: Positive for abdominal pain, nausea and vomiting.   Genitourinary: Negative for dysuria and hematuria.   Musculoskeletal: Negative for myalgias.   Neurological: Negative for headaches.   Psychiatric/Behavioral: Negative for confusion.        Physical Exam     Initial Vitals [10/01/19 2124]   BP Pulse Resp Temp SpO2   (!) 197/115 79 17 98.9 °F (37.2 °C) 96 %      MAP       --         Physical Exam    Nursing note and vitals reviewed.  Constitutional: He appears well-developed and well-nourished.  Non-toxic appearance. He does not appear ill. No distress.   HENT:   Head: Normocephalic and atraumatic.   Eyes: EOM are normal.   Cardiovascular: Normal rate, regular rhythm and normal heart sounds. Exam reveals no friction rub.    No murmur heard.  Pulmonary/Chest: Effort normal and breath sounds normal. He has no wheezes. He has no rhonchi. He has no rales.   Abdominal: Soft. Normal appearance and bowel sounds are normal. There is tenderness in the right upper quadrant. There is no rigidity, no rebound and no guarding.   Neurological: He is alert and oriented to person, place, and time.   Skin: Skin is warm and dry. Capillary refill takes less than 2 seconds.   Psychiatric: He has a normal mood and affect.         ED Course   Procedures  Labs Reviewed   CBC W/ AUTO DIFFERENTIAL - Abnormal; Notable for the following components:       Result Value    RBC 4.20 (*)     Mean Corpuscular Volume 104 (*)     Mean Corpuscular Hemoglobin 36.0 (*)     Gran% 76.0 (*)     Lymph% 14.6 (*)     All other components within normal limits   URINALYSIS, REFLEX TO URINE CULTURE - Abnormal; Notable for the following components:    Appearance, UA Hazy (*)     Urobilinogen, UA 4.0-6.0 (*)     All other components within normal limits    Narrative:     Preferred Collection Type->Urine, Clean Catch   COMPREHENSIVE METABOLIC PANEL   LIPASE          Imaging Results    None          Medical Decision Making:   Initial Assessment:   Patient here with right upper quadrant abdominal pain.  Differential Diagnosis:   Gastroenteritis, gastritis, ulcer, cholecystitis, gallstones, pancreatitis, ileus, small bowel obstruction, appendicitis, constipation.     Clinical Tests:   Lab Tests:  Ordered and Reviewed  Radiological Study: Ordered and Reviewed  ED Management:  Patient's pain improved by the time of my examination. However his ED workup revealed:  - T bili 2.6, AST 2221, ALT 86, alk phos WNL  - Cr WNL  - calcium elevated  - No leukocytosis and H&H stable  - RUQ US: Cholelithiasis and nonspecific gallbladder wall thickening.  I reviewed the images myself and felt that GB wall was significantly thickened and there is pericholecystic fluid.    Discussed case with Dr Licona who would like patient admitted to his service.  Instructed to hold abx at this time                   ED Course as of Oct 02 1319   Tue Oct 01, 2019   2355 BP(!): 197/115 [LD]   2355 Temp: 98.9 °F (37.2 °C) [LD]   2355 Pulse: 79 [LD]   2355 Resp: 17 [LD]   2355 SpO2: 96 % [LD]   Wed Oct 02, 2019   0016 Calcium(!!): 12.2 [LD]   0018 BILIRUBIN TOTAL(!): 2.6 [LD]   0018 AST(!): 221 [LD]   0018 ALT(!): 86 [LD]      ED Course User Index  [LD] Isabelle Matthews MD     Clinical Impression:       ICD-10-CM ICD-9-CM   1. Cholelithiasis with choledocholithiasis K80.70 574.90   2. RUQ abdominal pain R10.11 789.01   3. Symptomatic cholelithiasis K80.20 574.20   4. Chronic cholecystitis K81.1 575.11         Disposition:   Disposition: Admitted  Condition: Stable                        Isabelle Matthews MD  10/02/19 9081

## 2019-10-02 NOTE — PLAN OF CARE
[place] met with pt - lives alone   will recuperate with friend Ann Baldwin      pt's physical address [address] [place]     pt has transportation to home --   meds to be delivered to bedside.          10/02/19 1709   Discharge Assessment   Assessment Type Discharge Planning Assessment   Confirmed/corrected address and phone number on facesheet? Yes   Assessment information obtained from? Patient   Expected Length of Stay (days) 1   Communicated expected length of stay with patient/caregiver yes   Prior to hospitilization cognitive status: Alert/Oriented   Prior to hospitalization functional status: Independent   Current cognitive status: Alert/Oriented   Current Functional Status: Independent   Lives With alone   Able to Return to Prior Arrangements   (will spend the next few days with a friend )   Is patient able to care for self after discharge? Yes   Who are your caregiver(s) and their phone number(s)? Ann Baldwin     (Ann Baldwin  )   Readmission Within the Last 30 Days no previous admission in last 30 days   Patient currently being followed by outpatient case management? No   Patient currently receives any other outside agency services? No   Equipment Currently Used at Home none   Do you have any problems affording any of your prescribed medications? No   Does the patient have transportation home? Yes   Transportation Anticipated family or friend will provide   Does the patient receive services at the [facility]? No   Discharge Plan A Home;Home with family   DME Needed Upon Discharge  none   Patient/Family in Agreement with Plan yes

## 2019-10-02 NOTE — PLAN OF CARE
Patient has met PACU discharge criteria, VSS, pain well controlled. Family updated by phone. Released from PACU by Dr. Mendoza*

## 2019-10-02 NOTE — PROGRESS NOTES
"Subjective:       Patient ID: Erik Mims is a 39 y.o. male.    Vitals:  height is 6' 2" (1.88 m) and weight is 82.6 kg (182 lb). His temperature is 98.1 °F (36.7 °C). His blood pressure is 185/112 (abnormal) and his pulse is 78. His respiration is 18 and oxygen saturation is 96%.     Chief Complaint: Abdominal Pain    Mr. Mims presents for evaluation of RUQ pain.  It started suddenly at 3pm this afternoon.  It is severe & has gotten worse.  He has had nausea & one episode of vomiting.  He denies any fevers, but has had chills.  He is having normal BMs, denies bloody or tarry stools.  The pain is sharp & radiates to the shoulder.  Standing up & walking seems to slightly improve his pain.  He denies any association with food.  He has tried Zantac with no relief.  He had an abd US in 2017 that showed gallbladder polyps.  He does report daily alcohol use at 2 beers in the evenings.     Abdominal Pain   This is a new problem. The current episode started today. The problem occurs constantly. The problem has been gradually worsening. The pain is located in the RUQ. The pain is at a severity of 8/10. The pain is moderate. The quality of the pain is burning, aching and sharp. The abdominal pain radiates to the back. Associated symptoms include nausea and vomiting. Pertinent negatives include no constipation, diarrhea, dysuria or fever. Nothing aggravates the pain. The pain is relieved by nothing. Treatments tried: zantac  The treatment provided no relief. There is no history of abdominal surgery.       Constitution: Negative for appetite change, chills, sweating and fever.   Cardiovascular: Negative for chest pain.   Respiratory: Negative for shortness of breath.    Gastrointestinal: Positive for abdominal pain, nausea and vomiting. Negative for abdominal trauma, abdominal bloating, history of abdominal surgery, constipation, diarrhea, dark colored stools and heartburn.   Genitourinary: Negative for dysuria " and pelvic pain.   Musculoskeletal: Negative for back pain.       Objective:      Physical Exam   Constitutional: He is oriented to person, place, and time. He appears well-developed and well-nourished. He appears distressed.   HENT:   Head: Normocephalic and atraumatic.   Right Ear: External ear normal.   Left Ear: External ear normal.   Nose: Nose normal.   Mouth/Throat: Mucous membranes are normal.   Eyes: Conjunctivae and lids are normal.   Neck: Trachea normal and full passive range of motion without pain. Neck supple.   Cardiovascular: Normal rate, regular rhythm and normal heart sounds.   Pulmonary/Chest: Effort normal and breath sounds normal. No stridor. No respiratory distress. He has no decreased breath sounds. He has no wheezes. He has no rhonchi. He has no rales.   Abdominal: Soft. Normal appearance and bowel sounds are normal. He exhibits no distension, no abdominal bruit, no pulsatile midline mass and no mass. There is tenderness in the right upper quadrant. There is guarding and positive Huertas's sign. There is no rigidity, no rebound, no CVA tenderness and no tenderness at McBurney's point.   Musculoskeletal: Normal range of motion. He exhibits no edema.   Neurological: He is alert and oriented to person, place, and time. He has normal strength.   Skin: Skin is warm, dry and intact. He is not diaphoretic. No pallor.   Psychiatric: He has a normal mood and affect. His speech is normal and behavior is normal. Judgment and thought content normal. Cognition and memory are normal.   Nursing note and vitals reviewed.      Assessment:       1. Right upper quadrant abdominal pain        Plan:         Right upper quadrant abdominal pain  -     Refer to Emergency Dept.    Given inability to image patient in urgent care or check a WBC, will refer to the ED for further workup, as he has acute abdomen on exam.   I spoke with Carolina, charge nurse at [facility] regarding patient's arrival.    Patient  Instructions     PLEASE READ YOUR DISCHARGE INSTRUCTIONS ENTIRELY AS IT CONTAINS IMPORTANT INFORMATION.  Please go to the ER now.  Do not eat or drink anything until you are evaluated.   - Rest.    - Drink plenty of fluids.    - Tylenol or Ibuprofen as directed as needed for fever/pain.    - Follow up with your PCP or specialty clinic as directed in the next 1-2 weeks if not improved or as needed.  You can call [phone] to schedule an appointment with the appropriate provider.    - If you were prescribed antibiotics, please take them to completion.  - If you were prescribed a narcotic medication, do not drive or operate heavy equipment or machinery while taking these medications.  - If you  smoke, please stop smoking.  -You must understand that you've received an Urgent Care treatment only and that you may be released before all your medical problems are known or treated. You, the patient, will    arrange for follow up care as instructed.  - Please return to Urgent Care or to the Emergency Department if your symptoms worsen.    Patient aware and verbalized understanding.    Abdominal Pain    Abdominal pain is pain in the stomach or belly area. Everyone has this pain from time to time. In many cases it goes away on its own. But abdominal pain can sometimes be due to a serious problem, such as appendicitis. So its important to know when to seek help.  Causes of abdominal pain  There are many possible causes of abdominal pain. Common causes in adults include:  · Constipation, diarrhea, or gas  · Stomach acid flowing back up into the esophagus (acid reflux or heartburn)  · Severe acid reflux, called GERD (gastroesophageal reflux disease)  · A sore in the lining of the stomach or small intestine (peptic ulcer)  · Inflammation of the gallbladder, liver, or pancreas  · Gallstones or kidney stones  · Appendicitis   · Intestinal blockage   · An internal organ pushing through a muscle or other tissue  (hernia)  · Urinary tract infections  · In women, menstrual cramps, fibroids, or endometriosis  · Inflammation or infection of the intestines  Diagnosing the cause of abdominal pain  Your healthcare provider will do a physical exam help find the cause of your pain. If needed, tests will be ordered. Belly pain has many possible causes. So it can be hard to find the reason for your pain. Giving details about your pain can help. Tell your provider where and when you feel the pain, and what makes it better or worse. Also let your provider know if you have other symptoms such as:  · Fever  · Tiredness  · Upset stomach (nausea)  · Vomiting  · Changes in bathroom habits  Treating abdominal pain  Some causes of pain need emergency medical treatment right away. These include appendicitis or a bowel blockage. Other problems can be treated with rest, fluids, or medicines. Your healthcare provider can give you specific instructions for treatment or self-care based on what is causing your pain.  If you have vomiting or diarrhea, sip water or other clear fluids. When you are ready to eat solid foods again, start with small amounts of easy-to-digest, low-fat foods. These include apple sauce, toast, or crackers.   When to seek medical care  Call 911 or go to the hospital right away if you:  · Cant pass stool and are vomiting  · Are vomiting blood or have bloody diarrhea or black, tarry diarrhea  · Have chest, neck, or shoulder pain  · Feel like you might pass out  · Have pain in your shoulder blades with nausea  · Have sudden, severe belly pain  · Have new, severe pain unlike any you have felt before  · Have a belly that is rigid, hard, and tender to touch  Call your healthcare provider if you have:  · Pain for more than 5 days  · Bloating for more than 2 days  · Diarrhea for more than 5 days  · A fever of 100.4°F (38.0°C) or higher, or as directed by your provider  · Pain that gets worse  · Weight loss for no reason  · Continued  lack of appetite  · Blood in your stool  How to prevent abdominal pain  Here are some tips to help prevent abdominal pain:  · Eat smaller amounts of food at one time.  · Avoid greasy, fried, or other high-fat foods.  · Avoid foods that give you gas.  · Exercise regularly.  · Drink plenty of fluids.  To help prevent GERD symptoms:  · Quit smoking.  · Reduce alcohol and certain foods that increase stomach acid.  · Avoid aspirin and over-the-counter pain and fever medicines (NSAIDS or nonsteroidal anti-inflammatory drugs), if possible  · Lose extra weight.  · Finish eating at least 2 hours before you go to bed or lie down.  · Raise the head of your bed.  Date Last Reviewed: 7/1/2016  © 1399-5272 [employer]. [address] [place] [address], [place] [address]. All rights reserved. This information is not intended as a substitute for professional medical care. Always follow your healthcare professional's instructions.

## 2019-10-02 NOTE — PROGRESS NOTES
Smoking cessation educaiton provided. Pt states that he started smoking at age 15 and that he smokes 1 pack of cigarettes per day on average. 21 mg nicotine patch ordered.  Handout for 1-800-QUIT-NOW smoking cessation program provided

## 2019-10-02 NOTE — H&P
Patient ID: Erik Mims is a 39 y.o. male.    Chief Complaint: Abdominal Pain (39y M ambulatory to ED with c/o RUQ epigastric pain since 1500. pt was seen at urgent care earlier today)      HPI:  39M with epigastric pain that began yesterday. Became severe and he went to urgent care. From there he was referred to ED. He vomited once in the ER. His pain slowly began to improve. Denies fevers but does have dark urine. No jaundice. No fevers.      Review of Systems   Constitutional: Negative for chills, diaphoresis and fever.   HENT: Negative for trouble swallowing.    Respiratory: Negative for cough, shortness of breath, wheezing and stridor.    Cardiovascular: Negative for chest pain and palpitations.   Gastrointestinal: Positive for abdominal pain, nausea and vomiting. Negative for abdominal distention, blood in stool and diarrhea.   Endocrine: Negative for cold intolerance and heat intolerance.   Genitourinary: Negative for difficulty urinating.   Musculoskeletal: Negative for back pain.   Skin: Negative for rash.   Allergic/Immunologic: Negative for immunocompromised state.   Neurological: Negative for dizziness, syncope and numbness.   Hematological: Negative for adenopathy.   Psychiatric/Behavioral: Negative for agitation.       Current Facility-Administered Medications   Medication Dose Route Frequency Provider Last Rate Last Dose    hydromorphone (PF) injection 1 mg  1 mg Intravenous Q4H PRN Isabelle Matthews MD        influenza (QUADRIVALENT PF) vaccine 0.5 mL  0.5 mL Intramuscular vaccine x 1 dose Dylan Licona MD        lactated ringers infusion   Intravenous Continuous Isabelle Matthews  mL/hr at 10/02/19 0358      morphine injection 2 mg  2 mg Intravenous Q4H PRN Isabelle Matthews MD        nicotine 21 mg/24 hr 1 patch  1 patch Transdermal ED 1 Time Isabelle Matthews MD   1 patch at 10/02/19 0304    ondansetron injection 4 mg  4 mg Intravenous Q8H PRN Isabelle Matthews MD         sodium chloride 0.9% flush 10 mL  10 mL Intravenous PRN Isabelle Matthews MD           Review of patient's allergies indicates:  No Known Allergies    Past Medical History:   Diagnosis Date    COPD (chronic obstructive pulmonary disease)     Hypertension     Syncope     Tobacco use 7/20/2017       Past Surgical History:   Procedure Laterality Date    LUNG SURGERY         No family history on file.    Social History     Socioeconomic History    Marital status: Single     Spouse name: Not on file    Number of children: Not on file    Years of education: Not on file    Highest education level: Not on file   Occupational History    Not on file   Social Needs    Financial resource strain: Not on file    Food insecurity:     Worry: Not on file     Inability: Not on file    Transportation needs:     Medical: Not on file     Non-medical: Not on file   Tobacco Use    Smoking status: Current Every Day Smoker     Packs/day: 1.00     Years: 25.00     Pack years: 25.00     Start date: 1994    Smokeless tobacco: Never Used    Tobacco comment: Handout provided for 1-800-QUIT-NOW smoking cessation program.    Substance and Sexual Activity    Alcohol use: Yes    Drug use: No    Sexual activity: Not on file   Lifestyle    Physical activity:     Days per week: Not on file     Minutes per session: Not on file    Stress: Not on file   Relationships    Social connections:     Talks on phone: Not on file     Gets together: Not on file     Attends Latter-day service: Not on file     Active member of club or organization: Not on file     Attends meetings of clubs or organizations: Not on file     Relationship status: Not on file   Other Topics Concern    Not on file   Social History Narrative    Not on file       Vitals:    10/02/19 0728   BP: (!) 143/97   Pulse: 72   Resp: 18   Temp: 96.9 °F (36.1 °C)       Physical Exam   Constitutional: He is oriented to person, place, and time. He appears well-nourished.  No distress.   HENT:   Head: Normocephalic and atraumatic.   Eyes: No scleral icterus.   Cardiovascular: Normal rate.   Pulmonary/Chest: Effort normal. No stridor. No respiratory distress.   Abdominal: Soft. There is no tenderness.   Lymphadenopathy:     He has no cervical adenopathy.   Neurological: He is alert and oriented to person, place, and time.   Skin: Skin is warm. No erythema.   Psychiatric: He has a normal mood and affect. His behavior is normal.     WBC 8.5  Tbili 2.6 up to 4.8 this morning  US shows gallstones, normal CBD  AST/ALT mildly elevated, up somewhat this morning    Assessment & Plan:   39M with choledocholithiasis  Seen by me over a year ago for gallstones, preferred to avoid surgery  To OR for lap mary with IOC, possible CBD exploration

## 2019-10-02 NOTE — PLAN OF CARE
Admit from ED,AAO,NPO, denies n/v/pain,IVF begun as per order,careplan reviewed,questions answered,safety and comfort maintained.

## 2019-10-02 NOTE — TRANSFER OF CARE
"Anesthesia Transfer of Care Note    Patient: Erik Mims    Procedure(s) Performed: Procedure(s) (LRB):  CHOLECYSTECTOMY, LAPAROSCOPIC, WITH CHOLANGIOGRAM (N/A)    Patient location: PACU    Anesthesia Type: general    Transport from OR: Transported from OR on 6-10 L/min O2 by face mask with adequate spontaneous ventilation    Post pain: adequate analgesia    Post assessment: no apparent anesthetic complications    Post vital signs: stable    Level of consciousness: sedated    Nausea/Vomiting: no nausea/vomiting    Complications: none    Transfer of care protocol was followed      Last vitals:   Visit Vitals  BP (!) 122/90   Pulse 84   Temp 36.6 °C (97.9 °F) (Skin)   Resp 20   Ht 6' 2" (1.88 m)   Wt 81 kg (178 lb 9.2 oz)   SpO2 100%   BMI 22.93 kg/m²     "

## 2019-10-03 NOTE — DISCHARGE SUMMARY
[facility]  General Surgery  Discharge Summary      Patient Name: Erik Mims  MRN: [id number]  Admission Date: 10/1/2019  Hospital Length of Stay: 1 days  Discharge Date and Time:  10/03/2019 3:03 PM  Attending Physician: No att. providers found   Discharging Provider: Osmani Burrell MD  Primary Care Provider: Benny Dang MD    HPI:   39M with epigastric pain that began yesterday. Became severe and he went to urgent care. From there he was referred to ED. He vomited once in the ER. His pain slowly began to improve. Denies fevers but does have dark urine. No jaundice. No fevers.    Procedure(s) (LRB):  CHOLECYSTECTOMY, LAPAROSCOPIC, WITH CHOLANGIOGRAM (N/A)      Indwelling Lines/Drains at time of discharge:   Lines/Drains/Airways     None               Hospital Course: Patient with choledocholithiasis underwent lap mary with intraoperative cholangiogram. Study showed contrast into duo. Did well post op and discharged once medically stable and had good PO pain control.     Consults:     Significant Diagnostic Studies: Labs:   CMP   Recent Labs   Lab 10/01/19  2203 10/02/19  0910    139   K 3.7 3.7   CL 99 106   CO2 24 25    92   BUN 12 9   CREATININE 1.0 0.8   CALCIUM 12.2* 9.9   PROT 7.9 6.9   ALBUMIN 4.8 4.2   BILITOT 2.6* 4.8*   ALKPHOS 73 80   * 387*   ALT 86* 188*   ANIONGAP 14 8   ESTGFRAFRICA >60 >60   EGFRNONAA >60 >60    and CBC   Recent Labs   Lab 10/01/19  2203   WBC 8.50   HGB 15.1   HCT 43.8          Pending Diagnostic Studies:     Procedure Component Value Units Date/Time    NM Hepatobiliary Scan (HIDA) [[id number]]     Order Status:  Sent Lab Status:  No result         Final Active Diagnoses:    Diagnosis Date Noted POA    PRINCIPAL PROBLEM:  Cholelithiasis with choledocholithiasis [K80.70] 10/02/2019 Yes    RUQ abdominal pain [R10.11] 10/02/2019 Yes      Problems Resolved During this Admission:      Discharged Condition:  good    Disposition: Home or Self Care    Follow Up:  Follow-up Information     Dylan Licona MD On 10/16/2019.    Specialties:  Surgery, General Surgery  Why:  For wound re-check    1:40 pm      Contact information:  [address]  [address]  [place] [address]  [phone]                 Patient Instructions:      Diet Adult Regular     Notify your health care provider if you experience any of the following:  temperature >100.4     Notify your health care provider if you experience any of the following:  persistent nausea and vomiting or diarrhea     Notify your health care provider if you experience any of the following:  severe uncontrolled pain     Notify your health care provider if you experience any of the following:  redness, tenderness, or signs of infection (pain, swelling, redness, odor or green/yellow discharge around incision site)     Notify your health care provider if you experience any of the following:  difficulty breathing or increased cough     Notify your health care provider if you experience any of the following:  severe persistent headache     Notify your health care provider if you experience any of the following:  worsening rash     Notify your health care provider if you experience any of the following:  persistent dizziness, light-headedness, or visual disturbances     Notify your health care provider if you experience any of the following:  increased confusion or weakness     Activity as tolerated   Order Comments: No lifting greater than 10 pounds for 5 weeks from day of surgery.  No pushing/pulling such as vacuuming or raking.  No straining, avoid constipation and take stool softeners as described and laxatives as needed.  No driving while on narcotics and until you can react quickly without pain.     Shower on day dressing removed (No bath)   Order Comments: You can shower in 24. In the shower, it is okay to let warm soapy water rinse over your wound. Do not submerge your  wound in any tubs, baths, spas, or pools. Over your wound you have surgical glue. This will fall off on its own.     Medications:  Reconciled Home Medications:      Medication List      START taking these medications    HYDROcodone-acetaminophen  mg per tablet  Commonly known as:  NORCO  Take 1 tablet by mouth every 6 (six) hours as needed for Pain.        CONTINUE taking these medications    DULoxetine 60 MG capsule  Commonly known as:  CYMBALTA  Take 1 capsule (60 mg total) by mouth once daily. Further refills require visit with Dr. Dang     ipratropium-albuterol  mcg/actuation inhaler  Commonly known as:  COMBIVENT  Inhale 1 puff into the lungs every 4 (four) hours as needed for Wheezing. Rescue     lisinopril-hydrochlorothiazide 20-12.5 mg per tablet  Commonly known as:  PRINZIDE,ZESTORETIC  Take 1 tablet by mouth once daily. Further refills require visit with Dr. Dang     methocarbamol 750 MG Tab  Commonly known as:  ROBAXIN  TAKE 1 TABLET(750 MG) BY MOUTH THREE TIMES DAILY     nitroGLYCERIN 0.4 MG SL tablet  Commonly known as:  NITROSTAT  Place 1 tablet (0.4 mg total) under the tongue every 5 (five) minutes as needed for Chest pain.     rOPINIRole 0.25 MG tablet  Commonly known as:  REQUIP  Take 1 tablet (0.25 mg total) by mouth nightly as needed. Further refills require visit with Dr. Dang     tiotropium 18 mcg inhalation capsule  Commonly known as:  SPIRIVA  Inhale 1 capsule (18 mcg total) into the lungs once daily. Controller          Time spent on the discharge of patient: 15 minutes    Osmani Burrell MD  General Surgery  [facility]

## 2019-10-03 NOTE — HOSPITAL COURSE
Patient with choledocholithiasis underwent lap mary with intraoperative cholangiogram. Study showed contrast into duo. Did well post op and discharged once medically stable and had good PO pain control.

## 2019-10-07 ENCOUNTER — DOCUMENTATION ONLY (OUTPATIENT)
Dept: REHABILITATION | Facility: HOSPITAL | Age: 39
End: 2019-10-07

## 2019-10-07 PROBLEM — M54.40 ACUTE BILATERAL LOW BACK PAIN WITH SCIATICA: Status: RESOLVED | Noted: 2019-07-31 | Resolved: 2019-10-07

## 2019-10-07 NOTE — PROGRESS NOTES
Mr. Mims came to PT for a total of 2 visits (initial evaluation and 1 follow-up) for acute HNP.  Tolerated sessions well.  Emphasis on lumbar repeated extension.  Eager to return to work.  Did not make any remaining follow-up appts.  Discharging from PT at this time.     TRICIA MolinaT

## 2019-10-08 ENCOUNTER — TELEPHONE (OUTPATIENT)
Dept: URGENT CARE | Facility: CLINIC | Age: 39
End: 2019-10-08

## 2019-10-09 ENCOUNTER — PATIENT MESSAGE (OUTPATIENT)
Dept: SURGERY | Facility: CLINIC | Age: 39
End: 2019-10-09

## 2019-10-16 ENCOUNTER — OFFICE VISIT (OUTPATIENT)
Dept: SURGERY | Facility: CLINIC | Age: 39
End: 2019-10-16
Payer: COMMERCIAL

## 2019-10-16 VITALS
SYSTOLIC BLOOD PRESSURE: 149 MMHG | TEMPERATURE: 99 F | HEIGHT: 74 IN | BODY MASS INDEX: 23.73 KG/M2 | WEIGHT: 184.88 LBS | DIASTOLIC BLOOD PRESSURE: 92 MMHG | HEART RATE: 86 BPM

## 2019-10-16 DIAGNOSIS — Z90.49 S/P LAPAROSCOPIC CHOLECYSTECTOMY: Primary | ICD-10-CM

## 2019-10-16 PROCEDURE — 99999 PR PBB SHADOW E&M-EST. PATIENT-LVL III: ICD-10-PCS | Mod: PBBFAC,,, | Performed by: STUDENT IN AN ORGANIZED HEALTH CARE EDUCATION/TRAINING PROGRAM

## 2019-10-16 PROCEDURE — 99999 PR PBB SHADOW E&M-EST. PATIENT-LVL III: CPT | Mod: PBBFAC,,, | Performed by: STUDENT IN AN ORGANIZED HEALTH CARE EDUCATION/TRAINING PROGRAM

## 2019-10-16 PROCEDURE — 99024 POSTOP FOLLOW-UP VISIT: CPT | Mod: S$GLB,,, | Performed by: STUDENT IN AN ORGANIZED HEALTH CARE EDUCATION/TRAINING PROGRAM

## 2019-10-16 PROCEDURE — 99024 PR POST-OP FOLLOW-UP VISIT: ICD-10-PCS | Mod: S$GLB,,, | Performed by: STUDENT IN AN ORGANIZED HEALTH CARE EDUCATION/TRAINING PROGRAM

## 2019-10-16 NOTE — PROGRESS NOTES
Subjective:  39-year-old male with choledocholithiasis s/p lap mary with IOC 10/02/19 here for post op visit. Doing well. Getting back to regular activities. Tolerating diet. Moving bowels. Urinating. Minor pain not requiring pain meds. Denies fevers, chills, or any other issues.     Objective:  Vitals:    10/16/19 1352   BP: (!) 149/92   Pulse: 86   Temp: 99.2 °F (37.3 °C)     Abdomen: incisions clean, dry, and intact. Non-tender.     FINAL PATHOLOGIC DIAGNOSIS  1. Gallbladder, cholecystectomy:  - Chronic cholecystitis with cholelithiasis  - Attached benign reactive lymph node  - Negative for dysplasia or malignancy    Assessment and Plan:  39-year-old male s/p lap mary. Doing well. No issues.   -return to clinic as needed.     Osmani Burrell, PGY2  General Surgery  [phone]

## 2019-10-16 NOTE — LETTER
October 16, 2019      [facility]  [address], [address]  [place] [address]  Phone: [phone]       Patient: Erik Mims   YOB: 1980  Date of Visit: 10/16/2019    To Whom It May Concern:    Erik Mims  was at [facility] on 10/16/2019.  He may return to work on Thursday, Oct 17 with light duty restrictions. No climbing, lifting, pushing, pulling, carrying or any other exertion greater than 20 pounds until Monday, Oct 28.  If you have any questions or concerns, or if I can be of further assistance, please do not hesitate to contact me.    Sincerely,      MD Boogie Wilkes LPN

## 2019-12-13 DIAGNOSIS — F41.9 ANXIETY: ICD-10-CM

## 2019-12-13 DIAGNOSIS — I10 ESSENTIAL HYPERTENSION: ICD-10-CM

## 2019-12-13 RX ORDER — LISINOPRIL AND HYDROCHLOROTHIAZIDE 12.5; 2 MG/1; MG/1
TABLET ORAL
Qty: 90 TABLET | Refills: 0 | Status: SHIPPED | OUTPATIENT
Start: 2019-12-13 | End: 2020-03-03

## 2019-12-13 RX ORDER — DULOXETIN HYDROCHLORIDE 60 MG/1
CAPSULE, DELAYED RELEASE ORAL
Qty: 90 CAPSULE | Refills: 0 | Status: SHIPPED | OUTPATIENT
Start: 2019-12-13 | End: 2020-03-03

## 2020-02-10 PROBLEM — M54.6 THORACIC SPINE PAIN: Status: RESOLVED | Noted: 2019-05-19 | Resolved: 2020-02-10

## 2020-02-12 DIAGNOSIS — J44.9 CHRONIC OBSTRUCTIVE PULMONARY DISEASE, UNSPECIFIED COPD TYPE: ICD-10-CM

## 2020-02-13 ENCOUNTER — TELEPHONE (OUTPATIENT)
Dept: INTERNAL MEDICINE | Facility: CLINIC | Age: 40
End: 2020-02-13

## 2020-02-13 RX ORDER — TIOTROPIUM BROMIDE 18 UG/1
18 CAPSULE ORAL; RESPIRATORY (INHALATION) DAILY
Qty: 90 CAPSULE | Refills: 0 | Status: SHIPPED | OUTPATIENT
Start: 2020-02-13 | End: 2020-02-13

## 2020-02-13 NOTE — TELEPHONE ENCOUNTER
----- Message from Ailyn Dunn MA sent at 2/13/2020  3:51 PM CST -----      ----- Message -----  From: Riddhi Johnston  Sent: 2/13/2020   3:45 PM CST  To: [facility] Staff    Can we please change to incruse for patient, insurance will not cover spriva for this patient

## 2020-03-03 DIAGNOSIS — I10 ESSENTIAL HYPERTENSION: ICD-10-CM

## 2020-03-03 DIAGNOSIS — F41.9 ANXIETY: ICD-10-CM

## 2020-03-03 RX ORDER — DULOXETIN HYDROCHLORIDE 60 MG/1
CAPSULE, DELAYED RELEASE ORAL
Qty: 90 CAPSULE | Refills: 0 | Status: SHIPPED | OUTPATIENT
Start: 2020-03-03 | End: 2020-03-06

## 2020-03-03 RX ORDER — LISINOPRIL AND HYDROCHLOROTHIAZIDE 12.5; 2 MG/1; MG/1
TABLET ORAL
Qty: 90 TABLET | Refills: 0 | Status: SHIPPED | OUTPATIENT
Start: 2020-03-03 | End: 2020-03-06

## 2020-03-06 DIAGNOSIS — F41.9 ANXIETY: ICD-10-CM

## 2020-03-06 DIAGNOSIS — I10 ESSENTIAL HYPERTENSION: ICD-10-CM

## 2020-03-06 RX ORDER — LISINOPRIL AND HYDROCHLOROTHIAZIDE 12.5; 2 MG/1; MG/1
TABLET ORAL
Qty: 90 TABLET | Refills: 0 | Status: SHIPPED | OUTPATIENT
Start: 2020-03-06 | End: 2020-07-01

## 2020-03-06 RX ORDER — DULOXETIN HYDROCHLORIDE 60 MG/1
CAPSULE, DELAYED RELEASE ORAL
Qty: 90 CAPSULE | Refills: 0 | Status: SHIPPED | OUTPATIENT
Start: 2020-03-06 | End: 2020-03-11

## 2020-03-10 DIAGNOSIS — F41.9 ANXIETY: ICD-10-CM

## 2020-03-11 RX ORDER — DULOXETIN HYDROCHLORIDE 60 MG/1
CAPSULE, DELAYED RELEASE ORAL
Qty: 90 CAPSULE | Refills: 0 | Status: SHIPPED | OUTPATIENT
Start: 2020-03-11 | End: 2020-07-01

## 2020-05-07 ENCOUNTER — TELEPHONE (OUTPATIENT)
Dept: INTERNAL MEDICINE | Facility: CLINIC | Age: 40
End: 2020-05-07

## 2020-06-04 DIAGNOSIS — J44.9 CHRONIC OBSTRUCTIVE PULMONARY DISEASE, UNSPECIFIED COPD TYPE: ICD-10-CM

## 2020-06-04 RX ORDER — TIOTROPIUM BROMIDE 18 UG/1
CAPSULE ORAL; RESPIRATORY (INHALATION)
Qty: 90 CAPSULE | Refills: 0 | OUTPATIENT
Start: 2020-06-04

## 2020-09-27 ENCOUNTER — OFFICE VISIT (OUTPATIENT)
Dept: URGENT CARE | Facility: CLINIC | Age: 40
End: 2020-09-27
Payer: COMMERCIAL

## 2020-09-27 VITALS
OXYGEN SATURATION: 97 % | BODY MASS INDEX: 23.61 KG/M2 | SYSTOLIC BLOOD PRESSURE: 144 MMHG | RESPIRATION RATE: 17 BRPM | DIASTOLIC BLOOD PRESSURE: 94 MMHG | WEIGHT: 184 LBS | HEIGHT: 74 IN | TEMPERATURE: 98 F | HEART RATE: 62 BPM

## 2020-09-27 DIAGNOSIS — M54.9 BACK PAIN, UNSPECIFIED BACK LOCATION, UNSPECIFIED BACK PAIN LATERALITY, UNSPECIFIED CHRONICITY: Primary | ICD-10-CM

## 2020-09-27 DIAGNOSIS — M47.816 SPONDYLOSIS OF LUMBAR SPINE: ICD-10-CM

## 2020-09-27 PROCEDURE — 99213 PR OFFICE/OUTPT VISIT, EST, LEVL III, 20-29 MIN: ICD-10-PCS | Mod: 25,S$GLB,, | Performed by: FAMILY MEDICINE

## 2020-09-27 PROCEDURE — 96372 THER/PROPH/DIAG INJ SC/IM: CPT | Mod: S$GLB,,, | Performed by: FAMILY MEDICINE

## 2020-09-27 PROCEDURE — 99213 OFFICE O/P EST LOW 20 MIN: CPT | Mod: 25,S$GLB,, | Performed by: FAMILY MEDICINE

## 2020-09-27 PROCEDURE — 96372 PR INJECTION,THERAP/PROPH/DIAG2ST, IM OR SUBCUT: ICD-10-PCS | Mod: S$GLB,,, | Performed by: FAMILY MEDICINE

## 2020-09-27 RX ORDER — PREDNISONE 10 MG/1
TABLET ORAL
Qty: 30 TABLET | Refills: 0 | Status: SHIPPED | OUTPATIENT
Start: 2020-09-27 | End: 2020-11-18

## 2020-09-27 RX ORDER — KETOROLAC TROMETHAMINE 30 MG/ML
60 INJECTION, SOLUTION INTRAMUSCULAR; INTRAVENOUS
Status: COMPLETED | OUTPATIENT
Start: 2020-09-27 | End: 2020-09-27

## 2020-09-27 RX ORDER — KETOROLAC TROMETHAMINE 10 MG/1
TABLET, FILM COATED ORAL
Qty: 20 TABLET | Refills: 0 | Status: SHIPPED | OUTPATIENT
Start: 2020-09-27 | End: 2020-10-01 | Stop reason: SDUPTHER

## 2020-09-27 RX ORDER — CYCLOBENZAPRINE HCL 10 MG
10 TABLET ORAL 3 TIMES DAILY PRN
Qty: 30 TABLET | Refills: 0 | Status: SHIPPED | OUTPATIENT
Start: 2020-09-27 | End: 2020-10-01 | Stop reason: SDUPTHER

## 2020-09-27 RX ADMIN — KETOROLAC TROMETHAMINE 60 MG: 30 INJECTION, SOLUTION INTRAMUSCULAR; INTRAVENOUS at 11:09

## 2020-09-27 NOTE — PATIENT INSTRUCTIONS
Back Pain (Acute or Chronic)    Back pain is one of the most common problems. The good news is that most people feel better in 1 to 2 weeks, and most of the rest in 1 to 2 months. Most people can remain active.  People experience and describe pain differently; not everyone is the same.  · The pain can be sharp, stabbing, shooting, aching, cramping or burning.  · Movement, standing, bending, lifting, sitting, or walking may worsen pain.  · It can be localized to one spot or area, or it can be more generalized.  · It can spread or radiate upwards, to the front, or go down your arms or legs (sciatica).  · It can cause muscle spasm.  Most of the time, mechanical problems with the muscles or spine cause the pain. Mechanical problems are usually caused by an injury to the muscles or ligaments. While illness can cause back pain, it is usually not caused by a serious illness. Mechanical problems include:   · Physical activity such as sports, exercise, work, or normal activity  · Overexertion, lifting, pushing, pulling incorrectly or too aggressively  · Sudden twisting, bending, or stretching from an accident, or accidental movement  · Poor posture  · Stretching or moving wrong, without noticing pain at the time  · Poor coordination, lack of regular exercise (check with your doctor about this)  · Spinal disc disease or arthritis  · Stress  Pain can also be related to pregnancy, or illness like appendicitis, bladder or kidney infections, pelvic infections, and many other things.  Acute back pain usually gets better in 1 to 2 weeks. Back pain related to disk disease, arthritis in the spinal joints or spinal stenosis (narrowing of the spinal canal) can become chronic and last for months or years.  Unless you had a physical injury (for example, a car accident or fall) X-rays are usually not needed for the initial evaluation of back pain. If pain continues and does not respond to medical treatment, X-rays and other tests may be  needed.  Home care  Try these home care recommendations:  · When in bed, try to find a position of comfort. A firm mattress is best. Try lying flat on your back with pillows under your knees. You can also try lying on your side with your knees bent up towards your chest and a pillow between your knees.  · At first, do not try to stretch out the sore spots. If there is a strain, it is not like the good soreness you get after exercising without an injury. In this case, stretching may make it worse.  · Avoid prolong sitting, long car rides, or travel. This puts more stress on the lower back than standing or walking.  · During the first 24 to 72 hours after an acute injury or flare up of chronic back pain, apply an ice pack to the painful area for 20 minutes and then remove it for 20 minutes. Do this over a period of 60 to 90 minutes or several times a day. This will reduce swelling and pain. Wrap the ice pack in a thin towel or plastic to protect your skin.  · You can start with ice, then switch to heat. Heat (hot shower, hot bath, or heating pad) reduces pain and works well for muscle spasms. Heat can be applied to the painful area for 20 minutes then remove it for 20 minutes. Do this over a period of 60 to 90 minutes or several times a day. Do not sleep on a heating pad. It can lead to skin burns or tissue damage.  · You can alternate ice and heat therapy. Talk with your doctor about the best treatment for your back pain.  · Therapeutic massage can help relax the back muscles without stretching them.  · Be aware of safe lifting methods and do not lift anything without stretching first.  Medicines  Talk to your doctor before using medicine, especially if you have other medical problems or are taking other medicines.  · You may use over-the-counter medicine as directed on the bottle to control pain, unless another pain medicine was prescribed. If you have chronic conditions like diabetes, liver or kidney disease,  stomach ulcers, or gastrointestinal bleeding, or are taking blood thinners, talk to your doctor before taking any medicine.  · Be careful if you are given a prescription medicines, narcotics, or medicine for muscle spasms. They can cause drowsiness, affect your coordination, reflexes, and judgement. Do not drive or operate heavy machinery.  Follow-up care  Follow up with your healthcare provider, or as advised.   A radiologist will review any X-rays that were taken. Your provide will notify you of any new findings that may affect your care.  Call 911  Call emergency services if any of the following occur:  · Trouble breathing  · Confusion  · Very drowsy or trouble awakening  · Fainting or loss of consciousness  · Rapid or very slow heart rate  · Loss of bowel or bladder control  When to seek medical advice  Call your healthcare provider right away if any of these occur:   · Pain becomes worse or spreads to your legs  · Weakness or numbness in one or both legs  · Numbness in the groin or genital area  Date Last Reviewed: 7/1/2016  © 3637-2907 [employer]. [address] [place] [address], [place] [address]. All rights reserved. This information is not intended as a substitute for professional medical care. Always follow your healthcare professional's instructions.

## 2020-09-27 NOTE — PROGRESS NOTES
"Subjective:       Patient ID: Erik Mims is a 40 y.o. male.    Vitals:  height is 6' 2" (1.88 m) and weight is 83.5 kg (184 lb). His temperature is 97.9 °F (36.6 °C). His blood pressure is 144/94 (abnormal) and his pulse is 62. His respiration is 17 and oxygen saturation is 97%.     Chief Complaint: Back Pain    Patient states his back pain began Wed-Thursday. He went off shore for work, returned home and the pain became constantly worse. Pain is located in his lower back hip area and left leg. He asked that you review his radiology in his file.  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 The current episode started in the past 7 days (Wed-Thurs). The problem occurs constantly. The problem has been gradually worsening since onset. The quality of the pain is described as aching, burning, cramping, shooting and stabbing. The pain radiates to the left foot, left thigh and left knee. The pain is at a severity of 7/10. The pain is severe. Stiffness is present all day. Associated symptoms include numbness. Pertinent negatives include no abdominal pain, bladder incontinence, bowel incontinence or dysuria.       Constitution: Negative for fatigue.   Gastrointestinal: Negative for abdominal pain and bowel incontinence.   Genitourinary: Negative for dysuria, urgency, bladder incontinence and hematuria.   Musculoskeletal: Positive for back pain and muscle cramps. Negative for history of spine disorder.   Skin: Negative " for rash.   Neurological: Positive for numbness and tingling. Negative for coordination disturbances.       Objective:      Physical Exam   Constitutional: He is oriented to person, place, and time. He appears well-developed. He is cooperative.  Non-toxic appearance. He does not appear ill. No distress.   HENT:   Head: Normocephalic and atraumatic.   Ears:   Right Ear: Hearing, tympanic membrane, external ear and ear canal normal.   Left Ear: Hearing, tympanic membrane, external ear and ear canal normal.   Nose: Nose normal. No mucosal edema, rhinorrhea or nasal deformity. No epistaxis. Right sinus exhibits no maxillary sinus tenderness and no frontal sinus tenderness. Left sinus exhibits no maxillary sinus tenderness and no frontal sinus tenderness.   Mouth/Throat: Uvula is midline, oropharynx is clear and moist and mucous membranes are normal. No trismus in the jaw. Normal dentition. No uvula swelling. No posterior oropharyngeal erythema.   Eyes: Conjunctivae and lids are normal. Right eye exhibits no discharge. Left eye exhibits no discharge. No scleral icterus.   Neck: Trachea normal, normal range of motion, full passive range of motion without pain and phonation normal. Neck supple.   Cardiovascular: Normal rate, regular rhythm, normal heart sounds and normal pulses.   Pulmonary/Chest: Effort normal and breath sounds normal. No respiratory distress.   Abdominal: Soft. Normal appearance and bowel sounds are normal. He exhibits no distension, no pulsatile midline mass and no mass. There is no abdominal tenderness.   Musculoskeletal: Normal range of motion.         General: No deformity.      Comments: Back is mild tenderness to palpation flexion is normal but extension and straightening of the back with increased tenderness straight leg raise exam is negative   Neurological: He is alert and oriented to person, place, and time. He exhibits normal muscle tone. Coordination normal.   Skin: Skin is warm, dry, intact,  not diaphoretic and not pale. Psychiatric: His speech is normal and behavior is normal. Judgment and thought content normal.   Nursing note and vitals reviewed.        Assessment:       1. Back pain, unspecified back location, unspecified back pain laterality, unspecified chronicity    2. Spondylosis of lumbar spine        Plan:         Back pain, unspecified back location, unspecified back pain laterality, unspecified chronicity    Spondylosis of lumbar spine    Other orders  -     ketorolac injection 60 mg  -     ketorolac (TORADOL) 10 mg tablet; Take one po q 8 hrs prn pain  Dispense: 20 tablet; Refill: 0  -     cyclobenzaprine (FLEXERIL) 10 MG tablet; Take 1 tablet (10 mg total) by mouth 3 (three) times daily as needed for Muscle spasms.  Dispense: 30 tablet; Refill: 0  -     predniSONE (DELTASONE) 10 MG tablet; Take 2 po bid x 3 days, one bid x 3 days, then one daily till finish  Dispense: 30 tablet; Refill: 0         Patient advised to follow-up with the is PCP/orthopedist

## 2020-09-28 ENCOUNTER — PATIENT MESSAGE (OUTPATIENT)
Dept: PAIN MEDICINE | Facility: CLINIC | Age: 40
End: 2020-09-28

## 2020-09-29 ENCOUNTER — TELEPHONE (OUTPATIENT)
Dept: PAIN MEDICINE | Facility: CLINIC | Age: 40
End: 2020-09-29

## 2020-09-29 NOTE — TELEPHONE ENCOUNTER
Staff contacted and spoke with patient regarding his/her schedule 9/30/20 appointment with Pain Provider Dr. Farrah Leon MD    unfortunately physician will be out of clinic due felling ill. Staff would like for patient to contact clinic at [phone] to reschedule canceled appointment to provider next available date & time or if patient wishes to see a NP they can be schedule with them at their next available opening.       Pt verbalized understanding and reschedule appointment to [facility] schedule

## 2020-10-01 ENCOUNTER — OFFICE VISIT (OUTPATIENT)
Dept: PAIN MEDICINE | Facility: CLINIC | Age: 40
End: 2020-10-01
Attending: ANESTHESIOLOGY
Payer: COMMERCIAL

## 2020-10-01 VITALS
HEIGHT: 74 IN | SYSTOLIC BLOOD PRESSURE: 126 MMHG | BODY MASS INDEX: 21.3 KG/M2 | DIASTOLIC BLOOD PRESSURE: 78 MMHG | WEIGHT: 166 LBS | HEART RATE: 116 BPM | TEMPERATURE: 99 F

## 2020-10-01 DIAGNOSIS — M62.838 MUSCLE SPASM: ICD-10-CM

## 2020-10-01 DIAGNOSIS — M47.816 LUMBAR SPONDYLOSIS: ICD-10-CM

## 2020-10-01 DIAGNOSIS — M54.16 LUMBAR RADICULOPATHY: Primary | ICD-10-CM

## 2020-10-01 DIAGNOSIS — M51.36 DDD (DEGENERATIVE DISC DISEASE), LUMBAR: ICD-10-CM

## 2020-10-01 PROCEDURE — 99213 OFFICE O/P EST LOW 20 MIN: CPT | Mod: S$GLB,,, | Performed by: NURSE PRACTITIONER

## 2020-10-01 PROCEDURE — 3008F PR BODY MASS INDEX (BMI) DOCUMENTED: ICD-10-PCS | Mod: CPTII,S$GLB,, | Performed by: NURSE PRACTITIONER

## 2020-10-01 PROCEDURE — 3074F SYST BP LT 130 MM HG: CPT | Mod: CPTII,S$GLB,, | Performed by: NURSE PRACTITIONER

## 2020-10-01 PROCEDURE — 3074F PR MOST RECENT SYSTOLIC BLOOD PRESSURE < 130 MM HG: ICD-10-PCS | Mod: CPTII,S$GLB,, | Performed by: NURSE PRACTITIONER

## 2020-10-01 PROCEDURE — 99999 PR PBB SHADOW E&M-EST. PATIENT-LVL III: ICD-10-PCS | Mod: PBBFAC,,, | Performed by: NURSE PRACTITIONER

## 2020-10-01 PROCEDURE — 99213 PR OFFICE/OUTPT VISIT, EST, LEVL III, 20-29 MIN: ICD-10-PCS | Mod: S$GLB,,, | Performed by: NURSE PRACTITIONER

## 2020-10-01 PROCEDURE — 3008F BODY MASS INDEX DOCD: CPT | Mod: CPTII,S$GLB,, | Performed by: NURSE PRACTITIONER

## 2020-10-01 PROCEDURE — 3078F PR MOST RECENT DIASTOLIC BLOOD PRESSURE < 80 MM HG: ICD-10-PCS | Mod: CPTII,S$GLB,, | Performed by: NURSE PRACTITIONER

## 2020-10-01 PROCEDURE — 3078F DIAST BP <80 MM HG: CPT | Mod: CPTII,S$GLB,, | Performed by: NURSE PRACTITIONER

## 2020-10-01 PROCEDURE — 99999 PR PBB SHADOW E&M-EST. PATIENT-LVL III: CPT | Mod: PBBFAC,,, | Performed by: NURSE PRACTITIONER

## 2020-10-01 RX ORDER — CYCLOBENZAPRINE HCL 10 MG
10 TABLET ORAL 3 TIMES DAILY PRN
Qty: 30 TABLET | Refills: 0 | Status: SHIPPED | OUTPATIENT
Start: 2020-10-01 | End: 2020-10-08 | Stop reason: SDUPTHER

## 2020-10-01 RX ORDER — KETOROLAC TROMETHAMINE 10 MG/1
TABLET, FILM COATED ORAL
Qty: 30 TABLET | Refills: 0 | Status: SHIPPED | OUTPATIENT
Start: 2020-10-01 | End: 2020-10-08 | Stop reason: SDUPTHER

## 2020-10-01 NOTE — H&P (VIEW-ONLY)
Chronic patient Established Note (Follow up visit)      SUBJECTIVE:    Interval History 10/1/2020:  The patient returns to clinic today for follow up of low back pain. He reports increased low back pain that radiates into the posterolateral aspect of his left leg to his knee. He reports intermittent radiating pain to the top of his left foot. He describes this pain as shooting and tingling. He denies any right leg pain. His pain is worse with activity. He did go to Urgent Care recently where he was given Toradol, Flexeril, and a Medrol pack. He did not take the medrol pack. He does report some benefit with Toradol and Flexeril. He denies any weakness, bowel or bladder incontinence. He denies any other health changes. His pain today is 8/10.    Interval History 8/14/2019:  Erik Mims presents to the clinic for a follow-up appointment for low back pain. He reports that his low back and leg pain is much improved since last visit. He reports intermittent low back pain that is aching in nature. He reports intermittent radiating pain down the posterior aspect of his thighs. He has completed 2 physical therapy sessions at this time. He continues to take Robaxin as needed. He denies any other health changes. He denies any bowel or bladder incontinence. Since the last visit, Erik Mims states the pain has been improving. Current pain intensity is 3/10.    Pain Disability Index Review:  Last 3 PDI Scores 10/1/2020 8/14/2019 7/17/2019   Pain Disability Index (PDI) 40 19 27       Pain Medications:  Toradol   Flexeril    Opioid Contract: no     report:  Reviewed and consistent with medication use as prescribed.    Pain Procedures:   None    Physical Therapy/Home Exercise: yes    Imaging:   MRI Lumbar Spine 7/10/2019:  COMPARISON:  Lumbar spine radiograph dated 07/09/2019    FINDINGS:  Lumbar spine vertebral body heights and alignment are maintained.  No marrow replacing process.  Degenerative endplate  changes and mild edema at L5-S1.  Spinal cord terminus lies at L1-L2.  Prevertebral and paraspinal soft tissues are unremarkable.    T12-L1: No significant posterior disc herniation, spinal canal stenosis, or neural foraminal impingement.    L1-L2: No significant posterior disc herniation, spinal canal stenosis, or neural foraminal impingement.    L2-L3: Flavum thickening with mild facet DJD.  No significant neural foraminal narrowing or spinal canal stenosis.    L3-L4: Flavum thickening with mild facet DJD.  No significant neural foraminal narrowing or spinal canal stenosis.    L4-L5: There is disc desiccation with circumferential bulge and superimposed central protrusion resulting in partial collapse of the anterior thecal sac.  This in combination with flavum thickening and facet DJD results in overall mild central canal stenosis with lateral recess narrowing and probable contact of the transiting left L5 nerve root.  No significant neural foraminal impingement.    L5-S1: There is disc desiccation with height loss and posterior annular fissure.  Circumferential bulge with small central protrusion without significant spinal canal stenosis or neural foraminal impingement.  Bilateral facet DJD with trace effusions.      Impression       Lower lumbar spondylosis resulting in mild central canal stenosis at L4-L5 with lateral recess narrowing and probable contact of the left transiting L5 nerve root.    Trace bilateral facet effusions at L5-S1.     Xray Lumbar Spine 7/9/2019:  FINDINGS:  Mild lumbar scoliosis.  The lumbosacral disc space is narrowed.  The other disc spaces are well maintained.  No fracture, spondylolisthesis or bone destruction identified      Impression       See above         Allergies: Review of patient's allergies indicates:  No Known Allergies    Current Medications:   Current Outpatient Medications   Medication Sig Dispense Refill    cyclobenzaprine (FLEXERIL) 10 MG tablet Take 1 tablet (10 mg  total) by mouth 3 (three) times daily as needed for Muscle spasms. 30 tablet 0    DULoxetine (CYMBALTA) 60 MG capsule TAKE 1 CAPSULE BY MOUTH DAILY 90 capsule 0    HYDROcodone-acetaminophen (NORCO)  mg per tablet Take 1 tablet by mouth every 6 (six) hours as needed for Pain. 21 tablet 0    ketorolac (TORADOL) 10 mg tablet Take one po q 8 hrs prn pain 20 tablet 0    lisinopriL-hydrochlorothiazide (PRINZIDE,ZESTORETIC) 20-12.5 mg per tablet TAKE 1 TABLET BY MOUTH DAILY 90 tablet 0    methocarbamol (ROBAXIN) 750 MG Tab TAKE 1 TABLET(750 MG) BY MOUTH THREE TIMES DAILY 90 tablet 0    predniSONE (DELTASONE) 10 MG tablet Take 2 po bid x 3 days, one bid x 3 days, then one daily till finish 30 tablet 0    ipratropium-albuterol (COMBIVENT)  mcg/actuation inhaler Inhale 1 puff into the lungs every 4 (four) hours as needed for Wheezing. Rescue (Patient not taking: Reported on 10/1/2020) 4 g 3    nitroGLYCERIN (NITROSTAT) 0.4 MG SL tablet Place 1 tablet (0.4 mg total) under the tongue every 5 (five) minutes as needed for Chest pain. 20 tablet 12    rOPINIRole (REQUIP) 0.25 MG tablet Take 1 tablet (0.25 mg total) by mouth nightly as needed. Further refills require visit with Dr. Dang 90 tablet 0    umeclidinium (INCRUSE ELLIPTA) 62.5 mcg/actuation inhalation capsule Inhale 1 capsule (63 mcg total) into the lungs once daily. Controller (Patient not taking: Reported on 9/27/2020) 90 each 0     No current facility-administered medications for this visit.        REVIEW OF SYSTEMS:    GENERAL:  No weight loss, malaise or fevers.  HEENT:  Negative for frequent or significant headaches.  NECK:  Negative for lumps, goiter, pain and significant neck swelling.  RESPIRATORY:  Negative for cough, wheezing or shortness of breath. COPD  CARDIOVASCULAR:  Negative for chest pain, leg swelling or palpitations. HTN  GI:  Negative for abdominal discomfort, blood in stools or black stools or change in bowel  habits.  MUSCULOSKELETAL:  See HPI.  SKIN:  Negative for lesions, rash, and itching.  PSYCH:  Negative for sleep disturbance, mood disorder and recent psychosocial stressors.  HEMATOLOGY/LYMPHOLOGY:  Negative for prolonged bleeding, bruising easily or swollen nodes.  NEURO:   No history of headaches, syncope, paralysis, seizures or tremors.  All other reviewed and negative other than HPI.    Past Medical History:  Past Medical History:   Diagnosis Date    COPD (chronic obstructive pulmonary disease)     Hypertension     Syncope     Tobacco use 7/20/2017       Past Surgical History:  Past Surgical History:   Procedure Laterality Date    LAPAROSCOPIC CHOLECYSTECTOMY WITH CHOLANGIOGRAPHY N/A 10/2/2019    Procedure: CHOLECYSTECTOMY, LAPAROSCOPIC, WITH CHOLANGIOGRAM;  Surgeon: Dylan Licona MD;  Location: [facility] OR;  Service: General;  Laterality: N/A;    LUNG SURGERY         Family History:  No family history on file.    Social History:  Social History     Socioeconomic History    Marital status: Single     Spouse name: Not on file    Number of children: Not on file    Years of education: Not on file    Highest education level: Not on file   Occupational History    Not on file   Social Needs    Financial resource strain: Not on file    Food insecurity     Worry: Not on file     Inability: Not on file    Transportation needs     Medical: Not on file     Non-medical: Not on file   Tobacco Use    Smoking status: Current Every Day Smoker     Packs/day: 1.00     Years: 25.00     Pack years: 25.00     Start date: 1994    Smokeless tobacco: Never Used    Tobacco comment: Handout provided for 1-800-QUIT-NOW smoking cessation program.    Substance and Sexual Activity    Alcohol use: Yes    Drug use: No    Sexual activity: Not on file   Lifestyle    Physical activity     Days per week: Not on file     Minutes per session: Not on file    Stress: Not on file   Relationships    Social connections     Talks  "on phone: Not on file     Gets together: Not on file     Attends [employer] service: Not on file     Active member of club or organization: Not on file     Attends meetings of clubs or organizations: Not on file     Relationship status: Not on file   Other Topics Concern    Not on file   Social History Narrative    Not on file       OBJECTIVE:    /78   Pulse (!) 116   Temp 98.8 °F (37.1 °C)   Ht 6' 2" (1.88 m)   Wt 75.3 kg (166 lb 0.1 oz)   BMI 21.31 kg/m²     PHYSICAL EXAMINATION:    General appearance: Well appearing, in no acute distress, alert and oriented x3.  Psych:  Mood and affect appropriate.  Skin: Skin color, texture, turgor normal, no rashes or lesions, in both upper and lower body.  Head/face:  Atraumatic, normocephalic. No palpable lymph nodes  Cor: RRR  Pulm: CTA  GI: Abdomen soft and non-tender.  Back: Straight leg raising in the sitting position is positive for radicular pain on the left, negative on the right. There is pain with palpation over lumbar facet joints bilaterally. Full ROM with pain on flexion and extension. Positive facet loading on the left.   Extremities: Peripheral joint ROM is full and pain free without obvious instability or laxity in all four extremities. No deformities, edema, or skin discoloration. Good capillary refill.  Musculoskeletal: Shoulder, hip, sacroiliac and knee provocative maneuvers are negative. Bilateral lower extremity strength is normal and symmetric.  No atrophy or tone abnormalities are noted.  Neuro: Bilateral lower extremity coordination and muscle stretch reflexes are physiologic and symmetric.  Plantar response are downgoing. No loss of sensation is noted.  Gait: Normal.    ASSESSMENT: 40 y.o. year old male with low back pain, consistent with the followin. Lumbar radiculopathy     2. Lumbar spondylosis  ketorolac (TORADOL) 10 mg tablet   3. DDD (degenerative disc disease), lumbar     4. Muscle spasm  cyclobenzaprine (FLEXERIL) 10 MG " tablet         PLAN:     - Previous imaging was reviewed and discussed with the patient today.    - Schedule for left L4/5 and L5/S1 TF ISABELLA.     - Continue Toradol 10 mg every 8 hours PRN. Instructed to take with food.     - Continue Flexeril 10 mg TID PRN.     - Continue home exercise routine.     - RTC 2 weeks after above procedure.     - Counseled patient regarding the importance of activity modification, constant sleeping habits and physical therapy.    - Dr. Leon was consulted on the patient and agrees with this plan.    The above plan and management options were discussed at length with patient. Patient is in agreement with the above and verbalized understanding.    Regina Leija NP  10/01/2020

## 2020-10-01 NOTE — PROGRESS NOTES
Chronic patient Established Note (Follow up visit)      SUBJECTIVE:    Interval History 10/1/2020:  The patient returns to clinic today for follow up of low back pain. He reports increased low back pain that radiates into the posterolateral aspect of his left leg to his knee. He reports intermittent radiating pain to the top of his left foot. He describes this pain as shooting and tingling. He denies any right leg pain. His pain is worse with activity. He did go to Urgent Care recently where he was given Toradol, Flexeril, and a Medrol pack. He did not take the medrol pack. He does report some benefit with Toradol and Flexeril. He denies any weakness, bowel or bladder incontinence. He denies any other health changes. His pain today is 8/10.    Interval History 8/14/2019:  Erik Mims presents to the clinic for a follow-up appointment for low back pain. He reports that his low back and leg pain is much improved since last visit. He reports intermittent low back pain that is aching in nature. He reports intermittent radiating pain down the posterior aspect of his thighs. He has completed 2 physical therapy sessions at this time. He continues to take Robaxin as needed. He denies any other health changes. He denies any bowel or bladder incontinence. Since the last visit, Erik Mims states the pain has been improving. Current pain intensity is 3/10.    Pain Disability Index Review:  Last 3 PDI Scores 10/1/2020 8/14/2019 7/17/2019   Pain Disability Index (PDI) 40 19 27       Pain Medications:  Toradol   Flexeril    Opioid Contract: no     report:  Reviewed and consistent with medication use as prescribed.    Pain Procedures:   None    Physical Therapy/Home Exercise: yes    Imaging:   MRI Lumbar Spine 7/10/2019:  COMPARISON:  Lumbar spine radiograph dated 07/09/2019    FINDINGS:  Lumbar spine vertebral body heights and alignment are maintained.  No marrow replacing process.  Degenerative endplate  changes and mild edema at L5-S1.  Spinal cord terminus lies at L1-L2.  Prevertebral and paraspinal soft tissues are unremarkable.    T12-L1: No significant posterior disc herniation, spinal canal stenosis, or neural foraminal impingement.    L1-L2: No significant posterior disc herniation, spinal canal stenosis, or neural foraminal impingement.    L2-L3: Flavum thickening with mild facet DJD.  No significant neural foraminal narrowing or spinal canal stenosis.    L3-L4: Flavum thickening with mild facet DJD.  No significant neural foraminal narrowing or spinal canal stenosis.    L4-L5: There is disc desiccation with circumferential bulge and superimposed central protrusion resulting in partial collapse of the anterior thecal sac.  This in combination with flavum thickening and facet DJD results in overall mild central canal stenosis with lateral recess narrowing and probable contact of the transiting left L5 nerve root.  No significant neural foraminal impingement.    L5-S1: There is disc desiccation with height loss and posterior annular fissure.  Circumferential bulge with small central protrusion without significant spinal canal stenosis or neural foraminal impingement.  Bilateral facet DJD with trace effusions.      Impression       Lower lumbar spondylosis resulting in mild central canal stenosis at L4-L5 with lateral recess narrowing and probable contact of the left transiting L5 nerve root.    Trace bilateral facet effusions at L5-S1.     Xray Lumbar Spine 7/9/2019:  FINDINGS:  Mild lumbar scoliosis.  The lumbosacral disc space is narrowed.  The other disc spaces are well maintained.  No fracture, spondylolisthesis or bone destruction identified      Impression       See above         Allergies: Review of patient's allergies indicates:  No Known Allergies    Current Medications:   Current Outpatient Medications   Medication Sig Dispense Refill    cyclobenzaprine (FLEXERIL) 10 MG tablet Take 1 tablet (10 mg  total) by mouth 3 (three) times daily as needed for Muscle spasms. 30 tablet 0    DULoxetine (CYMBALTA) 60 MG capsule TAKE 1 CAPSULE BY MOUTH DAILY 90 capsule 0    HYDROcodone-acetaminophen (NORCO)  mg per tablet Take 1 tablet by mouth every 6 (six) hours as needed for Pain. 21 tablet 0    ketorolac (TORADOL) 10 mg tablet Take one po q 8 hrs prn pain 20 tablet 0    lisinopriL-hydrochlorothiazide (PRINZIDE,ZESTORETIC) 20-12.5 mg per tablet TAKE 1 TABLET BY MOUTH DAILY 90 tablet 0    methocarbamol (ROBAXIN) 750 MG Tab TAKE 1 TABLET(750 MG) BY MOUTH THREE TIMES DAILY 90 tablet 0    predniSONE (DELTASONE) 10 MG tablet Take 2 po bid x 3 days, one bid x 3 days, then one daily till finish 30 tablet 0    ipratropium-albuterol (COMBIVENT)  mcg/actuation inhaler Inhale 1 puff into the lungs every 4 (four) hours as needed for Wheezing. Rescue (Patient not taking: Reported on 10/1/2020) 4 g 3    nitroGLYCERIN (NITROSTAT) 0.4 MG SL tablet Place 1 tablet (0.4 mg total) under the tongue every 5 (five) minutes as needed for Chest pain. 20 tablet 12    rOPINIRole (REQUIP) 0.25 MG tablet Take 1 tablet (0.25 mg total) by mouth nightly as needed. Further refills require visit with Dr. Dang 90 tablet 0    umeclidinium (INCRUSE ELLIPTA) 62.5 mcg/actuation inhalation capsule Inhale 1 capsule (63 mcg total) into the lungs once daily. Controller (Patient not taking: Reported on 9/27/2020) 90 each 0     No current facility-administered medications for this visit.        REVIEW OF SYSTEMS:    GENERAL:  No weight loss, malaise or fevers.  HEENT:  Negative for frequent or significant headaches.  NECK:  Negative for lumps, goiter, pain and significant neck swelling.  RESPIRATORY:  Negative for cough, wheezing or shortness of breath. COPD  CARDIOVASCULAR:  Negative for chest pain, leg swelling or palpitations. HTN  GI:  Negative for abdominal discomfort, blood in stools or black stools or change in bowel  habits.  MUSCULOSKELETAL:  See HPI.  SKIN:  Negative for lesions, rash, and itching.  PSYCH:  Negative for sleep disturbance, mood disorder and recent psychosocial stressors.  HEMATOLOGY/LYMPHOLOGY:  Negative for prolonged bleeding, bruising easily or swollen nodes.  NEURO:   No history of headaches, syncope, paralysis, seizures or tremors.  All other reviewed and negative other than HPI.    Past Medical History:  Past Medical History:   Diagnosis Date    COPD (chronic obstructive pulmonary disease)     Hypertension     Syncope     Tobacco use 7/20/2017       Past Surgical History:  Past Surgical History:   Procedure Laterality Date    LAPAROSCOPIC CHOLECYSTECTOMY WITH CHOLANGIOGRAPHY N/A 10/2/2019    Procedure: CHOLECYSTECTOMY, LAPAROSCOPIC, WITH CHOLANGIOGRAM;  Surgeon: Dylan Licona MD;  Location: [facility] OR;  Service: General;  Laterality: N/A;    LUNG SURGERY         Family History:  No family history on file.    Social History:  Social History     Socioeconomic History    Marital status: Single     Spouse name: Not on file    Number of children: Not on file    Years of education: Not on file    Highest education level: Not on file   Occupational History    Not on file   Social Needs    Financial resource strain: Not on file    Food insecurity     Worry: Not on file     Inability: Not on file    Transportation needs     Medical: Not on file     Non-medical: Not on file   Tobacco Use    Smoking status: Current Every Day Smoker     Packs/day: 1.00     Years: 25.00     Pack years: 25.00     Start date: 1994    Smokeless tobacco: Never Used    Tobacco comment: Handout provided for 1-800-QUIT-NOW smoking cessation program.    Substance and Sexual Activity    Alcohol use: Yes    Drug use: No    Sexual activity: Not on file   Lifestyle    Physical activity     Days per week: Not on file     Minutes per session: Not on file    Stress: Not on file   Relationships    Social connections     Talks  "on phone: Not on file     Gets together: Not on file     Attends Yarsanism service: Not on file     Active member of club or organization: Not on file     Attends meetings of clubs or organizations: Not on file     Relationship status: Not on file   Other Topics Concern    Not on file   Social History Narrative    Not on file       OBJECTIVE:    /78   Pulse (!) 116   Temp 98.8 °F (37.1 °C)   Ht 6' 2" (1.88 m)   Wt 75.3 kg (166 lb 0.1 oz)   BMI 21.31 kg/m²     PHYSICAL EXAMINATION:    General appearance: Well appearing, in no acute distress, alert and oriented x3.  Psych:  Mood and affect appropriate.  Skin: Skin color, texture, turgor normal, no rashes or lesions, in both upper and lower body.  Head/face:  Atraumatic, normocephalic. No palpable lymph nodes  Cor: RRR  Pulm: CTA  GI: Abdomen soft and non-tender.  Back: Straight leg raising in the sitting position is positive for radicular pain on the left, negative on the right. There is pain with palpation over lumbar facet joints bilaterally. Full ROM with pain on flexion and extension. Positive facet loading on the left.   Extremities: Peripheral joint ROM is full and pain free without obvious instability or laxity in all four extremities. No deformities, edema, or skin discoloration. Good capillary refill.  Musculoskeletal: Shoulder, hip, sacroiliac and knee provocative maneuvers are negative. Bilateral lower extremity strength is normal and symmetric.  No atrophy or tone abnormalities are noted.  Neuro: Bilateral lower extremity coordination and muscle stretch reflexes are physiologic and symmetric.  Plantar response are downgoing. No loss of sensation is noted.  Gait: Normal.    ASSESSMENT: 40 y.o. year old male with low back pain, consistent with the followin. Lumbar radiculopathy     2. Lumbar spondylosis  ketorolac (TORADOL) 10 mg tablet   3. DDD (degenerative disc disease), lumbar     4. Muscle spasm  cyclobenzaprine (FLEXERIL) 10 MG " tablet         PLAN:     - Previous imaging was reviewed and discussed with the patient today.    - Schedule for left L4/5 and L5/S1 TF ISABELLA.     - Continue Toradol 10 mg every 8 hours PRN. Instructed to take with food.     - Continue Flexeril 10 mg TID PRN.     - Continue home exercise routine.     - RTC 2 weeks after above procedure.     - Counseled patient regarding the importance of activity modification, constant sleeping habits and physical therapy.    - Dr. Leon was consulted on the patient and agrees with this plan.    The above plan and management options were discussed at length with patient. Patient is in agreement with the above and verbalized understanding.    Regina Leija NP  10/01/2020

## 2020-10-12 ENCOUNTER — TELEPHONE (OUTPATIENT)
Dept: PAIN MEDICINE | Facility: CLINIC | Age: 40
End: 2020-10-12

## 2020-10-12 NOTE — TELEPHONE ENCOUNTER
----- Message from Coral Walls sent at 10/12/2020 12:46 PM CDT -----  Contact: DEIDRE ALSTON [[id number]]  Name of Who is Calling: DEIDRE ALSTON [[id number]]      What is the request in detail: Patient would like to reschedule procedure. Please call       Can the clinic reply by [facility]: no      What Number to Call Back if not in [facility]: [phone] (home)

## 2020-10-19 ENCOUNTER — HOSPITAL ENCOUNTER (OUTPATIENT)
Facility: OTHER | Age: 40
Discharge: HOME | End: 2020-10-19
Attending: ANESTHESIOLOGY | Admitting: ANESTHESIOLOGY
Payer: COMMERCIAL

## 2020-10-19 VITALS
WEIGHT: 165 LBS | TEMPERATURE: 99 F | RESPIRATION RATE: 16 BRPM | BODY MASS INDEX: 21.17 KG/M2 | DIASTOLIC BLOOD PRESSURE: 99 MMHG | OXYGEN SATURATION: 95 % | HEIGHT: 74 IN | SYSTOLIC BLOOD PRESSURE: 145 MMHG | HEART RATE: 95 BPM

## 2020-10-19 DIAGNOSIS — M51.36 DDD (DEGENERATIVE DISC DISEASE), LUMBAR: ICD-10-CM

## 2020-10-19 DIAGNOSIS — M54.16 LUMBAR RADICULOPATHY: ICD-10-CM

## 2020-10-19 DIAGNOSIS — M54.17 LUMBOSACRAL RADICULOPATHY: Primary | ICD-10-CM

## 2020-10-19 PROCEDURE — 63600175 PHARM REV CODE 636 W HCPCS: Performed by: ANESTHESIOLOGY

## 2020-10-19 PROCEDURE — 25500020 PHARM REV CODE 255: Performed by: ANESTHESIOLOGY

## 2020-10-19 PROCEDURE — 64483 NJX AA&/STRD TFRM EPI L/S 1: CPT | Mod: LT,,, | Performed by: ANESTHESIOLOGY

## 2020-10-19 PROCEDURE — 64484 NJX AA&/STRD TFRM EPI L/S EA: CPT | Mod: LT,,, | Performed by: ANESTHESIOLOGY

## 2020-10-19 PROCEDURE — 64483 PR EPIDURAL INJ, ANES/STEROID, TRANSFORAMINAL, LUMB/SACR, SNGL LEVL: ICD-10-PCS | Mod: LT,,, | Performed by: ANESTHESIOLOGY

## 2020-10-19 PROCEDURE — 64483 NJX AA&/STRD TFRM EPI L/S 1: CPT | Mod: LT | Performed by: ANESTHESIOLOGY

## 2020-10-19 PROCEDURE — 25000003 PHARM REV CODE 250: Performed by: ANESTHESIOLOGY

## 2020-10-19 PROCEDURE — 64484 NJX AA&/STRD TFRM EPI L/S EA: CPT | Mod: LT | Performed by: ANESTHESIOLOGY

## 2020-10-19 PROCEDURE — 64484 PRA INJECT ANES/STEROID FORAMEN LUMBAR/SACRAL W IMG GUIDE ,EA ADD LEVEL: ICD-10-PCS | Mod: LT,,, | Performed by: ANESTHESIOLOGY

## 2020-10-19 RX ORDER — DEXAMETHASONE SODIUM PHOSPHATE 100 MG/10ML
INJECTION INTRAMUSCULAR; INTRAVENOUS
Status: DISCONTINUED | OUTPATIENT
Start: 2020-10-19 | End: 2020-10-19 | Stop reason: HOSPADM

## 2020-10-19 RX ORDER — SODIUM CHLORIDE 9 MG/ML
INJECTION, SOLUTION INTRAVENOUS CONTINUOUS
Status: DISCONTINUED | OUTPATIENT
Start: 2020-10-19 | End: 2020-10-19 | Stop reason: HOSPADM

## 2020-10-19 RX ORDER — MIDAZOLAM HYDROCHLORIDE 1 MG/ML
INJECTION INTRAMUSCULAR; INTRAVENOUS
Status: DISCONTINUED | OUTPATIENT
Start: 2020-10-19 | End: 2020-10-19 | Stop reason: HOSPADM

## 2020-10-19 RX ORDER — FENTANYL CITRATE 50 UG/ML
INJECTION, SOLUTION INTRAMUSCULAR; INTRAVENOUS
Status: DISCONTINUED | OUTPATIENT
Start: 2020-10-19 | End: 2020-10-19 | Stop reason: HOSPADM

## 2020-10-19 RX ORDER — LIDOCAINE HYDROCHLORIDE 10 MG/ML
INJECTION, SOLUTION EPIDURAL; INFILTRATION; INTRACAUDAL; PERINEURAL
Status: DISCONTINUED | OUTPATIENT
Start: 2020-10-19 | End: 2020-10-19 | Stop reason: HOSPADM

## 2020-10-19 RX ORDER — LIDOCAINE HYDROCHLORIDE 10 MG/ML
INJECTION INFILTRATION; PERINEURAL
Status: DISCONTINUED | OUTPATIENT
Start: 2020-10-19 | End: 2020-10-19 | Stop reason: HOSPADM

## 2020-10-19 RX ORDER — FENTANYL CITRATE 50 UG/ML
100 INJECTION, SOLUTION INTRAMUSCULAR; INTRAVENOUS
Status: DISCONTINUED | OUTPATIENT
Start: 2020-10-19 | End: 2020-11-18

## 2020-10-19 RX ORDER — MIDAZOLAM HYDROCHLORIDE 1 MG/ML
5 INJECTION INTRAMUSCULAR; INTRAVENOUS
Status: DISCONTINUED | OUTPATIENT
Start: 2020-10-19 | End: 2020-11-18

## 2020-10-19 RX ADMIN — SODIUM CHLORIDE: 0.9 INJECTION, SOLUTION INTRAVENOUS at 02:10

## 2020-10-19 NOTE — DISCHARGE INSTRUCTIONS
Thank you for allowing us to care for you today. You may receive a survey about the care we provided. Your feedback is valuable and helps us provide excellent care throughout the community.     Home Care Instructions for Pain Management:    1. DIET:   You may resume your normal diet today.   2. BATHING:   You may shower with luke warm water. No tub baths or anything that will soak injection sites under water for the next 24 hours.  3. DRESSING:   You may remove your bandage today.   4. ACTIVITY LEVEL:   You may resume your normal activities 24 hrs after your procedure. Nothing strenuous today.  5. MEDICATIONS:   You may resume your normal medications today. To restart blood thinners, ask your doctor.  6. DRIVING    If you have received any sedatives by mouth today, you may not drive for 12 hours.    If you have received any sedation through your IV, you may not drive for 24 hrs.   7. SPECIAL INSTRUCTIONS:   No heat to the injection site for 24 hrs including, hot bath or shower, heating pad, moist heat, or hot tubs.    Use ice pack to injection site for any pain or discomfort.  Apply ice packs for 20 minute intervals as needed.    IF you have diabetes, be sure to monitor your blood sugar more closely. IF your injection contained steroids your blood sugar levels may become higher than normal.    If you are still having pain upon discharge:  Your pain may improve over the next 48 hours. The anesthetic (numbing medication) works immediately to 48 hours. IF your injection contained a steroid (anti-inflammatory medication), it takes approximately 3 days to start feeling relief and 7-10 days to see your greatest results from the medication. It is possible you may need subsequent injections. This would be discussed at your follow up appointment with pain management or your referring doctor.    Please call the PAIN MANAGEMENT office at [phone] or ON CALL pager at [phone] if you experienced any:   -Weakness or  loss of sensation  -Fever > 101.5  -Pain uncontrolled with oral medications   -Persistent nausea, vomiting, or diarrhea  -Redness or drainage from the injection sites, or any other worrisome concerns.   If physician on call was not reached or could not communicate with our office for any reason please go to the nearest emergency department.    Recovery After Procedural Sedation (Adult)   You have been given medicine by vein to make you sleep during your surgery. This may have included both a pain medicine and sleeping medicine. Most of the effects have worn off. But you may still have some drowsiness for the next 6 to 8 hours.  Home care  Follow these guidelines when you get home:  · For the next 8 hours, you should be watched by a responsible adult. This person should make sure your condition is not getting worse.  · Don't drink any alcohol for the next 24 hours.  · Don't drive, operate dangerous machinery, or make important business or personal decisions during the next 24 hours.  · To prevent injury or falls, use caution when standing and walking for at least 24 hours after your procedure.  Note: Your healthcare provider may tell you not to take any medicine by mouth for pain or sleep in the next 4 hours. These medicines may react with the medicines you were given in the hospital. This could cause a much stronger response than usual.  Follow-up care  Follow up with your healthcare provider if you are not alert and back to your usual level of activity within 12 hours.  When to seek medical advice  Call your healthcare provider right away if any of these occur:  · Drowsiness gets worse  · Weakness or dizziness gets worse  · Repeated vomiting  · You can't be awakened  · Fever  · New rash  [employer] last reviewed this educational content on 9/1/2019 © 2000-2020 [employer]. [address] [place] [address], [place] [address]. All rights reserved. This information is not intended as a substitute for professional  medical care. Always follow your healthcare professional's instructions.

## 2020-10-19 NOTE — OP NOTE
Date of Service: 10/19/2020    PCP: Benny Dang MD    Referring Physician:    Time-out taken to identify patient and procedure side prior to starting the procedure.   I attest that I have reviewed the patient's home medications prior to the procedure and no contraindication have been identified. I  re-evaluated the patient after the patient was positioned for the procedure in the procedure room immediately before the procedural time-out. The vital signs are current and represent the current state of the patient which has not significantly changed since the preprocedure assessment.                                                           PROCEDURE: Left L4/5 & L5/S1 transforaminal epidural steroid injection under fluoroscopy    REASON FOR PROCEDURE: Left Lumbar radiculopathy [M54.16]  DDD (degenerative disc disease), lumbar [M51.36]  1. Lumbosacral radiculopathy    2. DDD (degenerative disc disease), lumbar    3. Lumbar radiculopathy      POSTPROCEDURE DIAGNOSIS:   Lumbar radiculopathy [M54.16]  DDD (degenerative disc disease), lumbar [M51.36]    1. Lumbosacral radiculopathy    2. DDD (degenerative disc disease), lumbar    3. Lumbar radiculopathy           PHYSICIAN: Farrah Leon MD  ASSISTANTS:Becky Bravo MD PMR Resident     MEDICATIONS INJECTED:  Preservative-free dexamethasone 10mg, Xylocaine 1% MPF 3-5ml. 3ml per level. Preservative free, sterile normal saline is used to get larger volume as needed.  LOCAL ANESTHETIC INJECTED:  Xylocaine 1% 9ml with Sodium Bicarbonate 1ml. 3ml per site.    SEDATION MEDICATIONS: local/IV sedation: Versed 2 mg and fentanyl 25 mcg IV.  Conscious sedation ordered by MD.  Patient reevaluated and sedation administered by MD and monitored by RN.  Total sedation time was less than 10 min. (See nurse documentation and case log for sedation time)    ESTIMATED BLOOD LOSS:  None.    COMPLICATIONS:  None.    TECHNIQUE:   Laying in a prone position, the patient was prepped  and draped in the usual sterile fashion using ChloraPrep and fenestrated drape.  The area to be injected was determined under fluoroscopic guidance.  Local anesthetic was given by raising a wheel and going down to the hub of a 27-gauge 1.25 inch needle.  The 3.5inch 22-gauge spinal needle was introduced towards the transverse process of all levels as stated above.   The needle was walked medially then hinged into the neural foramen.  Omnipaque was injected to confirm appropriate placement and that there was no vascular runoff.  The medication was then injected after applying negative pressure. The patient tolerated the procedure well.    PAIN BEFORE THE PROCEDURE: 7-10/10.    PAIN AFTER THE PROCEDURE: 6/10.    The patient was monitored after the procedure.  Patient was given post procedure and discharge instructions to follow at home.  We will see the patient back in two weeks or the patient may call to inform of status. The patient was discharged in a stable condition.

## 2020-10-19 NOTE — INTERVAL H&P NOTE
The patient has been examined and the H&P has been reviewed:    I concur with the findings and no changes have occurred since H&P was written.    Surgery risks, benefits and alternative options discussed and understood by patient/family.          Active Hospital Problems    Diagnosis  POA    Lumbar radiculopathy [M54.16]  Yes      Resolved Hospital Problems   No resolved problems to display.

## 2020-10-19 NOTE — DISCHARGE SUMMARY
Discharge Note  Short Stay      SUMMARY     Admit Date: 10/19/2020    Attending Physician: Farrah Leon      Discharge Physician: Farrah Leon      Discharge Date: 10/19/2020 3:28 PM    Procedure(s) (LRB):  INJECTION, STEROID, EPIDURAL, TRANSFORAMINAL APPROACH, L4-L5 AND L5-S1 (Left)    Final Diagnosis: Lumbar radiculopathy [M54.16]  DDD (degenerative disc disease), lumbar [M51.36]    Disposition: Home or self care    Patient Instructions:   Current Discharge Medication List      CONTINUE these medications which have NOT CHANGED    Details   DULoxetine (CYMBALTA) 60 MG capsule TAKE 1 CAPSULE BY MOUTH DAILY  Qty: 90 capsule, Refills: 0    Associated Diagnoses: Anxiety      HYDROcodone-acetaminophen (NORCO)  mg per tablet Take 1 tablet by mouth every 6 (six) hours as needed for Pain.  Qty: 21 tablet, Refills: 0    Comments:        ketorolac (TORADOL) 10 mg tablet Take one po q 8 hrs prn pain  Qty: 30 tablet, Refills: 0    Associated Diagnoses: Lumbar spondylosis      lisinopriL-hydrochlorothiazide (PRINZIDE,ZESTORETIC) 20-12.5 mg per tablet TAKE 1 TABLET BY MOUTH DAILY  Qty: 90 tablet, Refills: 0    Associated Diagnoses: Essential hypertension      methocarbamoL (ROBAXIN) 750 MG Tab TAKE 1 TABLET(750 MG) BY MOUTH THREE TIMES DAILY  Qty: 90 tablet, Refills: 0      nitroGLYCERIN (NITROSTAT) 0.4 MG SL tablet Place 1 tablet (0.4 mg total) under the tongue every 5 (five) minutes as needed for Chest pain.  Qty: 20 tablet, Refills: 12    Associated Diagnoses: Chest tightness or pressure      predniSONE (DELTASONE) 10 MG tablet Take 2 po bid x 3 days, one bid x 3 days, then one daily till finish  Qty: 30 tablet, Refills: 0      rOPINIRole (REQUIP) 0.25 MG tablet Take 1 tablet (0.25 mg total) by mouth nightly as needed. Further refills require visit with Dr. Dang  Qty: 90 tablet, Refills: 0    Associated Diagnoses: RLS (restless legs syndrome)         STOP taking these medications       cyclobenzaprine (FLEXERIL) 10 MG  tablet Comments:   Reason for Stopping:                   Discharge Diagnosis: Lumbar radiculopathy [M54.16]  DDD (degenerative disc disease), lumbar [M51.36]  Condition on Discharge: Stable with no complications to procedure   Diet on Discharge: Same as before.  Activity: as per instruction sheet.  Discharge to: Home with a responsible adult.  Follow up: 2-4 weeks       Please call my office or pager at [phone] if experienced any weakness or loss of sensation, fever > 101.5, pain uncontrolled with oral medications, persistent nausea/vomiting/or diarrhea, redness or drainage from the incisions, or any other worrisome concerns. If physician on call was not reached or could not communicate with our office for any reason please go to the nearest emergency department

## 2020-11-03 ENCOUNTER — OFFICE VISIT (OUTPATIENT)
Dept: PAIN MEDICINE | Facility: CLINIC | Age: 40
End: 2020-11-03
Payer: COMMERCIAL

## 2020-11-03 DIAGNOSIS — M25.552 CHRONIC LEFT HIP PAIN: ICD-10-CM

## 2020-11-03 DIAGNOSIS — G89.29 CHRONIC LEFT HIP PAIN: ICD-10-CM

## 2020-11-03 DIAGNOSIS — M47.816 LUMBAR SPONDYLOSIS: ICD-10-CM

## 2020-11-03 DIAGNOSIS — M51.36 DDD (DEGENERATIVE DISC DISEASE), LUMBAR: ICD-10-CM

## 2020-11-03 DIAGNOSIS — M54.16 LUMBAR RADICULOPATHY: Primary | ICD-10-CM

## 2020-11-03 DIAGNOSIS — M62.838 MUSCLE SPASM: ICD-10-CM

## 2020-11-03 PROCEDURE — 99213 OFFICE O/P EST LOW 20 MIN: CPT | Mod: 95,,, | Performed by: NURSE PRACTITIONER

## 2020-11-03 PROCEDURE — 99213 PR OFFICE/OUTPT VISIT, EST, LEVL III, 20-29 MIN: ICD-10-PCS | Mod: 95,,, | Performed by: NURSE PRACTITIONER

## 2020-11-03 NOTE — PROGRESS NOTES
Chronic patient Established Note (Follow up visit)  Chronic Pain-Tele-Medicine-Established Note (Follow up visit)        The patient location is: Home  The chief complaint leading to consultation is: pain  Visit type: Virtual visit with synchronous audio and video  Total time spent with patient: 25 min  Each patient to whom he or she provides medical services by telemedicine is:  (1) informed of the relationship between the physician and patient and the respective role of any other health care provider with respect to management of the patient; and (2) notified that he or she may decline to receive medical services by telemedicine and may withdraw from such care at any time.      SUBJECTIVE:    Interval History 11/3/2020:  The patient returns to clinic today for follow up of low back pain. He is s/p left L4/5 and L5/S1 TF ISABELLA on 10/19/2020. He reports 80% relief of his low back and left leg pain. He denies any low back or radicular leg pain at this time. He does report left hip pain. This pain is worse with standing, walking, and activity. He reports that he has had this pain since being in the . He does take Robaxin as needed with benefit. He denies any other health changes. His pain today is 3/10.    Interval History 10/1/2020:  The patient returns to clinic today for follow up of low back pain. He reports increased low back pain that radiates into the posterolateral aspect of his left leg to his knee. He reports intermittent radiating pain to the top of his left foot. He describes this pain as shooting and tingling. He denies any right leg pain. His pain is worse with activity. He did go to Urgent Care recently where he was given Toradol, Flexeril, and a Medrol pack. He did not take the medrol pack. He does report some benefit with Toradol and Flexeril. He denies any weakness, bowel or bladder incontinence. He denies any other health changes. His pain today is 8/10.    Interval History 8/14/2019:  Erik  Tejinder Mims presents to the clinic for a follow-up appointment for low back pain. He reports that his low back and leg pain is much improved since last visit. He reports intermittent low back pain that is aching in nature. He reports intermittent radiating pain down the posterior aspect of his thighs. He has completed 2 physical therapy sessions at this time. He continues to take Robaxin as needed. He denies any other health changes. He denies any bowel or bladder incontinence. Since the last visit, Erik Mims states the pain has been improving. Current pain intensity is 3/10.    Pain Disability Index Review:  Last 3 PDI Scores 10/1/2020 8/14/2019 7/17/2019   Pain Disability Index (PDI) 40 19 27       Pain Medications:  Toradol   Flexeril    Opioid Contract: no     report:  Reviewed and consistent with medication use as prescribed.    Pain Procedures:   10/19/2020- Left L4/5 and L5/S1 TF ISABELLA- 80% relief    Physical Therapy/Home Exercise: yes    Imaging:   MRI Lumbar Spine 7/10/2019:  COMPARISON:  Lumbar spine radiograph dated 07/09/2019    FINDINGS:  Lumbar spine vertebral body heights and alignment are maintained.  No marrow replacing process.  Degenerative endplate changes and mild edema at L5-S1.  Spinal cord terminus lies at L1-L2.  Prevertebral and paraspinal soft tissues are unremarkable.    T12-L1: No significant posterior disc herniation, spinal canal stenosis, or neural foraminal impingement.    L1-L2: No significant posterior disc herniation, spinal canal stenosis, or neural foraminal impingement.    L2-L3: Flavum thickening with mild facet DJD.  No significant neural foraminal narrowing or spinal canal stenosis.    L3-L4: Flavum thickening with mild facet DJD.  No significant neural foraminal narrowing or spinal canal stenosis.    L4-L5: There is disc desiccation with circumferential bulge and superimposed central protrusion resulting in partial collapse of the anterior thecal sac.  This  in combination with flavum thickening and facet DJD results in overall mild central canal stenosis with lateral recess narrowing and probable contact of the transiting left L5 nerve root.  No significant neural foraminal impingement.    L5-S1: There is disc desiccation with height loss and posterior annular fissure.  Circumferential bulge with small central protrusion without significant spinal canal stenosis or neural foraminal impingement.  Bilateral facet DJD with trace effusions.      Impression       Lower lumbar spondylosis resulting in mild central canal stenosis at L4-L5 with lateral recess narrowing and probable contact of the left transiting L5 nerve root.    Trace bilateral facet effusions at L5-S1.     Xray Lumbar Spine 7/9/2019:  FINDINGS:  Mild lumbar scoliosis.  The lumbosacral disc space is narrowed.  The other disc spaces are well maintained.  No fracture, spondylolisthesis or bone destruction identified      Impression       See above         Allergies: Review of patient's allergies indicates:  No Known Allergies    Current Medications:   Current Outpatient Medications   Medication Sig Dispense Refill    DULoxetine (CYMBALTA) 60 MG capsule TAKE 1 CAPSULE BY MOUTH DAILY 90 capsule 0    HYDROcodone-acetaminophen (NORCO)  mg per tablet Take 1 tablet by mouth every 6 (six) hours as needed for Pain. 21 tablet 0    ketorolac (TORADOL) 10 mg tablet Take one po q 8 hrs prn pain 30 tablet 0    lisinopriL-hydrochlorothiazide (PRINZIDE,ZESTORETIC) 20-12.5 mg per tablet TAKE 1 TABLET BY MOUTH DAILY 90 tablet 0    methocarbamoL (ROBAXIN) 750 MG Tab TAKE 1 TABLET(750 MG) BY MOUTH THREE TIMES DAILY 90 tablet 0    nitroGLYCERIN (NITROSTAT) 0.4 MG SL tablet Place 1 tablet (0.4 mg total) under the tongue every 5 (five) minutes as needed for Chest pain. 20 tablet 12    predniSONE (DELTASONE) 10 MG tablet Take 2 po bid x 3 days, one bid x 3 days, then one daily till finish 30 tablet 0    rOPINIRole  (REQUIP) 0.25 MG tablet Take 1 tablet (0.25 mg total) by mouth nightly as needed. Further refills require visit with Dr. Dang 90 tablet 0     No current facility-administered medications for this visit.      Facility-Administered Medications Ordered in Other Visits   Medication Dose Route Frequency Provider Last Rate Last Dose    fentaNYL injection 100 mcg  100 mcg Intravenous On Call Procedure Kenny Vogt MD        midazolam (VERSED) 1 mg/mL injection 5 mg  5 mg Intravenous On Call Procedure Kenny Vogt MD           REVIEW OF SYSTEMS:    GENERAL:  No weight loss, malaise or fevers.  HEENT:  Negative for frequent or significant headaches.  NECK:  Negative for lumps, goiter, pain and significant neck swelling.  RESPIRATORY:  Negative for cough, wheezing or shortness of breath. COPD  CARDIOVASCULAR:  Negative for chest pain, leg swelling or palpitations. HTN  GI:  Negative for abdominal discomfort, blood in stools or black stools or change in bowel habits.  MUSCULOSKELETAL:  See HPI.  SKIN:  Negative for lesions, rash, and itching.  PSYCH:  Negative for sleep disturbance, mood disorder and recent psychosocial stressors.  HEMATOLOGY/LYMPHOLOGY:  Negative for prolonged bleeding, bruising easily or swollen nodes.  NEURO:   No history of headaches, syncope, paralysis, seizures or tremors.  All other reviewed and negative other than HPI.    Past Medical History:  Past Medical History:   Diagnosis Date    COPD (chronic obstructive pulmonary disease)     Hypertension     Syncope     Tobacco use 7/20/2017       Past Surgical History:  Past Surgical History:   Procedure Laterality Date    LAPAROSCOPIC CHOLECYSTECTOMY WITH CHOLANGIOGRAPHY N/A 10/2/2019    Procedure: CHOLECYSTECTOMY, LAPAROSCOPIC, WITH CHOLANGIOGRAM;  Surgeon: Dylan Licona MD;  Location: [facility];  Service: General;  Laterality: N/A;    LUNG SURGERY      TRANSFORAMINAL EPIDURAL INJECTION OF STEROID Left 10/19/2020     Procedure: INJECTION, STEROID, EPIDURAL, TRANSFORAMINAL APPROACH, L4-L5 AND L5-S1;  Surgeon: Farrah Leon MD;  Location: [facility];  Service: Pain Management;  Laterality: Left;       Family History:  History reviewed. No pertinent family history.    Social History:  Social History     Socioeconomic History    Marital status: Single     Spouse name: Not on file    Number of children: Not on file    Years of education: Not on file    Highest education level: Not on file   Occupational History    Not on file   Social Needs    Financial resource strain: Not on file    Food insecurity     Worry: Not on file     Inability: Not on file    Transportation needs     Medical: Not on file     Non-medical: Not on file   Tobacco Use    Smoking status: Current Every Day Smoker     Packs/day: 1.00     Years: 25.00     Pack years: 25.00     Start date: 1994    Smokeless tobacco: Never Used    Tobacco comment: Handout provided for 1-800-QUIT-NOW smoking cessation program.    Substance and Sexual Activity    Alcohol use: Yes    Drug use: No    Sexual activity: Not on file   Lifestyle    Physical activity     Days per week: Not on file     Minutes per session: Not on file    Stress: Not on file   Relationships    Social connections     Talks on phone: Not on file     Gets together: Not on file     Attends [employer] service: Not on file     Active member of club or organization: Not on file     Attends meetings of clubs or organizations: Not on file     Relationship status: Not on file   Other Topics Concern    Not on file   Social History Narrative    Not on file       OBJECTIVE:    Exam limited due to virtual visit:  General appearance: Well appearing, in no acute distress, alert and oriented x3.  Psych:  Mood and affect appropriate.    Previous physical exam:  There were no vitals taken for this visit.    PHYSICAL EXAMINATION:    General appearance: Well appearing, in no acute distress, alert and oriented  x3.  Psych:  Mood and affect appropriate.  Skin: Skin color, texture, turgor normal, no rashes or lesions, in both upper and lower body.  Head/face:  Atraumatic, normocephalic. No palpable lymph nodes  Cor: RRR  Pulm: CTA  GI: Abdomen soft and non-tender.  Back: Straight leg raising in the sitting position is positive for radicular pain on the left, negative on the right. There is pain with palpation over lumbar facet joints bilaterally. Full ROM with pain on flexion and extension. Positive facet loading on the left.   Extremities: Peripheral joint ROM is full and pain free without obvious instability or laxity in all four extremities. No deformities, edema, or skin discoloration. Good capillary refill.  Musculoskeletal: Shoulder, hip, sacroiliac and knee provocative maneuvers are negative. Bilateral lower extremity strength is normal and symmetric.  No atrophy or tone abnormalities are noted.  Neuro: Bilateral lower extremity coordination and muscle stretch reflexes are physiologic and symmetric.  Plantar response are downgoing. No loss of sensation is noted.  Gait: Normal.    ASSESSMENT: 40 y.o. year old male with low back pain, consistent with the followin. Lumbar radiculopathy     2. Lumbar spondylosis     3. DDD (degenerative disc disease), lumbar     4. Muscle spasm     5. Chronic left hip pain  Ambulatory referral/consult to Sports Medicine         PLAN:     - Previous imaging was reviewed and discussed with the patient today.    - He is s/p left L4/5 and L5/S1 TF ISABELLA with benefit. We can repeat this as needed.     - We discussed an in person visit to evaluate his hip. He would like to see a hip specialist. Consult Sports Medicine.      - Continue Robaxin PRN.     - Continue home exercise routine.     - RTC PRN.      - Counseled patient regarding the importance of activity modification, constant sleeping habits and physical therapy.    - Dr. Leon was consulted on the patient and agrees with this  plan.    The above plan and management options were discussed at length with patient. Patient is in agreement with the above and verbalized understanding.    Regina Leija NP  11/03/2020

## 2020-11-15 ENCOUNTER — OFFICE VISIT (OUTPATIENT)
Dept: URGENT CARE | Facility: CLINIC | Age: 40
End: 2020-11-15
Payer: COMMERCIAL

## 2020-11-15 VITALS
OXYGEN SATURATION: 96 % | WEIGHT: 165 LBS | DIASTOLIC BLOOD PRESSURE: 88 MMHG | SYSTOLIC BLOOD PRESSURE: 142 MMHG | HEART RATE: 98 BPM | HEIGHT: 74 IN | TEMPERATURE: 99 F | BODY MASS INDEX: 21.17 KG/M2 | RESPIRATION RATE: 18 BRPM

## 2020-11-15 DIAGNOSIS — M62.830 BACK SPASM: ICD-10-CM

## 2020-11-15 DIAGNOSIS — M54.2 CERVICALGIA: Primary | ICD-10-CM

## 2020-11-15 PROCEDURE — 96372 THER/PROPH/DIAG INJ SC/IM: CPT | Mod: S$GLB,,, | Performed by: NURSE PRACTITIONER

## 2020-11-15 PROCEDURE — 99214 OFFICE O/P EST MOD 30 MIN: CPT | Mod: 25,S$GLB,, | Performed by: NURSE PRACTITIONER

## 2020-11-15 PROCEDURE — 3008F BODY MASS INDEX DOCD: CPT | Mod: CPTII,S$GLB,, | Performed by: NURSE PRACTITIONER

## 2020-11-15 PROCEDURE — 3008F PR BODY MASS INDEX (BMI) DOCUMENTED: ICD-10-PCS | Mod: CPTII,S$GLB,, | Performed by: NURSE PRACTITIONER

## 2020-11-15 PROCEDURE — 96372 PR INJECTION,THERAP/PROPH/DIAG2ST, IM OR SUBCUT: ICD-10-PCS | Mod: S$GLB,,, | Performed by: NURSE PRACTITIONER

## 2020-11-15 PROCEDURE — 99214 PR OFFICE/OUTPT VISIT, EST, LEVL IV, 30-39 MIN: ICD-10-PCS | Mod: 25,S$GLB,, | Performed by: NURSE PRACTITIONER

## 2020-11-15 RX ORDER — INFLUENZA A VIRUS A/NEBRASKA/14/2019 (H1N1) ANTIGEN (MDCK CELL DERIVED, PROPIOLACTONE INACTIVATED), INFLUENZA A VIRUS A/DELAWARE/39/2019 (H3N2) ANTIGEN (MDCK CELL DERIVED, PROPIOLACTONE INACTIVATED), INFLUENZA B VIRUS B/SINGAPORE/INFTT-16-0610/2016 ANTIGEN (MDCK CELL DERIVED, PROPIOLACTONE INACTIVATED), INFLUENZA B VIRUS B/DARWIN/7/2019 ANTIGEN (MDCK CELL DERIVED, PROPIOLACTONE INACTIVATED) 15; 15; 15; 15 UG/.5ML; UG/.5ML; UG/.5ML; UG/.5ML
INJECTION, SUSPENSION INTRAMUSCULAR
COMMUNITY
Start: 2020-10-20

## 2020-11-15 RX ORDER — NAPROXEN 500 MG/1
500 TABLET ORAL 2 TIMES DAILY WITH MEALS
Qty: 20 TABLET | Refills: 0 | Status: SHIPPED | OUTPATIENT
Start: 2020-11-16 | End: 2020-11-26

## 2020-11-15 RX ORDER — KETOROLAC TROMETHAMINE 30 MG/ML
30 INJECTION, SOLUTION INTRAMUSCULAR; INTRAVENOUS
Status: COMPLETED | OUTPATIENT
Start: 2020-11-15 | End: 2020-11-15

## 2020-11-15 RX ADMIN — KETOROLAC TROMETHAMINE 30 MG: 30 INJECTION, SOLUTION INTRAMUSCULAR; INTRAVENOUS at 11:11

## 2020-11-15 NOTE — PATIENT INSTRUCTIONS
Please contact your Neurologist or Pain specialist regarding this new onset of back pain.    You received an injection of a powerful NSAID today (Toradol).  It's effects will last up to 24 hours. Please do not take another NSAID (ie aspirin, ibuprofen, Aleve, Advil or Motrin) until this time tonight.  If you continue to have pain, you may take Tylenol (acetaminophen) if you are not allergic to this medication.    General Neck and Back Pain     Both neck and back pain are usually caused by injury to the muscles or ligaments of the spine. Sometimes the disks that separate each bone of the spine may cause pain by pressing on a nearby nerve. Back and neck pain may appear after a sudden twisting or bending force (such as in a car accident), or sometimes after a simple awkward movement. In either case, muscle spasm is often present and adds to the pain.  Acute neck and back pain usually gets better in 1 to 2 weeks. Pain related to disk disease, arthritis in the spinal joints or spinal stenosis (narrowing of the spinal canal) can become chronic and last for months or years.  Back and neck pain are common problems. Most people feel better in 1 or 2 weeks, and most of the rest in 1 to 2 months. Most people can remain active.  People experience and describe pain differently.  · Pain can be sharp, stabbing, shooting, aching, cramping, or burning  · Movement, standing, bending, lifting, sitting, or walking may worsen the pain  · Pain can be localized to one spot or area, or it can be more generalized  · Pain can spread or radiate upwards, downwards, to the front, or go down your arms  · Muscle spasm may occur.  Most of the time mechanical problems with the muscles or spine cause the pain. it is usually caused by an injury, whether known or not, to the muscles or ligaments. While illnesses can cause back pain, it is usually not caused by a serious illness. Pain is usually related to physical activity, whether sports, exercise,  work, or normal activity. Sometimes it can occur without an identifiable cause. This can happen simply by stretching or moving wrong, without noting pain at the time. Other causes include:  · Overexertion, lifting, pushing, pulling incorrectly or too aggressively.  · Sudden twisting, bending or stretching from an accident (car or fall), or accidental movement.  · Poor posture  · Poor conditioning, lack of regular exercise  · Spinal disc disease or arthritis  · Stress  · Pregnancy, or illness like appendicitis, bladder or kidney infection, pelvic infections   Home care  · For neck pain: Use a comfortable pillow that supports the head and keeps the spine in a neutral position. The position of the head should not be tilted forward or backward.  · When in bed, try to find a position of comfort. A firm mattress is best. Try lying flat on your back with pillows under your knees. You can also try lying on your side with your knees bent up towards your chest and a pillow between your knees.  · At first, do not try to stretch out the sore spots. If there is a strain, it is not like the good soreness you get after exercising without an injury. In this case, stretching may make it worse.  · Avoid prolonged sitting, long car rides or travel. This puts more stress on the lower back than standing or walking.  · During the first 24 to 72 hours after an injury, apply an ice pack to the painful area for 20 minutes and then remove it for 20 minutes over a period of 60 to 90 minutes or several times a day.   · You can alternate ice and heat therapies. Talk with your healthcare provider about the best treatment for your back or neck pain. As a safety precaution, do not use a heating pad at bedtime. Sleeping with a heating pad can lead to skin burns or tissue damage.  · Therapeutic massage can help relax the back and neck muscles without stretching them.  · Be aware of safe lifting methods and do not lift anything over 15 pounds until  all the pain is gone.  Medications  Talk to your healthcare provider before using medicine, especially if you have other medical problems or are taking other medicines.  · You may use over-the-counter medicine to control pain, unless another pain medicine was prescribed. If you have chronic conditions like diabetes, liver or kidney disease, stomach ulcers,  gastrointestinal bleeding, or are taking blood thinner medicines.  · Be careful if you are given pain medicines, narcotics, or medicine for muscle spasm. They can cause drowsiness, and can affect your coordination, reflexes, and judgment. Do not drive or operate heavy machinery.  Follow-up care  Follow up with your healthcare provider, or as advised. Physical therapy or further tests may be needed.  If X-rays were taken, you will be notified of any new findings that may affect your care.  Call 911  Seek emergency medical care if any of the following occur:  · Trouble breathing  · Confusion  · Very drowsy or trouble awakening  · Fainting or loss of consciousness  · Rapid or very slow heart rate  · Loss of bowel or bladder control  When to seek medical advice  Call your healthcare provider right away if any of these occur:  · Pain becomes worse or spreads into your arms or legs  · Weakness, numbness or pain in one or both arms or legs  · Numbness in the groin area  · Difficulty walking  · Fever of 100.4ºF (38ºC) or higher, or as directed by your healthcare provider  Date Last Reviewed: 7/1/2016  © [id number] [employer]. [address] [place] [address], [place] [address]. All rights reserved. This information is not intended as a substitute for professional medical care. Always follow your healthcare professional's instructions.

## 2020-11-15 NOTE — PROGRESS NOTES
"Subjective:       Patient ID: Erik Mims is a 40 y.o. male.    Vitals:  height is 6' 2" (1.88 m) and weight is 74.8 kg (165 lb). His temperature is 98.8 °F (37.1 °C). His blood pressure is 142/88 (abnormal) and his pulse is 98. His respiration is 18 and oxygen saturation is 96%.     Chief Complaint: Back Pain    Pt has upper and lower back pain since last Tues. Pt took Motrin thsi morning    Provider note begins below:    Mr Mims comes to the clinic today with complaint upper back pain for 5 days.  Only complaint to this provider is upper back pain.  He is uses prescribed methocarbamol and over-the-counter ibuprofen with limited relief.    Patient endorses and "odd feeling" to his right arm with perhaps some paresthesias but no numbness.  No upper extremity drift on exam is and  strength is 5/5.    Patient has long-term chronic lower back issues as well as scoliosis.    Patient denies any injury or trauma precipitating current event.  States pain is worse with extension of neck.    Back Pain  This is a new problem. The current episode started in the past 7 days. The problem occurs constantly. The problem has been gradually worsening since onset. The pain is present in the costovertebral angle and lumbar spine. The quality of the pain is described as aching, burning, cramping, shooting and stabbing. Radiates to: shoulder blade and both arms. The pain is at a severity of 10/10. The pain is severe. The pain is the same all the time. The symptoms are aggravated by bending, coughing, position, lying down, sitting, standing, twisting, stress and urinating. Stiffness is present all day. Pertinent negatives include no abdominal pain, bladder incontinence, bowel incontinence, dysuria or numbness. Treatments tried: Motrin.       Constitution: Negative for fatigue.   Gastrointestinal: Negative for abdominal pain and bowel incontinence.   Genitourinary: Negative for dysuria, urgency, bladder incontinence and " hematuria.   Musculoskeletal: Positive for pain, joint pain, back pain and pain with walking. Negative for muscle cramps and history of spine disorder.   Skin: Negative for rash.   Neurological: Negative for coordination disturbances, numbness and tingling.       Objective:      Physical Exam   Constitutional: He is oriented to person, place, and time. He appears well-developed. He is cooperative.  Non-toxic appearance. He does not appear ill. No distress.   HENT:   Head: Normocephalic and atraumatic.   Ears:   Right Ear: Hearing normal.   Left Ear: Hearing normal.   Nose: Nose normal.   Mouth/Throat: Mucous membranes are normal.   Eyes: Conjunctivae and lids are normal.   Neck: Trachea normal, normal range of motion, full passive range of motion without pain and phonation normal. Neck supple.   Cardiovascular: Normal rate, regular rhythm, normal heart sounds and normal pulses.   Pulses:       Radial pulses are 2+ on the right side and 2+ on the left side.   Pulmonary/Chest: Effort normal and breath sounds normal. He has no decreased breath sounds. He has no wheezes.   Abdominal: Normal appearance.   Musculoskeletal:         General: No deformity.      Cervical back: He exhibits decreased range of motion, tenderness and spasm. He exhibits no bony tenderness, no swelling and no edema.   Neurological: He is alert and oriented to person, place, and time. He has normal strength and normal reflexes. He displays tremor. No sensory deficit. Gait and coordination normal.      Comments: Fine upper bilateral extremity tremor   Skin: Skin is warm, dry, intact and not diaphoretic. Psychiatric: His speech is normal and behavior is normal. Judgment and thought content normal.   Nursing note and vitals reviewed.        Assessment:       1. Cervicalgia    2. Back spasm        Plan:       Discussed follow-up with neurology or pain management regarding his new onset of upper pain.  Patient stated he will contact them tomorrow to  discuss.    Discussed continuing methocarbamol for back spasms as needed.    Cervicalgia  -     ketorolac injection 30 mg  -     naproxen (NAPROSYN) 500 MG tablet; Take 1 tablet (500 mg total) by mouth 2 (two) times daily with meals. for 10 days  Dispense: 20 tablet; Refill: 0    Back spasm      Patient Instructions     Please contact your Neurologist or Pain specialist regarding this new onset of back pain.    You received an injection of a powerful NSAID today (Toradol).  It's effects will last up to 24 hours. Please do not take another NSAID (ie aspirin, ibuprofen, Aleve, Advil or Motrin) until this time tonight.  If you continue to have pain, you may take Tylenol (acetaminophen) if you are not allergic to this medication.    General Neck and Back Pain     Both neck and back pain are usually caused by injury to the muscles or ligaments of the spine. Sometimes the disks that separate each bone of the spine may cause pain by pressing on a nearby nerve. Back and neck pain may appear after a sudden twisting or bending force (such as in a car accident), or sometimes after a simple awkward movement. In either case, muscle spasm is often present and adds to the pain.  Acute neck and back pain usually gets better in 1 to 2 weeks. Pain related to disk disease, arthritis in the spinal joints or spinal stenosis (narrowing of the spinal canal) can become chronic and last for months or years.  Back and neck pain are common problems. Most people feel better in 1 or 2 weeks, and most of the rest in 1 to 2 months. Most people can remain active.  People experience and describe pain differently.  · Pain can be sharp, stabbing, shooting, aching, cramping, or burning  · Movement, standing, bending, lifting, sitting, or walking may worsen the pain  · Pain can be localized to one spot or area, or it can be more generalized  · Pain can spread or radiate upwards, downwards, to the front, or go down your arms  · Muscle spasm may  occur.  Most of the time mechanical problems with the muscles or spine cause the pain. it is usually caused by an injury, whether known or not, to the muscles or ligaments. While illnesses can cause back pain, it is usually not caused by a serious illness. Pain is usually related to physical activity, whether sports, exercise, work, or normal activity. Sometimes it can occur without an identifiable cause. This can happen simply by stretching or moving wrong, without noting pain at the time. Other causes include:  · Overexertion, lifting, pushing, pulling incorrectly or too aggressively.  · Sudden twisting, bending or stretching from an accident (car or fall), or accidental movement.  · Poor posture  · Poor conditioning, lack of regular exercise  · Spinal disc disease or arthritis  · Stress  · Pregnancy, or illness like appendicitis, bladder or kidney infection, pelvic infections   Home care  · For neck pain: Use a comfortable pillow that supports the head and keeps the spine in a neutral position. The position of the head should not be tilted forward or backward.  · When in bed, try to find a position of comfort. A firm mattress is best. Try lying flat on your back with pillows under your knees. You can also try lying on your side with your knees bent up towards your chest and a pillow between your knees.  · At first, do not try to stretch out the sore spots. If there is a strain, it is not like the good soreness you get after exercising without an injury. In this case, stretching may make it worse.  · Avoid prolonged sitting, long car rides or travel. This puts more stress on the lower back than standing or walking.  · During the first 24 to 72 hours after an injury, apply an ice pack to the painful area for 20 minutes and then remove it for 20 minutes over a period of 60 to 90 minutes or several times a day.   · You can alternate ice and heat therapies. Talk with your healthcare provider about the best treatment  for your back or neck pain. As a safety precaution, do not use a heating pad at bedtime. Sleeping with a heating pad can lead to skin burns or tissue damage.  · Therapeutic massage can help relax the back and neck muscles without stretching them.  · Be aware of safe lifting methods and do not lift anything over 15 pounds until all the pain is gone.  Medications  Talk to your healthcare provider before using medicine, especially if you have other medical problems or are taking other medicines.  · You may use over-the-counter medicine to control pain, unless another pain medicine was prescribed. If you have chronic conditions like diabetes, liver or kidney disease, stomach ulcers,  gastrointestinal bleeding, or are taking blood thinner medicines.  · Be careful if you are given pain medicines, narcotics, or medicine for muscle spasm. They can cause drowsiness, and can affect your coordination, reflexes, and judgment. Do not drive or operate heavy machinery.  Follow-up care  Follow up with your healthcare provider, or as advised. Physical therapy or further tests may be needed.  If X-rays were taken, you will be notified of any new findings that may affect your care.  Call 911  Seek emergency medical care if any of the following occur:  · Trouble breathing  · Confusion  · Very drowsy or trouble awakening  · Fainting or loss of consciousness  · Rapid or very slow heart rate  · Loss of bowel or bladder control  When to seek medical advice  Call your healthcare provider right away if any of these occur:  · Pain becomes worse or spreads into your arms or legs  · Weakness, numbness or pain in one or both arms or legs  · Numbness in the groin area  · Difficulty walking  · Fever of 100.4ºF (38ºC) or higher, or as directed by your healthcare provider  Date Last Reviewed: 7/1/2016  © 5038-5402 [employer]. [address] [place] [address], [place] [address]. All rights reserved. This information is not intended as a  substitute for professional medical care. Always follow your healthcare professional's instructions.

## 2020-11-16 ENCOUNTER — PATIENT MESSAGE (OUTPATIENT)
Dept: PAIN MEDICINE | Facility: CLINIC | Age: 40
End: 2020-11-16

## 2020-11-18 ENCOUNTER — OFFICE VISIT (OUTPATIENT)
Dept: PAIN MEDICINE | Facility: CLINIC | Age: 40
End: 2020-11-18
Payer: COMMERCIAL

## 2020-11-18 VITALS
RESPIRATION RATE: 18 BRPM | BODY MASS INDEX: 20.6 KG/M2 | HEART RATE: 108 BPM | OXYGEN SATURATION: 100 % | SYSTOLIC BLOOD PRESSURE: 132 MMHG | HEIGHT: 74 IN | WEIGHT: 160.5 LBS | DIASTOLIC BLOOD PRESSURE: 80 MMHG

## 2020-11-18 DIAGNOSIS — M47.816 LUMBAR SPONDYLOSIS: ICD-10-CM

## 2020-11-18 DIAGNOSIS — M54.12 CERVICAL RADICULOPATHY: Primary | ICD-10-CM

## 2020-11-18 DIAGNOSIS — M54.12 RADICULOPATHY, CERVICAL REGION: ICD-10-CM

## 2020-11-18 DIAGNOSIS — M51.36 DDD (DEGENERATIVE DISC DISEASE), LUMBAR: ICD-10-CM

## 2020-11-18 DIAGNOSIS — M62.838 MUSCLE SPASM: ICD-10-CM

## 2020-11-18 PROCEDURE — 99999 PR PBB SHADOW E&M-EST. PATIENT-LVL III: CPT | Mod: PBBFAC,,, | Performed by: NURSE PRACTITIONER

## 2020-11-18 PROCEDURE — 3079F PR MOST RECENT DIASTOLIC BLOOD PRESSURE 80-89 MM HG: ICD-10-PCS | Mod: CPTII,S$GLB,, | Performed by: NURSE PRACTITIONER

## 2020-11-18 PROCEDURE — 99213 OFFICE O/P EST LOW 20 MIN: CPT | Mod: S$GLB,,, | Performed by: NURSE PRACTITIONER

## 2020-11-18 PROCEDURE — 1125F AMNT PAIN NOTED PAIN PRSNT: CPT | Mod: S$GLB,,, | Performed by: NURSE PRACTITIONER

## 2020-11-18 PROCEDURE — 3008F PR BODY MASS INDEX (BMI) DOCUMENTED: ICD-10-PCS | Mod: CPTII,S$GLB,, | Performed by: NURSE PRACTITIONER

## 2020-11-18 PROCEDURE — 3008F BODY MASS INDEX DOCD: CPT | Mod: CPTII,S$GLB,, | Performed by: NURSE PRACTITIONER

## 2020-11-18 PROCEDURE — 99999 PR PBB SHADOW E&M-EST. PATIENT-LVL III: ICD-10-PCS | Mod: PBBFAC,,, | Performed by: NURSE PRACTITIONER

## 2020-11-18 PROCEDURE — 3075F SYST BP GE 130 - 139MM HG: CPT | Mod: CPTII,S$GLB,, | Performed by: NURSE PRACTITIONER

## 2020-11-18 PROCEDURE — 3075F PR MOST RECENT SYSTOLIC BLOOD PRESS GE 130-139MM HG: ICD-10-PCS | Mod: CPTII,S$GLB,, | Performed by: NURSE PRACTITIONER

## 2020-11-18 PROCEDURE — 1125F PR PAIN SEVERITY QUANTIFIED, PAIN PRESENT: ICD-10-PCS | Mod: S$GLB,,, | Performed by: NURSE PRACTITIONER

## 2020-11-18 PROCEDURE — 3079F DIAST BP 80-89 MM HG: CPT | Mod: CPTII,S$GLB,, | Performed by: NURSE PRACTITIONER

## 2020-11-18 PROCEDURE — 99213 PR OFFICE/OUTPT VISIT, EST, LEVL III, 20-29 MIN: ICD-10-PCS | Mod: S$GLB,,, | Performed by: NURSE PRACTITIONER

## 2020-11-18 RX ORDER — GABAPENTIN 300 MG/1
300 CAPSULE ORAL 3 TIMES DAILY
Qty: 90 CAPSULE | Refills: 1 | Status: SHIPPED | OUTPATIENT
Start: 2020-11-18 | End: 2021-02-17 | Stop reason: SDUPTHER

## 2020-11-18 NOTE — PROGRESS NOTES
Chronic patient Established Note (Follow up visit)           SUBJECTIVE:    Interval History 11/18/2020:  The patient is here to discuss neck, upper back, and right arm pain.  He reports that he has had pain for months which was mild.  However, he had a recent onset of increased lower neck and upper back pain with radiation down the lateral aspect of the right arm to the hand.  He reports tingling to all digits of the hand intermittently.  He did have a recent urgent care visit and was diagnosed with cervicalgia.  He did not have imaging at that time.  He had a Toradol shot and was given naproxen which is providing minimal benefit.  Previously discussed back and hip pain or tolerable at this time.  He wishes to focus on his upper pain.  His pain today is 8/10.    Interval History 11/3/2020:  The patient returns to clinic today for follow up of low back pain. He is s/p left L4/5 and L5/S1 TF ISABELLA on 10/19/2020. He reports 80% relief of his low back and left leg pain. He denies any low back or radicular leg pain at this time. He does report left hip pain. This pain is worse with standing, walking, and activity. He reports that he has had this pain since being in the . He does take Robaxin as needed with benefit. He denies any other health changes. His pain today is 3/10.    Interval History 10/1/2020:  The patient returns to clinic today for follow up of low back pain. He reports increased low back pain that radiates into the posterolateral aspect of his left leg to his knee. He reports intermittent radiating pain to the top of his left foot. He describes this pain as shooting and tingling. He denies any right leg pain. His pain is worse with activity. He did go to Urgent Care recently where he was given Toradol, Flexeril, and a Medrol pack. He did not take the medrol pack. He does report some benefit with Toradol and Flexeril. He denies any weakness, bowel or bladder incontinence. He denies any other health  changes. His pain today is 8/10.    Interval History 8/14/2019:  Erik Mims presents to the clinic for a follow-up appointment for low back pain. He reports that his low back and leg pain is much improved since last visit. He reports intermittent low back pain that is aching in nature. He reports intermittent radiating pain down the posterior aspect of his thighs. He has completed 2 physical therapy sessions at this time. He continues to take Robaxin as needed. He denies any other health changes. He denies any bowel or bladder incontinence. Since the last visit, Erik Mims states the pain has been improving. Current pain intensity is 3/10.    Pain Disability Index Review:  Last 3 PDI Scores 11/18/2020 10/1/2020 8/14/2019   Pain Disability Index (PDI) 51 40 19       Pain Medications:  Naproxen    Opioid Contract: no     report:  Reviewed and consistent with medication use as prescribed.    Pain Procedures:   10/19/2020- Left L4/5 and L5/S1 TF ISABELLA- 80% relief    Physical Therapy/Home Exercise: yes    Imaging:   MRI Lumbar Spine 7/10/2019:  COMPARISON:  Lumbar spine radiograph dated 07/09/2019    FINDINGS:  Lumbar spine vertebral body heights and alignment are maintained.  No marrow replacing process.  Degenerative endplate changes and mild edema at L5-S1.  Spinal cord terminus lies at L1-L2.  Prevertebral and paraspinal soft tissues are unremarkable.    T12-L1: No significant posterior disc herniation, spinal canal stenosis, or neural foraminal impingement.    L1-L2: No significant posterior disc herniation, spinal canal stenosis, or neural foraminal impingement.    L2-L3: Flavum thickening with mild facet DJD.  No significant neural foraminal narrowing or spinal canal stenosis.    L3-L4: Flavum thickening with mild facet DJD.  No significant neural foraminal narrowing or spinal canal stenosis.    L4-L5: There is disc desiccation with circumferential bulge and superimposed central protrusion  resulting in partial collapse of the anterior thecal sac.  This in combination with flavum thickening and facet DJD results in overall mild central canal stenosis with lateral recess narrowing and probable contact of the transiting left L5 nerve root.  No significant neural foraminal impingement.    L5-S1: There is disc desiccation with height loss and posterior annular fissure.  Circumferential bulge with small central protrusion without significant spinal canal stenosis or neural foraminal impingement.  Bilateral facet DJD with trace effusions.      Impression       Lower lumbar spondylosis resulting in mild central canal stenosis at L4-L5 with lateral recess narrowing and probable contact of the left transiting L5 nerve root.    Trace bilateral facet effusions at L5-S1.     Xray Lumbar Spine 7/9/2019:  FINDINGS:  Mild lumbar scoliosis.  The lumbosacral disc space is narrowed.  The other disc spaces are well maintained.  No fracture, spondylolisthesis or bone destruction identified      Impression       See above         Allergies: Review of patient's allergies indicates:  No Known Allergies    Current Medications:   Current Outpatient Medications   Medication Sig Dispense Refill    DULoxetine (CYMBALTA) 60 MG capsule TAKE 1 CAPSULE BY MOUTH DAILY 90 capsule 0    FLUCELVAX QUAD 4874-2219, PF, 60 mcg (15 mcg x 4)/0.5 mL Syrg PHARMACY ADMINISTERED      lisinopriL-hydrochlorothiazide (PRINZIDE,ZESTORETIC) 20-12.5 mg per tablet TAKE 1 TABLET BY MOUTH DAILY 90 tablet 0    naproxen (NAPROSYN) 500 MG tablet Take 1 tablet (500 mg total) by mouth 2 (two) times daily with meals. for 10 days 20 tablet 0    rOPINIRole (REQUIP) 0.25 MG tablet Take 1 tablet (0.25 mg total) by mouth nightly as needed. Further refills require visit with Dr. Dang 90 tablet 0    HYDROcodone-acetaminophen (NORCO)  mg per tablet Take 1 tablet by mouth every 6 (six) hours as needed for Pain. 21 tablet 0    ketorolac (TORADOL) 10 mg  tablet Take one po q 8 hrs prn pain 30 tablet 0    methocarbamoL (ROBAXIN) 750 MG Tab TAKE 1 TABLET(750 MG) BY MOUTH THREE TIMES DAILY 90 tablet 0    nitroGLYCERIN (NITROSTAT) 0.4 MG SL tablet Place 1 tablet (0.4 mg total) under the tongue every 5 (five) minutes as needed for Chest pain. 20 tablet 12    predniSONE (DELTASONE) 10 MG tablet Take 2 po bid x 3 days, one bid x 3 days, then one daily till finish 30 tablet 0     No current facility-administered medications for this visit.      Facility-Administered Medications Ordered in Other Visits   Medication Dose Route Frequency Provider Last Rate Last Dose    fentaNYL injection 100 mcg  100 mcg Intravenous On Call Procedure Kenny Vogt MD        midazolam (VERSED) 1 mg/mL injection 5 mg  5 mg Intravenous On Call Procedure Kenny Vogt MD           REVIEW OF SYSTEMS:    GENERAL:  No weight loss, malaise or fevers.  HEENT:  Negative for frequent or significant headaches.  NECK:  Negative for lumps, goiter, pain and significant neck swelling.  RESPIRATORY:  Negative for cough, wheezing or shortness of breath. COPD  CARDIOVASCULAR:  Negative for chest pain, leg swelling or palpitations. HTN  GI:  Negative for abdominal discomfort, blood in stools or black stools or change in bowel habits.  MUSCULOSKELETAL:  See HPI.  SKIN:  Negative for lesions, rash, and itching.  PSYCH:  Negative for sleep disturbance, mood disorder and recent psychosocial stressors.  HEMATOLOGY/LYMPHOLOGY:  Negative for prolonged bleeding, bruising easily or swollen nodes.  NEURO:   No history of headaches, syncope, paralysis, seizures or tremors.  All other reviewed and negative other than HPI.    Past Medical History:  Past Medical History:   Diagnosis Date    COPD (chronic obstructive pulmonary disease)     Hypertension     Syncope     Tobacco use 7/20/2017       Past Surgical History:  Past Surgical History:   Procedure Laterality Date    LAPAROSCOPIC  CHOLECYSTECTOMY WITH CHOLANGIOGRAPHY N/A 10/2/2019    Procedure: CHOLECYSTECTOMY, LAPAROSCOPIC, WITH CHOLANGIOGRAM;  Surgeon: Dylan Licona MD;  Location[facility];  Service: General;  Laterality: N/A;    LUNG SURGERY      TRANSFORAMINAL EPIDURAL INJECTION OF STEROID Left 10/19/2020    Procedure: INJECTION, STEROID, EPIDURAL, TRANSFORAMINAL APPROACH, L4-L5 AND L5-S1;  Surgeon: Farrah Leon MD;  [facility];  Service: Pain Management;  Laterality: Left;       Family History:  History reviewed. No pertinent family history.    Social History:  Social History     Socioeconomic History    Marital status: Single     Spouse name: Not on file    Number of children: Not on file    Years of education: Not on file    Highest education level: Not on file   Occupational History    Not on file   Social Needs    Financial resource strain: Not on file    Food insecurity     Worry: Not on file     Inability: Not on file    Transportation needs     Medical: Not on file     Non-medical: Not on file   Tobacco Use    Smoking status: Current Every Day Smoker     Packs/day: 1.00     Years: 25.00     Pack years: 25.00     Start date: 1994    Smokeless tobacco: Never Used    Tobacco comment: Handout provided for 1-800-QUIT-NOW smoking cessation program.    Substance and Sexual Activity    Alcohol use: Yes    Drug use: No    Sexual activity: Not on file   Lifestyle    Physical activity     Days per week: Not on file     Minutes per session: Not on file    Stress: Not on file   Relationships    Social connections     Talks on phone: Not on file     Gets together: Not on file     Attends Sabianist service: Not on file     Active member of club or organization: Not on file     Attends meetings of clubs or organizations: Not on file     Relationship status: Not on file   Other Topics Concern    Not on file   Social History Narrative    Not on file       OBJECTIVE:    /80   Pulse 108   Resp 18   Ht  "6' 2" (1.88 m)   Wt 72.8 kg (160 lb 7.9 oz)   SpO2 100%   BMI 20.61 kg/m²     PHYSICAL EXAMINATION:    General appearance: Well appearing, in no acute distress, alert and oriented x3.  Psych:  Mood and affect appropriate.  Skin: Skin color, texture, turgor normal, no rashes or lesions, in both upper and lower body.  Head/face:  Atraumatic, normocephalic. No palpable lymph nodes  Neck: No TTP over cervical spine or surrounding muscles.  Limited ROM with pain on cervical flexion and lateral rotation.  Spurling is positive on the right.  Negative facet loading.  Extremities: Peripheral joint ROM is full and pain free without obvious instability or laxity in all four extremities. No deformities, edema, or skin discoloration. Good capillary refill.  Musculoskeletal: Bilateral lower extremity strength is normal and symmetric.  No atrophy or tone abnormalities are noted.  Neuro: Bilateral lower extremity coordination and muscle stretch reflexes are physiologic and symmetric.  Decreased sensation to right upper extremity.  Gait: Normal.    ASSESSMENT: 40 y.o. year old male with low back pain, consistent with the followin. Lumbar radiculopathy     2. Radiculopathy, cervical region  MRI Cervical Spine Without Contrast    X-Ray Cervical Spine 5 View With Flex And Ext    X-Ray Thoracic Spine AP Lateral   3. DDD (degenerative disc disease), lumbar     4. Muscle spasm     5. Lumbar spondylosis           PLAN:     - Order cervical XRAYs and MRI given radicular symptoms.    - Tentatively plan for C7/T1 IL ISABELLA.  Will review imaging prior.    - Continue Robaxin PRN.     - Start Gabapentin to increase to 300 mg TID as tolerated.    - Continue home exercise routine.     - RTC 2 weeks after ISABELLA.    - Counseled patient regarding the importance of activity modification, constant sleeping habits and physical therapy.        The above plan and management options were discussed at length with patient. Patient is in agreement with " the above and verbalized understanding.    India Mccoy, PATT  11/18/2020

## 2020-11-24 ENCOUNTER — TELEPHONE (OUTPATIENT)
Dept: PAIN MEDICINE | Facility: CLINIC | Age: 40
End: 2020-11-24

## 2020-11-24 ENCOUNTER — OFFICE VISIT (OUTPATIENT)
Dept: FAMILY MEDICINE | Facility: CLINIC | Age: 40
End: 2020-11-24
Payer: COMMERCIAL

## 2020-11-24 DIAGNOSIS — I10 ESSENTIAL HYPERTENSION: Primary | ICD-10-CM

## 2020-11-24 DIAGNOSIS — F41.9 ANXIETY: ICD-10-CM

## 2020-11-24 PROCEDURE — 99214 PR OFFICE/OUTPT VISIT, EST, LEVL IV, 30-39 MIN: ICD-10-PCS | Mod: 95,,, | Performed by: FAMILY MEDICINE

## 2020-11-24 PROCEDURE — 99214 OFFICE O/P EST MOD 30 MIN: CPT | Mod: 95,,, | Performed by: FAMILY MEDICINE

## 2020-11-24 RX ORDER — DULOXETIN HYDROCHLORIDE 60 MG/1
60 CAPSULE, DELAYED RELEASE ORAL DAILY
Qty: 90 CAPSULE | Refills: 0 | Status: SHIPPED | OUTPATIENT
Start: 2020-11-24 | End: 2021-04-12 | Stop reason: SDUPTHER

## 2020-11-24 RX ORDER — LISINOPRIL AND HYDROCHLOROTHIAZIDE 12.5; 2 MG/1; MG/1
1 TABLET ORAL DAILY
Qty: 90 TABLET | Refills: 0 | Status: SHIPPED | OUTPATIENT
Start: 2020-11-24 | End: 2021-04-12 | Stop reason: SDUPTHER

## 2020-11-24 NOTE — TELEPHONE ENCOUNTER
Staff called patient regarding rescheduling 11/25/20 2:15 pm and 2:30 pm xray's to today in the evening or tomorrow before 11 am as his peer to peer is scheduled for tomorrow 11/25/20 12:45 pm and the xrays have to be had before the peer to peer or the MRI will be denied as well as upcoming procedure.     Patient did not answer therefore staff left a detailed voice message informing the patient of the above information.

## 2020-11-24 NOTE — PROGRESS NOTES
The patient location is: home  The chief complaint leading to consultation is:  No chief complaint on file.    Visit type: Virtual visit with synchronous audio and video  Total time spent with patient: 12 min  Each patient to whom he or she provides medical services by telemedicine is:  (1) informed of the relationship between the physician and patient and the respective role of any other health care provider with respect to management of the patient; and (2) notified that he or she may decline to receive medical services by telemedicine and may withdraw from such care at any time.    (Portions of this note were dictated using voice recognition software and may contain dictation related errors in spelling/grammar/syntax not found on text review)         HPI: 40 y.o. male patient of Dr. Dang who was last seen by him in July 2019.  Has hypertension on lisinopril HCT 20/12.5 daily.  He also takes duloxetine for anxiety 60 mg daily.  He requested refills and found out that his PCP is no longer with [facility], so he set up a virtual visit today to get these refills done.  He did have a recent visit with pain management in his blood pressure was normal although was mildly suboptimal on 2 prior visits.      He feels like duloxetine 60 mg helps well with anxiety    He is also on gabapentin through pain management, working with pain management on further lumbar imaging and intervention.    Takes Requip once in a while for restless leg but gives him with a dream so he does not like to take this if he does not have to    Smokes 1 pack a day chronically    Alcohol:  4-5 beers per day, pre contemplative about quitting    Labs last year showed significant LFT elevation, patient states that this was related to choledocholithiasis any his gallbladder removed.  Denies any current pain or nausea or vomiting.  Has had no recent labs since then.        Past Medical History:   Diagnosis Date    COPD (chronic obstructive pulmonary  disease)     Hypertension     Syncope     Tobacco use 7/20/2017       Past Surgical History:   Procedure Laterality Date    LAPAROSCOPIC CHOLECYSTECTOMY WITH CHOLANGIOGRAPHY N/A 10/2/2019    Procedure: CHOLECYSTECTOMY, LAPAROSCOPIC, WITH CHOLANGIOGRAM;  Surgeon: Dylan Licona MD;  Location: [facility] OR;  Service: General;  Laterality: N/A;    LUNG SURGERY      TRANSFORAMINAL EPIDURAL INJECTION OF STEROID Left 10/19/2020    Procedure: INJECTION, STEROID, EPIDURAL, TRANSFORAMINAL APPROACH, L4-L5 AND L5-S1;  Surgeon: Farrah Leon MD;  Location: [facility] PAIN [facility]GT;  Service: Pain Management;  Laterality: Left;       No family history on file.    Social History     Tobacco Use    Smoking status: Current Every Day Smoker     Packs/day: 1.00     Years: 25.00     Pack years: 25.00     Start date: 1994    Smokeless tobacco: Never Used    Tobacco comment: Handout provided for 1-800-QUIT-NOW smoking cessation program.    Substance Use Topics    Alcohol use: Yes    Drug use: No       Lab Results   Component Value Date    WBC 8.50 10/01/2019    HGB 15.1 10/01/2019    HCT 43.8 10/01/2019     (H) 10/01/2019     10/01/2019    CHOL 230 (H) 05/07/2019    TRIG 101 05/07/2019    HDL 87 (H) 05/07/2019     (H) 10/02/2019     (H) 10/02/2019    BILITOT 4.8 (H) 10/02/2019    ALKPHOS 80 10/02/2019     10/02/2019    K 3.7 10/02/2019     10/02/2019    CREATININE 0.8 10/02/2019    ESTGFRAFRICA >60 10/02/2019    EGFRNONAA >60 10/02/2019    CALCIUM 9.9 10/02/2019    ALBUMIN 4.2 10/02/2019    BUN 9 10/02/2019    CO2 25 10/02/2019    TSH 0.506 07/13/2017    LDLCALC 122.8 05/07/2019    GLU 92 10/02/2019                 ROS:  GENERAL: No fever, chills, fatigability or weight loss.  SKIN: No rashes, no itching.  HEAD: No headaches.  EYES: No visual changes  EARS: No ear pain or changes in hearing.  NOSE: No congestion or rhinorrhea.  MOUTH & THROAT: No hoarseness, change in voice, or sore throat.  NODES:  Denies swollen glands.  CHEST: Denies REBOLLEDO, cyanosis, wheezing, cough and sputum production.  CARDIOVASCULAR: Denies chest pain, PND, orthopnea.  ABDOMEN: No nausea, vomiting, or changes in bowel function.  URINARY: No flank pain, dysuria or hematuria.  PERIPHERAL VASCULAR: No claudication or cyanosis.  MUSCULOSKELETAL: No joint stiffness or swelling. Denies back pain.  NEUROLOGIC: No weakness or numbness.       PE (elements able to be captured on virtual encounter)  APPEARANCE: Well nourished, well developed, in no acute distress.    HEAD: Normocephalic, atraumatic.  EYES:  .   Conjunctivae noninjected.  RESPIRATORY:  No increased work of breathing or objective visual signs of dyspnea  PSYCHIATRIC:  Normal mood and affect, alert and oriented, appropriate answers to questions      IMPRESSION  1. Essential hypertension    2. Anxiety            PLAN  Hypertension:  Refill lisinopril hydrochlorothiazide 20/12.5 mg daily.  Emphasize importance of tobacco cessation and reduction of alcohol.  Would recommend 2 month follow-up in the office for further evaluation.  Will set up labs below    Anxiety:  Refill duloxetine.  Emphasize importance of regular PCP follow-up.  Follow-up in 2 months    Continue working with pain management on his lumbar pain issues      Orders Placed This Encounter   Procedures    CBC Auto Differential    Comprehensive Metabolic Panel    Lipid Panel    TSH             Answers for HPI/ROS submitted by the patient on 11/24/2020   activity change: No  unexpected weight change: No  neck pain: Yes  hearing loss: No  rhinorrhea: No  trouble swallowing: No  eye discharge: No  visual disturbance: No  chest tightness: No  wheezing: No  chest pain: No  palpitations: No  blood in stool: No  constipation: No  vomiting: No  diarrhea: No  polydipsia: No  polyuria: No  difficulty urinating: No  urgency: No  hematuria: No  joint swelling: No  arthralgias: No  headaches: No  weakness: No  confusion: No  dysphoric  mood: No

## 2020-11-24 NOTE — TELEPHONE ENCOUNTER
Staff contacted the patient to schedule both xray's that was ordered by mnaisha mohr NP.     Patient accepted an appointment at [facility] 11/25/20 2:15 pm and 2:30 pm.

## 2020-11-24 NOTE — TELEPHONE ENCOUNTER
Sarah [facility] Staff             Request for MRI/CERVICAL have been Denied   [address] MRI Cervical Spine, (spinal canal and contents); without contrast material   Denied Based on [employer] [facility] Imaging Guidelines Section(s): SP 3.1 Neck (Cervical Spine) Pain without and with Neurological Features (Including Stenosis), we cannot approve this request. Your records show that you have neck pain. The reason this request cannot be approved is because: 1. The clinical information reviewed shows that the same test or one similar to the requested study was previously performed. The results of this prior imaging were provided, but they do not show why these results are not sufficient for the evaluation of the current clinical condition. Additional imaging is not supported without a clear reason why it is needed and, therefore, the request is not indicated at this time.     India Mccoy can engage in a physician peer to peer review by calling   [employer] @ [phone] opt 4 and please give case # [id number]   Please call Sarah @ [phone] or IM me once the   Peer to Peer review have been completed on the case   Please advise, thanks

## 2020-11-24 NOTE — TELEPHONE ENCOUNTER
"Staff called [employer] ([phone] option #4) to set up a peer to peer for denied Cervical MRI ordered by India Mccoy NP. Case#[id number] Member ID:[id number]. Staff is requesting to be scheduled for 11/27/20 before 3 pm.     Alondra KUMAR States, "We are closed Thursday and Friday the next available appointment will be 11/25/20 12:45 pm this is all that available currently.     Staff accepted and sent a reminder message through remind me.   "

## 2020-11-25 ENCOUNTER — TELEPHONE (OUTPATIENT)
Dept: PAIN MEDICINE | Facility: CLINIC | Age: 40
End: 2020-11-25

## 2020-11-25 ENCOUNTER — PATIENT MESSAGE (OUTPATIENT)
Dept: PAIN MEDICINE | Facility: CLINIC | Age: 40
End: 2020-11-25

## 2020-11-25 NOTE — TELEPHONE ENCOUNTER
Staff contacted [employer] to reschedule today's peer to peer for a later date as the patient has not responded to getting his imaging scheduled for a early time so that results would be ready for peer to peer.     Peer to peer rescheduled to 11/25/20 3:30 pm.    Staff verbalized understanding and expressed thanks.

## 2020-11-30 ENCOUNTER — TELEPHONE (OUTPATIENT)
Dept: ADMINISTRATIVE | Facility: OTHER | Age: 40
End: 2020-11-30

## 2020-11-30 NOTE — TELEPHONE ENCOUNTER
----- Message from PATT Hodge sent at 11/30/2020 11:43 AM CST -----  Regarding: cancel procedure  Because he no showed for the XRAYs and they therefore denied the MRI.  We tried to call him multiple times last week.  Just cancel the procedure and he can call us back when he wants to reschedule XRAYs.  ----- Message -----  From: Tom Garcias MA  Sent: 11/30/2020  11:38 AM CST  To: Ailyn Morales, PATT Hodge      ----- Message -----  From: Ailyn Morales  Sent: 11/30/2020  11:29 AM CST  To: Carolyn Chung    Good Morning,     Authorization for this pt's procedure on 12/3 has been denied by [employer].     They stated the case was denied because documentation does not show that advanced imaging was performed with 24 months prior to initial cervical/thoracic interlaminar injection and/or transformainal steroid injection.     #Note- imaging from dates 7/10/2019 and 7/19/2019 were submitted.     A peer to peer can be scheduled by calling [phone], option 4.     How would the dr like to proceed with this case?

## 2020-12-18 NOTE — TELEPHONE ENCOUNTER
----- Message from PATT Milligan sent at 9/27/2017  2:20 PM CDT -----  Let him know that the US shows the liver looks consistent with fatty liver disease.  Instruct pt on the importance of control of blood sugar, cholesterol, healthy weight and diet, avoid alcohol.  Should repeat labs and US in 6 months, please make recall for labs and US .    He also has polyps in the gallbladder.      Do not think that either of these explain his complaints.      We can make him an appt to see surgeon for monitoring/recommendations re: gallbladder polyps   AND he also needs to schedule EGD that we discussed at his office visit.     son

## 2021-05-18 ENCOUNTER — PATIENT MESSAGE (OUTPATIENT)
Dept: PAIN MEDICINE | Facility: CLINIC | Age: 41
End: 2021-05-18

## 2021-05-18 ENCOUNTER — HOSPITAL ENCOUNTER (OUTPATIENT)
Dept: RADIOLOGY | Facility: OTHER | Age: 41
Discharge: HOME | End: 2021-05-18
Attending: NURSE PRACTITIONER
Payer: COMMERCIAL

## 2021-05-18 ENCOUNTER — OFFICE VISIT (OUTPATIENT)
Dept: PAIN MEDICINE | Facility: CLINIC | Age: 41
End: 2021-05-18
Payer: COMMERCIAL

## 2021-05-18 ENCOUNTER — TELEPHONE (OUTPATIENT)
Dept: PAIN MEDICINE | Facility: CLINIC | Age: 41
End: 2021-05-18

## 2021-05-18 VITALS
SYSTOLIC BLOOD PRESSURE: 133 MMHG | WEIGHT: 160.5 LBS | BODY MASS INDEX: 20.6 KG/M2 | HEART RATE: 91 BPM | HEIGHT: 74 IN | DIASTOLIC BLOOD PRESSURE: 89 MMHG

## 2021-05-18 DIAGNOSIS — M54.12 CERVICAL RADICULOPATHY: ICD-10-CM

## 2021-05-18 DIAGNOSIS — M54.2 CERVICALGIA: ICD-10-CM

## 2021-05-18 DIAGNOSIS — M47.812 CERVICAL SPONDYLOSIS: ICD-10-CM

## 2021-05-18 DIAGNOSIS — M54.12 CERVICAL RADICULOPATHY: Primary | ICD-10-CM

## 2021-05-18 PROCEDURE — 1125F AMNT PAIN NOTED PAIN PRSNT: CPT | Mod: S$GLB,,, | Performed by: NURSE PRACTITIONER

## 2021-05-18 PROCEDURE — 99213 OFFICE O/P EST LOW 20 MIN: CPT | Mod: S$GLB,,, | Performed by: NURSE PRACTITIONER

## 2021-05-18 PROCEDURE — 99213 PR OFFICE/OUTPT VISIT, EST, LEVL III, 20-29 MIN: ICD-10-PCS | Mod: S$GLB,,, | Performed by: NURSE PRACTITIONER

## 2021-05-18 PROCEDURE — 72052 XR CERVICAL SPINE 5 VIEW WITH FLEX AND EXT: ICD-10-PCS | Mod: 26,,, | Performed by: RADIOLOGY

## 2021-05-18 PROCEDURE — 99999 PR PBB SHADOW E&M-EST. PATIENT-LVL III: CPT | Mod: PBBFAC,,, | Performed by: NURSE PRACTITIONER

## 2021-05-18 PROCEDURE — 72052 X-RAY EXAM NECK SPINE 6/>VWS: CPT | Mod: TC,FY

## 2021-05-18 PROCEDURE — 3008F PR BODY MASS INDEX (BMI) DOCUMENTED: ICD-10-PCS | Mod: CPTII,S$GLB,, | Performed by: NURSE PRACTITIONER

## 2021-05-18 PROCEDURE — 1125F PR PAIN SEVERITY QUANTIFIED, PAIN PRESENT: ICD-10-PCS | Mod: S$GLB,,, | Performed by: NURSE PRACTITIONER

## 2021-05-18 PROCEDURE — 99999 PR PBB SHADOW E&M-EST. PATIENT-LVL III: ICD-10-PCS | Mod: PBBFAC,,, | Performed by: NURSE PRACTITIONER

## 2021-05-18 PROCEDURE — 3008F BODY MASS INDEX DOCD: CPT | Mod: CPTII,S$GLB,, | Performed by: NURSE PRACTITIONER

## 2021-05-18 PROCEDURE — 72052 X-RAY EXAM NECK SPINE 6/>VWS: CPT | Mod: 26,,, | Performed by: RADIOLOGY

## 2021-05-18 RX ORDER — GABAPENTIN 300 MG/1
300 CAPSULE ORAL 2 TIMES DAILY
Qty: 60 CAPSULE | Refills: 2 | Status: SHIPPED | OUTPATIENT
Start: 2021-05-18

## 2021-05-18 RX ORDER — METHOCARBAMOL 500 MG/1
500 TABLET, FILM COATED ORAL 3 TIMES DAILY PRN
Qty: 30 TABLET | Refills: 1 | Status: SHIPPED | OUTPATIENT
Start: 2021-05-18 | End: 2021-05-28

## 2021-05-18 RX ORDER — METHYLPREDNISOLONE 4 MG/1
TABLET ORAL
Qty: 1 PACKAGE | Refills: 0 | Status: SHIPPED | OUTPATIENT
Start: 2021-05-18 | End: 2021-06-08

## 2021-05-19 ENCOUNTER — PATIENT MESSAGE (OUTPATIENT)
Dept: PAIN MEDICINE | Facility: CLINIC | Age: 41
End: 2021-05-19

## 2021-05-19 ENCOUNTER — CLINICAL SUPPORT (OUTPATIENT)
Dept: REHABILITATION | Facility: HOSPITAL | Age: 41
End: 2021-05-19
Payer: COMMERCIAL

## 2021-05-19 ENCOUNTER — OFFICE VISIT (OUTPATIENT)
Dept: PAIN MEDICINE | Facility: CLINIC | Age: 41
End: 2021-05-19
Payer: COMMERCIAL

## 2021-05-19 VITALS
BODY MASS INDEX: 21.27 KG/M2 | OXYGEN SATURATION: 99 % | HEART RATE: 80 BPM | SYSTOLIC BLOOD PRESSURE: 145 MMHG | WEIGHT: 165.69 LBS | HEIGHT: 74 IN | DIASTOLIC BLOOD PRESSURE: 93 MMHG

## 2021-05-19 DIAGNOSIS — M54.12 CERVICAL RADICULOPATHY: ICD-10-CM

## 2021-05-19 DIAGNOSIS — R29.898 DECREASED RANGE OF MOTION OF NECK: ICD-10-CM

## 2021-05-19 DIAGNOSIS — M54.2 NECK PAIN: ICD-10-CM

## 2021-05-19 DIAGNOSIS — M60.9 MYOSITIS, UNSPECIFIED MYOSITIS TYPE, UNSPECIFIED SITE: Primary | ICD-10-CM

## 2021-05-19 PROCEDURE — 1125F AMNT PAIN NOTED PAIN PRSNT: CPT | Mod: S$GLB,,, | Performed by: ANESTHESIOLOGY

## 2021-05-19 PROCEDURE — 99999 PR PBB SHADOW E&M-EST. PATIENT-LVL III: ICD-10-PCS | Mod: PBBFAC,,, | Performed by: ANESTHESIOLOGY

## 2021-05-19 PROCEDURE — 1125F PR PAIN SEVERITY QUANTIFIED, PAIN PRESENT: ICD-10-PCS | Mod: S$GLB,,, | Performed by: ANESTHESIOLOGY

## 2021-05-19 PROCEDURE — 3008F PR BODY MASS INDEX (BMI) DOCUMENTED: ICD-10-PCS | Mod: CPTII,S$GLB,, | Performed by: ANESTHESIOLOGY

## 2021-05-19 PROCEDURE — 99214 OFFICE O/P EST MOD 30 MIN: CPT | Mod: 25,S$GLB,, | Performed by: ANESTHESIOLOGY

## 2021-05-19 PROCEDURE — 97140 MANUAL THERAPY 1/> REGIONS: CPT | Mod: PN

## 2021-05-19 PROCEDURE — 99214 PR OFFICE/OUTPT VISIT, EST, LEVL IV, 30-39 MIN: ICD-10-PCS | Mod: 25,S$GLB,, | Performed by: ANESTHESIOLOGY

## 2021-05-19 PROCEDURE — 97162 PT EVAL MOD COMPLEX 30 MIN: CPT | Mod: PN

## 2021-05-19 PROCEDURE — 3008F BODY MASS INDEX DOCD: CPT | Mod: CPTII,S$GLB,, | Performed by: ANESTHESIOLOGY

## 2021-05-19 PROCEDURE — 99999 PR PBB SHADOW E&M-EST. PATIENT-LVL III: CPT | Mod: PBBFAC,,, | Performed by: ANESTHESIOLOGY

## 2021-05-20 PROBLEM — M54.2 NECK PAIN: Status: ACTIVE | Noted: 2021-05-20

## 2021-05-20 PROBLEM — R29.898 DECREASED RANGE OF MOTION OF NECK: Status: ACTIVE | Noted: 2021-05-20

## 2021-05-21 ENCOUNTER — CLINICAL SUPPORT (OUTPATIENT)
Dept: REHABILITATION | Facility: HOSPITAL | Age: 41
End: 2021-05-21
Payer: COMMERCIAL

## 2021-05-21 DIAGNOSIS — M54.2 NECK PAIN: ICD-10-CM

## 2021-05-21 DIAGNOSIS — R29.898 DECREASED RANGE OF MOTION OF NECK: ICD-10-CM

## 2021-05-21 PROCEDURE — 97140 MANUAL THERAPY 1/> REGIONS: CPT | Mod: PN

## 2021-05-21 PROCEDURE — 97012 MECHANICAL TRACTION THERAPY: CPT | Mod: PN

## 2021-05-21 PROCEDURE — 97110 THERAPEUTIC EXERCISES: CPT | Mod: PN

## 2021-05-25 ENCOUNTER — CLINICAL SUPPORT (OUTPATIENT)
Dept: REHABILITATION | Facility: HOSPITAL | Age: 41
End: 2021-05-25
Payer: COMMERCIAL

## 2021-05-25 DIAGNOSIS — R29.898 DECREASED RANGE OF MOTION OF NECK: ICD-10-CM

## 2021-05-25 DIAGNOSIS — M54.2 NECK PAIN: ICD-10-CM

## 2021-05-25 PROCEDURE — 97110 THERAPEUTIC EXERCISES: CPT | Mod: PN,CQ

## 2021-05-25 PROCEDURE — 97140 MANUAL THERAPY 1/> REGIONS: CPT | Mod: PN,CQ

## 2021-05-27 ENCOUNTER — CLINICAL SUPPORT (OUTPATIENT)
Dept: REHABILITATION | Facility: HOSPITAL | Age: 41
End: 2021-05-27
Payer: COMMERCIAL

## 2021-05-27 DIAGNOSIS — M54.2 NECK PAIN: ICD-10-CM

## 2021-05-27 DIAGNOSIS — R29.898 DECREASED RANGE OF MOTION OF NECK: ICD-10-CM

## 2021-05-27 PROCEDURE — 97140 MANUAL THERAPY 1/> REGIONS: CPT | Mod: PN,CQ,59

## 2021-05-27 PROCEDURE — 97110 THERAPEUTIC EXERCISES: CPT | Mod: PN,CQ

## 2021-05-27 PROCEDURE — 97012 MECHANICAL TRACTION THERAPY: CPT | Mod: PN,CQ

## 2021-06-07 ENCOUNTER — TELEPHONE (OUTPATIENT)
Dept: PAIN MEDICINE | Facility: CLINIC | Age: 41
End: 2021-06-07

## 2021-06-08 ENCOUNTER — OFFICE VISIT (OUTPATIENT)
Dept: PAIN MEDICINE | Facility: CLINIC | Age: 41
End: 2021-06-08
Payer: COMMERCIAL

## 2021-06-08 VITALS
DIASTOLIC BLOOD PRESSURE: 78 MMHG | HEART RATE: 91 BPM | SYSTOLIC BLOOD PRESSURE: 141 MMHG | BODY MASS INDEX: 21.25 KG/M2 | HEIGHT: 74 IN | WEIGHT: 165.56 LBS

## 2021-06-08 DIAGNOSIS — M51.36 DDD (DEGENERATIVE DISC DISEASE), LUMBAR: ICD-10-CM

## 2021-06-08 DIAGNOSIS — M47.812 CERVICAL SPONDYLOSIS: ICD-10-CM

## 2021-06-08 DIAGNOSIS — M54.12 CERVICAL RADICULOPATHY: Primary | ICD-10-CM

## 2021-06-08 DIAGNOSIS — M54.2 CERVICALGIA: ICD-10-CM

## 2021-06-08 PROCEDURE — 1125F AMNT PAIN NOTED PAIN PRSNT: CPT | Mod: S$GLB,,, | Performed by: NURSE PRACTITIONER

## 2021-06-08 PROCEDURE — 3008F BODY MASS INDEX DOCD: CPT | Mod: CPTII,S$GLB,, | Performed by: NURSE PRACTITIONER

## 2021-06-08 PROCEDURE — 99999 PR PBB SHADOW E&M-EST. PATIENT-LVL III: CPT | Mod: PBBFAC,,, | Performed by: NURSE PRACTITIONER

## 2021-06-08 PROCEDURE — 99999 PR PBB SHADOW E&M-EST. PATIENT-LVL III: ICD-10-PCS | Mod: PBBFAC,,, | Performed by: NURSE PRACTITIONER

## 2021-06-08 PROCEDURE — 3008F PR BODY MASS INDEX (BMI) DOCUMENTED: ICD-10-PCS | Mod: CPTII,S$GLB,, | Performed by: NURSE PRACTITIONER

## 2021-06-08 PROCEDURE — 1125F PR PAIN SEVERITY QUANTIFIED, PAIN PRESENT: ICD-10-PCS | Mod: S$GLB,,, | Performed by: NURSE PRACTITIONER

## 2021-06-08 PROCEDURE — 99213 PR OFFICE/OUTPT VISIT, EST, LEVL III, 20-29 MIN: ICD-10-PCS | Mod: S$GLB,,, | Performed by: NURSE PRACTITIONER

## 2021-06-08 PROCEDURE — 99213 OFFICE O/P EST LOW 20 MIN: CPT | Mod: S$GLB,,, | Performed by: NURSE PRACTITIONER

## 2021-06-11 ENCOUNTER — TELEPHONE (OUTPATIENT)
Dept: PAIN MEDICINE | Facility: CLINIC | Age: 41
End: 2021-06-11

## 2021-06-14 ENCOUNTER — TELEPHONE (OUTPATIENT)
Dept: PAIN MEDICINE | Facility: CLINIC | Age: 41
End: 2021-06-14

## 2021-07-13 ENCOUNTER — PATIENT MESSAGE (OUTPATIENT)
Dept: FAMILY MEDICINE | Facility: CLINIC | Age: 41
End: 2021-07-13

## 2021-07-13 DIAGNOSIS — F41.9 ANXIETY: ICD-10-CM

## 2021-07-13 DIAGNOSIS — I10 ESSENTIAL HYPERTENSION: ICD-10-CM

## 2021-07-13 RX ORDER — LISINOPRIL AND HYDROCHLOROTHIAZIDE 12.5; 2 MG/1; MG/1
TABLET ORAL
Qty: 90 TABLET | Refills: 0 | Status: SHIPPED | OUTPATIENT
Start: 2021-07-13 | End: 2021-09-21 | Stop reason: SDUPTHER

## 2021-07-13 RX ORDER — DULOXETIN HYDROCHLORIDE 60 MG/1
CAPSULE, DELAYED RELEASE ORAL
Qty: 90 CAPSULE | Refills: 0 | Status: SHIPPED | OUTPATIENT
Start: 2021-07-13 | End: 2021-09-21 | Stop reason: SDUPTHER

## 2021-07-28 ENCOUNTER — DOCUMENTATION ONLY (OUTPATIENT)
Dept: REHABILITATION | Facility: HOSPITAL | Age: 41
End: 2021-07-28

## 2021-07-28 PROBLEM — R29.898 DECREASED RANGE OF MOTION OF NECK: Status: RESOLVED | Noted: 2021-05-20 | Resolved: 2021-07-28

## 2021-07-28 PROBLEM — M54.2 NECK PAIN: Status: RESOLVED | Noted: 2021-05-20 | Resolved: 2021-07-28

## 2021-09-05 ENCOUNTER — HOSPITAL ENCOUNTER (EMERGENCY)
Facility: HOSPITAL | Age: 41
Discharge: HOME | End: 2021-09-06
Attending: EMERGENCY MEDICINE
Payer: COMMERCIAL

## 2021-09-05 DIAGNOSIS — F41.0 PANIC ATTACK: ICD-10-CM

## 2021-09-05 DIAGNOSIS — F41.1 ANXIETY REACTION: Primary | ICD-10-CM

## 2021-09-05 DIAGNOSIS — F41.9 ANXIETY: ICD-10-CM

## 2021-09-05 DIAGNOSIS — R07.89 CHEST TIGHTNESS: ICD-10-CM

## 2021-09-05 DIAGNOSIS — R45.89 ANXIETY ABOUT HEALTH: ICD-10-CM

## 2021-09-05 LAB
ALBUMIN SERPL BCP-MCNC: 4.8 G/DL (ref 3.5–5.2)
ALP SERPL-CCNC: 62 U/L (ref 55–135)
ALT SERPL W/O P-5'-P-CCNC: 51 U/L (ref 10–44)
ANION GAP SERPL CALC-SCNC: 17 MMOL/L (ref 8–16)
AST SERPL-CCNC: 82 U/L (ref 10–40)
BASOPHILS # BLD AUTO: 0.12 K/UL (ref 0–0.2)
BASOPHILS NFR BLD: 1.7 % (ref 0–1.9)
BILIRUB SERPL-MCNC: 0.7 MG/DL (ref 0.1–1)
BUN SERPL-MCNC: 5 MG/DL (ref 6–20)
CALCIUM SERPL-MCNC: 9.2 MG/DL (ref 8.7–10.5)
CHLORIDE SERPL-SCNC: 100 MMOL/L (ref 95–110)
CO2 SERPL-SCNC: 14 MMOL/L (ref 23–29)
CREAT SERPL-MCNC: 0.8 MG/DL (ref 0.5–1.4)
DIFFERENTIAL METHOD: ABNORMAL
EOSINOPHIL # BLD AUTO: 0.2 K/UL (ref 0–0.5)
EOSINOPHIL NFR BLD: 2.2 % (ref 0–8)
ERYTHROCYTE [DISTWIDTH] IN BLOOD BY AUTOMATED COUNT: 11.6 % (ref 11.5–14.5)
EST. GFR  (AFRICAN AMERICAN): >60 ML/MIN/1.73 M^2
EST. GFR  (NON AFRICAN AMERICAN): >60 ML/MIN/1.73 M^2
GLUCOSE SERPL-MCNC: 82 MG/DL (ref 70–110)
HCT VFR BLD AUTO: 44.1 % (ref 40–54)
HGB BLD-MCNC: 15.2 G/DL (ref 14–18)
IMM GRANULOCYTES # BLD AUTO: 0.01 K/UL (ref 0–0.04)
IMM GRANULOCYTES NFR BLD AUTO: 0.1 % (ref 0–0.5)
LYMPHOCYTES # BLD AUTO: 2.8 K/UL (ref 1–4.8)
LYMPHOCYTES NFR BLD: 40.1 % (ref 18–48)
MCH RBC QN AUTO: 35.9 PG (ref 27–31)
MCHC RBC AUTO-ENTMCNC: 34.5 G/DL (ref 32–36)
MCV RBC AUTO: 104 FL (ref 82–98)
MONOCYTES # BLD AUTO: 0.7 K/UL (ref 0.3–1)
MONOCYTES NFR BLD: 10.5 % (ref 4–15)
NEUTROPHILS # BLD AUTO: 3.1 K/UL (ref 1.8–7.7)
NEUTROPHILS NFR BLD: 45.4 % (ref 38–73)
NRBC BLD-RTO: 0 /100 WBC
PLATELET # BLD AUTO: 167 K/UL (ref 150–450)
PMV BLD AUTO: 9.6 FL (ref 9.2–12.9)
POTASSIUM SERPL-SCNC: 4.3 MMOL/L (ref 3.5–5.1)
PROT SERPL-MCNC: 8 G/DL (ref 6–8.4)
RBC # BLD AUTO: 4.23 M/UL (ref 4.6–6.2)
SODIUM SERPL-SCNC: 131 MMOL/L (ref 136–145)
WBC # BLD AUTO: 6.88 K/UL (ref 3.9–12.7)

## 2021-09-05 PROCEDURE — 80053 COMPREHEN METABOLIC PANEL: CPT | Performed by: EMERGENCY MEDICINE

## 2021-09-05 PROCEDURE — 25000003 PHARM REV CODE 250: Performed by: EMERGENCY MEDICINE

## 2021-09-05 PROCEDURE — 93010 ELECTROCARDIOGRAM REPORT: CPT | Mod: ,,, | Performed by: INTERNAL MEDICINE

## 2021-09-05 PROCEDURE — 99284 EMERGENCY DEPT VISIT MOD MDM: CPT

## 2021-09-05 PROCEDURE — 85025 COMPLETE CBC W/AUTO DIFF WBC: CPT | Performed by: EMERGENCY MEDICINE

## 2021-09-05 PROCEDURE — 93010 EKG 12-LEAD: ICD-10-PCS | Mod: ,,, | Performed by: INTERNAL MEDICINE

## 2021-09-05 PROCEDURE — 93005 ELECTROCARDIOGRAM TRACING: CPT

## 2021-09-05 RX ORDER — HYDROXYZINE PAMOATE 25 MG/1
50 CAPSULE ORAL
Status: COMPLETED | OUTPATIENT
Start: 2021-09-05 | End: 2021-09-05

## 2021-09-05 RX ORDER — DULOXETIN HYDROCHLORIDE 30 MG/1
60 CAPSULE, DELAYED RELEASE ORAL ONCE
Status: COMPLETED | OUTPATIENT
Start: 2021-09-05 | End: 2021-09-05

## 2021-09-05 RX ADMIN — HYDROXYZINE PAMOATE 50 MG: 25 CAPSULE ORAL at 09:09

## 2021-09-05 RX ADMIN — DULOXETINE 60 MG: 30 CAPSULE, DELAYED RELEASE ORAL at 09:09

## 2021-09-06 ENCOUNTER — NURSE TRIAGE (OUTPATIENT)
Dept: ADMINISTRATIVE | Facility: CLINIC | Age: 41
End: 2021-09-06

## 2021-09-06 VITALS
BODY MASS INDEX: 22.46 KG/M2 | TEMPERATURE: 99 F | OXYGEN SATURATION: 96 % | HEIGHT: 74 IN | HEART RATE: 92 BPM | WEIGHT: 175 LBS | RESPIRATION RATE: 19 BRPM | DIASTOLIC BLOOD PRESSURE: 90 MMHG | SYSTOLIC BLOOD PRESSURE: 170 MMHG

## 2021-09-06 LAB — TROPONIN I SERPL DL<=0.01 NG/ML-MCNC: 0.01 NG/ML (ref 0–0.03)

## 2021-09-06 PROCEDURE — 84484 ASSAY OF TROPONIN QUANT: CPT | Performed by: EMERGENCY MEDICINE

## 2021-09-06 RX ORDER — DULOXETIN HYDROCHLORIDE 60 MG/1
60 CAPSULE, DELAYED RELEASE ORAL DAILY
Qty: 14 CAPSULE | Refills: 0 | Status: SHIPPED | OUTPATIENT
Start: 2021-09-06 | End: 2021-09-06

## 2021-09-06 RX ORDER — DULOXETIN HYDROCHLORIDE 60 MG/1
60 CAPSULE, DELAYED RELEASE ORAL DAILY
Qty: 14 CAPSULE | Refills: 0 | Status: SHIPPED | OUTPATIENT
Start: 2021-09-06 | End: 2021-09-20

## 2021-09-06 RX ORDER — HYDROXYZINE HYDROCHLORIDE 25 MG/1
25 TABLET, FILM COATED ORAL EVERY 6 HOURS
Qty: 12 TABLET | Refills: 0 | Status: SHIPPED | OUTPATIENT
Start: 2021-09-06 | End: 2021-09-21 | Stop reason: SDUPTHER

## 2021-09-06 RX ORDER — HYDROXYZINE HYDROCHLORIDE 25 MG/1
25 TABLET, FILM COATED ORAL EVERY 6 HOURS
Qty: 12 TABLET | Refills: 0 | Status: SHIPPED | OUTPATIENT
Start: 2021-09-06 | End: 2021-09-06

## 2021-09-20 ENCOUNTER — PATIENT MESSAGE (OUTPATIENT)
Dept: FAMILY MEDICINE | Facility: CLINIC | Age: 41
End: 2021-09-20

## 2021-09-20 DIAGNOSIS — I10 ESSENTIAL HYPERTENSION: ICD-10-CM

## 2021-09-20 RX ORDER — LISINOPRIL AND HYDROCHLOROTHIAZIDE 12.5; 2 MG/1; MG/1
1 TABLET ORAL DAILY
Qty: 90 TABLET | Refills: 0 | Status: CANCELLED | OUTPATIENT
Start: 2021-09-20

## 2021-09-21 ENCOUNTER — LAB VISIT (OUTPATIENT)
Dept: LAB | Facility: HOSPITAL | Age: 41
End: 2021-09-21
Attending: FAMILY MEDICINE
Payer: COMMERCIAL

## 2021-09-21 ENCOUNTER — OFFICE VISIT (OUTPATIENT)
Dept: FAMILY MEDICINE | Facility: CLINIC | Age: 41
End: 2021-09-21
Payer: COMMERCIAL

## 2021-09-21 VITALS
DIASTOLIC BLOOD PRESSURE: 82 MMHG | WEIGHT: 167.56 LBS | HEIGHT: 74 IN | TEMPERATURE: 99 F | OXYGEN SATURATION: 98 % | HEART RATE: 108 BPM | BODY MASS INDEX: 21.5 KG/M2 | SYSTOLIC BLOOD PRESSURE: 124 MMHG

## 2021-09-21 DIAGNOSIS — R74.01 TRANSAMINITIS: ICD-10-CM

## 2021-09-21 DIAGNOSIS — I10 ESSENTIAL HYPERTENSION: ICD-10-CM

## 2021-09-21 DIAGNOSIS — Z00.00 ROUTINE GENERAL MEDICAL EXAMINATION AT A HEALTH CARE FACILITY: Primary | ICD-10-CM

## 2021-09-21 DIAGNOSIS — Z00.00 ROUTINE GENERAL MEDICAL EXAMINATION AT A HEALTH CARE FACILITY: ICD-10-CM

## 2021-09-21 DIAGNOSIS — F17.200 TOBACCO DEPENDENCE: ICD-10-CM

## 2021-09-21 DIAGNOSIS — F41.9 ANXIETY: ICD-10-CM

## 2021-09-21 DIAGNOSIS — J44.9 CHRONIC OBSTRUCTIVE PULMONARY DISEASE, UNSPECIFIED COPD TYPE: ICD-10-CM

## 2021-09-21 LAB
ALBUMIN SERPL BCP-MCNC: 4.8 G/DL (ref 3.5–5.2)
ALP SERPL-CCNC: 53 U/L (ref 55–135)
ALT SERPL W/O P-5'-P-CCNC: 45 U/L (ref 10–44)
ANION GAP SERPL CALC-SCNC: 12 MMOL/L (ref 8–16)
AST SERPL-CCNC: 29 U/L (ref 10–40)
BASOPHILS # BLD AUTO: 0.11 K/UL (ref 0–0.2)
BASOPHILS NFR BLD: 1.7 % (ref 0–1.9)
BILIRUB SERPL-MCNC: 0.9 MG/DL (ref 0.1–1)
BUN SERPL-MCNC: 10 MG/DL (ref 6–20)
CALCIUM SERPL-MCNC: 10.3 MG/DL (ref 8.7–10.5)
CHLORIDE SERPL-SCNC: 97 MMOL/L (ref 95–110)
CHOLEST SERPL-MCNC: 222 MG/DL (ref 120–199)
CHOLEST/HDLC SERPL: 2.2 {RATIO} (ref 2–5)
CO2 SERPL-SCNC: 25 MMOL/L (ref 23–29)
CREAT SERPL-MCNC: 0.9 MG/DL (ref 0.5–1.4)
DIFFERENTIAL METHOD: ABNORMAL
EOSINOPHIL # BLD AUTO: 0.1 K/UL (ref 0–0.5)
EOSINOPHIL NFR BLD: 1.4 % (ref 0–8)
ERYTHROCYTE [DISTWIDTH] IN BLOOD BY AUTOMATED COUNT: 11.4 % (ref 11.5–14.5)
EST. GFR  (AFRICAN AMERICAN): >60 ML/MIN/1.73 M^2
EST. GFR  (NON AFRICAN AMERICAN): >60 ML/MIN/1.73 M^2
GLUCOSE SERPL-MCNC: 85 MG/DL (ref 70–110)
HCT VFR BLD AUTO: 44 % (ref 40–54)
HDLC SERPL-MCNC: 99 MG/DL (ref 40–75)
HDLC SERPL: 44.6 % (ref 20–50)
HGB BLD-MCNC: 15.4 G/DL (ref 14–18)
IMM GRANULOCYTES # BLD AUTO: 0.02 K/UL (ref 0–0.04)
IMM GRANULOCYTES NFR BLD AUTO: 0.3 % (ref 0–0.5)
LDLC SERPL CALC-MCNC: 112 MG/DL (ref 63–159)
LYMPHOCYTES # BLD AUTO: 1.6 K/UL (ref 1–4.8)
LYMPHOCYTES NFR BLD: 24.5 % (ref 18–48)
MCH RBC QN AUTO: 35.6 PG (ref 27–31)
MCHC RBC AUTO-ENTMCNC: 35 G/DL (ref 32–36)
MCV RBC AUTO: 102 FL (ref 82–98)
MONOCYTES # BLD AUTO: 0.7 K/UL (ref 0.3–1)
MONOCYTES NFR BLD: 11.4 % (ref 4–15)
NEUTROPHILS # BLD AUTO: 3.9 K/UL (ref 1.8–7.7)
NEUTROPHILS NFR BLD: 60.7 % (ref 38–73)
NONHDLC SERPL-MCNC: 123 MG/DL
NRBC BLD-RTO: 0 /100 WBC
PLATELET # BLD AUTO: 247 K/UL (ref 150–450)
PMV BLD AUTO: 8.6 FL (ref 9.2–12.9)
POTASSIUM SERPL-SCNC: 4.3 MMOL/L (ref 3.5–5.1)
PROT SERPL-MCNC: 7.9 G/DL (ref 6–8.4)
RBC # BLD AUTO: 4.32 M/UL (ref 4.6–6.2)
SODIUM SERPL-SCNC: 134 MMOL/L (ref 136–145)
TRIGL SERPL-MCNC: 55 MG/DL (ref 30–150)
TSH SERPL DL<=0.005 MIU/L-ACNC: 3.19 UIU/ML (ref 0.4–4)
WBC # BLD AUTO: 6.49 K/UL (ref 3.9–12.7)

## 2021-09-21 PROCEDURE — 99999 PR PBB SHADOW E&M-EST. PATIENT-LVL III: CPT | Mod: PBBFAC,,, | Performed by: FAMILY MEDICINE

## 2021-09-21 PROCEDURE — 3079F DIAST BP 80-89 MM HG: CPT | Mod: CPTII,S$GLB,, | Performed by: FAMILY MEDICINE

## 2021-09-21 PROCEDURE — 80053 COMPREHEN METABOLIC PANEL: CPT | Performed by: FAMILY MEDICINE

## 2021-09-21 PROCEDURE — 1159F PR MEDICATION LIST DOCUMENTED IN MEDICAL RECORD: ICD-10-PCS | Mod: CPTII,S$GLB,, | Performed by: FAMILY MEDICINE

## 2021-09-21 PROCEDURE — 3008F BODY MASS INDEX DOCD: CPT | Mod: CPTII,S$GLB,, | Performed by: FAMILY MEDICINE

## 2021-09-21 PROCEDURE — 84443 ASSAY THYROID STIM HORMONE: CPT | Performed by: FAMILY MEDICINE

## 2021-09-21 PROCEDURE — 4010F ACE/ARB THERAPY RXD/TAKEN: CPT | Mod: CPTII,S$GLB,, | Performed by: FAMILY MEDICINE

## 2021-09-21 PROCEDURE — 99396 PR PREVENTIVE VISIT,EST,40-64: ICD-10-PCS | Mod: S$GLB,,, | Performed by: FAMILY MEDICINE

## 2021-09-21 PROCEDURE — 3074F PR MOST RECENT SYSTOLIC BLOOD PRESSURE < 130 MM HG: ICD-10-PCS | Mod: CPTII,S$GLB,, | Performed by: FAMILY MEDICINE

## 2021-09-21 PROCEDURE — 80061 LIPID PANEL: CPT | Performed by: FAMILY MEDICINE

## 2021-09-21 PROCEDURE — 85025 COMPLETE CBC W/AUTO DIFF WBC: CPT | Performed by: FAMILY MEDICINE

## 2021-09-21 PROCEDURE — 99999 PR PBB SHADOW E&M-EST. PATIENT-LVL III: ICD-10-PCS | Mod: PBBFAC,,, | Performed by: FAMILY MEDICINE

## 2021-09-21 PROCEDURE — 1159F MED LIST DOCD IN RCRD: CPT | Mod: CPTII,S$GLB,, | Performed by: FAMILY MEDICINE

## 2021-09-21 PROCEDURE — 99396 PREV VISIT EST AGE 40-64: CPT | Mod: S$GLB,,, | Performed by: FAMILY MEDICINE

## 2021-09-21 PROCEDURE — 36415 COLL VENOUS BLD VENIPUNCTURE: CPT | Performed by: FAMILY MEDICINE

## 2021-09-21 PROCEDURE — 4010F PR ACE/ARB THEARPY RXD/TAKEN: ICD-10-PCS | Mod: CPTII,S$GLB,, | Performed by: FAMILY MEDICINE

## 2021-09-21 PROCEDURE — 3079F PR MOST RECENT DIASTOLIC BLOOD PRESSURE 80-89 MM HG: ICD-10-PCS | Mod: CPTII,S$GLB,, | Performed by: FAMILY MEDICINE

## 2021-09-21 PROCEDURE — 3008F PR BODY MASS INDEX (BMI) DOCUMENTED: ICD-10-PCS | Mod: CPTII,S$GLB,, | Performed by: FAMILY MEDICINE

## 2021-09-21 PROCEDURE — 3074F SYST BP LT 130 MM HG: CPT | Mod: CPTII,S$GLB,, | Performed by: FAMILY MEDICINE

## 2021-09-21 PROCEDURE — 80074 ACUTE HEPATITIS PANEL: CPT | Performed by: FAMILY MEDICINE

## 2021-09-21 RX ORDER — LISINOPRIL AND HYDROCHLOROTHIAZIDE 12.5; 2 MG/1; MG/1
1 TABLET ORAL DAILY
Qty: 90 TABLET | Refills: 3 | Status: SHIPPED | OUTPATIENT
Start: 2021-09-21 | End: 2022-10-06 | Stop reason: SDUPTHER

## 2021-09-21 RX ORDER — TIOTROPIUM BROMIDE 18 UG/1
18 CAPSULE ORAL; RESPIRATORY (INHALATION) DAILY
Qty: 60 CAPSULE | Refills: 11 | Status: SHIPPED | OUTPATIENT
Start: 2021-09-21

## 2021-09-21 RX ORDER — DULOXETIN HYDROCHLORIDE 60 MG/1
CAPSULE, DELAYED RELEASE ORAL
Qty: 90 CAPSULE | Refills: 3 | Status: SHIPPED | OUTPATIENT
Start: 2021-09-21 | End: 2021-10-07 | Stop reason: SDUPTHER

## 2021-09-21 RX ORDER — HYDROXYZINE HYDROCHLORIDE 25 MG/1
25 TABLET, FILM COATED ORAL 3 TIMES DAILY PRN
Qty: 30 TABLET | Refills: 6 | Status: SHIPPED | OUTPATIENT
Start: 2021-09-21 | End: 2021-10-07 | Stop reason: SDUPTHER

## 2021-09-22 ENCOUNTER — TELEPHONE (OUTPATIENT)
Dept: FAMILY MEDICINE | Facility: CLINIC | Age: 41
End: 2021-09-22

## 2021-09-22 ENCOUNTER — PATIENT MESSAGE (OUTPATIENT)
Dept: FAMILY MEDICINE | Facility: CLINIC | Age: 41
End: 2021-09-22

## 2021-09-22 DIAGNOSIS — R74.01 TRANSAMINITIS: Primary | ICD-10-CM

## 2021-09-22 LAB
HAV IGM SERPL QL IA: NEGATIVE
HBV CORE IGM SERPL QL IA: NEGATIVE
HBV SURFACE AG SERPL QL IA: NEGATIVE
HCV AB SERPL QL IA: NEGATIVE

## 2021-10-07 ENCOUNTER — TELEPHONE (OUTPATIENT)
Dept: FAMILY MEDICINE | Facility: CLINIC | Age: 41
End: 2021-10-07

## 2021-10-07 DIAGNOSIS — F41.9 ANXIETY: ICD-10-CM

## 2021-10-07 RX ORDER — HYDROXYZINE HYDROCHLORIDE 25 MG/1
25 TABLET, FILM COATED ORAL 3 TIMES DAILY PRN
Qty: 30 TABLET | Refills: 6 | Status: SHIPPED | OUTPATIENT
Start: 2021-10-07 | End: 2021-10-25 | Stop reason: SDUPTHER

## 2021-10-07 RX ORDER — DULOXETIN HYDROCHLORIDE 60 MG/1
CAPSULE, DELAYED RELEASE ORAL
Qty: 90 CAPSULE | Refills: 3 | Status: SHIPPED | OUTPATIENT
Start: 2021-10-07 | End: 2021-12-29 | Stop reason: SDUPTHER

## 2021-10-21 ENCOUNTER — OCCUPATIONAL HEALTH (OUTPATIENT)
Dept: URGENT CARE | Facility: CLINIC | Age: 41
End: 2021-10-21
Payer: COMMERCIAL

## 2021-10-21 DIAGNOSIS — Z20.822 ENCOUNTER FOR LABORATORY TESTING FOR COVID-19 VIRUS: Primary | ICD-10-CM

## 2021-10-21 PROCEDURE — U0003 INFECTIOUS AGENT DETECTION BY NUCLEIC ACID (DNA OR RNA); SEVERE ACUTE RESPIRATORY SYNDROME CORONAVIRUS 2 (SARS-COV-2) (CORONAVIRUS DISEASE [COVID-19]), AMPLIFIED PROBE TECHNIQUE, MAKING USE OF HIGH THROUGHPUT TECHNOLOGIES AS DESCRIBED BY CMS-2020-01-R: HCPCS | Performed by: PHYSICIAN ASSISTANT

## 2021-10-22 LAB
SARS-COV-2 RNA RESP QL NAA+PROBE: NOT DETECTED
SARS-COV-2- CYCLE NUMBER: NORMAL

## 2021-12-30 ENCOUNTER — PATIENT MESSAGE (OUTPATIENT)
Dept: FAMILY MEDICINE | Facility: CLINIC | Age: 41
End: 2021-12-30
Payer: COMMERCIAL

## 2022-12-29 NOTE — PROGRESS NOTES
"  HPI:  Erik Mims is a 37 y.o. year old male that  presents with   Chief Complaint   Patient presents with    follow up     thigh pain  and fainting feeling   .       No past medical history on file.  Social History     Social History    Marital status: Single     Spouse name: N/A    Number of children: N/A    Years of education: N/A     Occupational History    Not on file.     Social History Main Topics    Smoking status: Current Every Day Smoker    Smokeless tobacco: Never Used    Alcohol use Yes    Drug use: No    Sexual activity: Not on file     Other Topics Concern    Not on file     Social History Narrative    No narrative on file     Past Surgical History:   Procedure Laterality Date    LUNG SURGERY       No family history on file.        Pt c/o of continued episodes that initiaite with stomach discomfort in the epigastric area.  Feels Hungry.  Then tightness in throat, feels SOB, has fatigue in bilat thighs, generalized weakness, head feels off - not clear, no vertigo.  Lasts 30 min to 1 hour if is quiet and resting.  Almost daily.  Worse after eating.  He is functional, but not clear.  No flushing, no diaphoresis.    BP controlled.  Has SALMA.  On RLS meds - all are better.    Had neg cardio w/u            Health Maintenance Topics with due status: Overdue       Topic Date Due    Pneumococcal PPSV23 (Medium Risk) 06/28/1996    Influenza Vaccine 08/01/2017       Review of Systems  Review of Systems   Constitutional: Negative for fever.   Eyes: Negative for blurred vision.         Physical Exam:  /66   Pulse 109   Temp 98.6 °F (37 °C)   Ht 6' 2" (1.88 m)   Wt 87.5 kg (192 lb 14.4 oz)   SpO2 98%   BMI 24.77 kg/m²   Physical Exam   Constitutional: He is oriented to person, place, and time and well-developed, well-nourished, and in no distress.   HENT:   Head: Normocephalic and atraumatic.   Eyes: Conjunctivae are normal. Pupils are equal, round, and reactive to light.   Neck: " Normal range of motion.   Cardiovascular: Regular rhythm, normal heart sounds and intact distal pulses.    Pulmonary/Chest: Effort normal.   Abdominal: Soft. Bowel sounds are normal.   Neurological: He is alert and oriented to person, place, and time. Gait normal.   Skin: Skin is warm and dry.   Psychiatric: Mood and affect normal.         LABS:    Recent Results (from the past 2016 hour(s))   POCT glucose    Collection Time: 07/13/17 10:52 AM   Result Value Ref Range    POCT Glucose 94 70 - 110 mg/dL   CBC auto differential    Collection Time: 07/13/17 10:53 AM   Result Value Ref Range    WBC 5.35 3.90 - 12.70 K/uL    RBC 4.10 (L) 4.60 - 6.20 M/uL    Hemoglobin 14.2 14.0 - 18.0 g/dL    Hematocrit 41.8 40.0 - 54.0 %     (H) 82 - 98 fL    MCH 34.6 (H) 27.0 - 31.0 pg    MCHC 34.0 32.0 - 36.0 %    RDW 12.1 11.5 - 14.5 %    Platelets 197 150 - 350 K/uL    MPV 9.5 9.2 - 12.9 fL    Gran # 3.2 1.8 - 7.7 K/uL    Lymph # 1.3 1.0 - 4.8 K/uL    Mono # 0.7 0.3 - 1.0 K/uL    Eos # 0.1 0.0 - 0.5 K/uL    Baso # 0.09 0.00 - 0.20 K/uL    Gran% 59.8 38.0 - 73.0 %    Lymph% 24.3 18.0 - 48.0 %    Mono% 12.5 4.0 - 15.0 %    Eosinophil% 1.3 0.0 - 8.0 %    Basophil% 1.7 0.0 - 1.9 %    Differential Method Automated    Comprehensive metabolic panel    Collection Time: 07/13/17 10:53 AM   Result Value Ref Range    Sodium 139 136 - 145 mmol/L    Potassium 4.5 3.5 - 5.1 mmol/L    Chloride 110 95 - 110 mmol/L    CO2 19 (L) 23 - 29 mmol/L    Glucose 89 70 - 110 mg/dL    BUN, Bld 10 6 - 20 mg/dL    Creatinine 0.9 0.5 - 1.4 mg/dL    Calcium 9.3 8.7 - 10.5 mg/dL    Total Protein 7.3 6.0 - 8.4 g/dL    Albumin 4.3 3.5 - 5.2 g/dL    Total Bilirubin 0.7 0.1 - 1.0 mg/dL    Alkaline Phosphatase 48 (L) 55 - 135 U/L    AST 37 10 - 40 U/L    ALT 42 10 - 44 U/L    Anion Gap 10 8 - 16 mmol/L    eGFR if African American >60 >60 mL/min/1.73 m^2    eGFR if non African American >60 >60 mL/min/1.73 m^2   Ethanol    Collection Time: 07/13/17 10:53 AM    Result Value Ref Range    Alcohol, Medical, Serum <10 <10 mg/dL   TSH    Collection Time: 07/13/17 10:53 AM   Result Value Ref Range    TSH 0.506 0.400 - 4.000 uIU/mL   Troponin I    Collection Time: 07/13/17 10:53 AM   Result Value Ref Range    Troponin I <0.006 0.000 - 0.026 ng/mL   Magnesium    Collection Time: 07/13/17 10:53 AM   Result Value Ref Range    Magnesium 2.8 (H) 1.6 - 2.6 mg/dL   CPK    Collection Time: 07/13/17 10:53 AM   Result Value Ref Range     20 - 200 U/L   Phosphorus    Collection Time: 07/13/17 10:53 AM   Result Value Ref Range    Phosphorus 3.0 2.7 - 4.5 mg/dL   T4, free    Collection Time: 07/13/17 10:53 AM   Result Value Ref Range    Free T4 0.77 0.71 - 1.51 ng/dL   Urinalysis - Clean Catch    Collection Time: 07/13/17 10:57 AM   Result Value Ref Range    Specimen UA Urine, Clean Catch     Color, UA Yellow Yellow, Straw, Christiane    Appearance, UA Clear Clear    pH, UA 6.0 5.0 - 8.0    Specific Gravity, UA 1.010 1.005 - 1.030    Protein, UA Negative Negative    Glucose, UA Negative Negative    Ketones, UA Negative Negative    Bilirubin (UA) Negative Negative    Occult Blood UA Negative Negative    Nitrite, UA Negative Negative    Urobilinogen, UA Negative <2.0 EU/dL    Leukocytes, UA Negative Negative   Drug screen panel, emergency    Collection Time: 07/13/17 10:57 AM   Result Value Ref Range    Benzodiazepines Negative     Methadone metabolites Negative     Cocaine (Metab.) Negative     Opiate Scrn, Ur Negative     Barbiturate Screen, Ur Presumptive Positive     Amphetamine Screen, Ur Negative     THC Negative     Phencyclidine Negative     Creatinine, Random Ur 47.1 23.0 - 375.0 mg/dL    Toxicology Information SEE COMMENT    Cardiac treadmill stress test    Collection Time: 07/20/17  8:00 AM   Result Value Ref Range    Diastolic Dysfunction No    2D Echo w/ Color Flow Doppler    Collection Time: 07/28/17 11:00 AM   Result Value Ref Range    EF 65 55 - 65    Mitral Valve  Regurgitation TRIVIAL     Diastolic Dysfunction No     Est. PA Systolic Pressure 38.28     Tricuspid Valve Regurgitation MILD    Lipid panel    Collection Time: 08/09/17  7:51 AM   Result Value Ref Range    Cholesterol 233 (H) 120 - 199 mg/dL    Triglycerides 130 30 - 150 mg/dL    HDL 57 40 - 75 mg/dL    LDL Cholesterol 150.0 63.0 - 159.0 mg/dL    HDL/Chol Ratio 24.5 20.0 - 50.0 %    Total Cholesterol/HDL Ratio 4.1 2.0 - 5.0    Non-HDL Cholesterol 176 mg/dL       Imaging:  Imaging Results    None           Assessment:    ICD-10-CM ICD-9-CM    1. SALMA (obstructive sleep apnea) G47.33 327.23 CBC auto differential      CPAP FOR HOME USE   2. Chronic obstructive pulmonary disease, unspecified COPD type J44.9 496    3. RLS (restless legs syndrome) G25.81 333.94 CBC auto differential   4. Essential hypertension I10 401.9 5HIAA, Urine 24 Hour (Neuroendo)      Comprehensive metabolic panel      CBC auto differential   5. Near syncope R55 780.2 5HIAA, Urine 24 Hour (Neuroendo)      Comprehensive metabolic panel      CBC auto differential   6. SOB (shortness of breath) R06.02 786.05 5HIAA, Urine 24 Hour (Neuroendo)      Comprehensive metabolic panel      CBC auto differential   7. Chest tightness or pressure R07.89 786.59 5HIAA, Urine 24 Hour (Neuroendo)      Comprehensive metabolic panel      CBC auto differential   8. Epigastric pain R10.13 789.06 Ambulatory referral to Gastroenterology      5HIAA, Urine 24 Hour (Neuroendo)      Comprehensive metabolic panel      CBC auto differential   9. Weakness R53.1 780.79 5HIAA, Urine 24 Hour (Neuroendo)      Comprehensive metabolic panel      CBC auto differential   10. Fatigue, unspecified type R53.83 780.79 5HIAA, Urine 24 Hour (Neuroendo)      Comprehensive metabolic panel      CBC auto differential     The primary encounter diagnosis was SALMA (obstructive sleep apnea). Diagnoses of Chronic obstructive pulmonary disease, unspecified COPD type, RLS (restless legs syndrome), Essential  hypertension, Near syncope, SOB (shortness of breath), Chest tightness or pressure, Epigastric pain, Weakness, and Fatigue, unspecified type were also pertinent to this visit.      Plan:  Orders Placed This Encounter   Procedures    CPAP FOR HOME USE    5HIAA, Urine 24 Hour (Neuroendo)    Comprehensive metabolic panel    CBC auto differential    Ambulatory referral to Gastroenterology           Jaquan Huffman MD   Rifampin Counseling: I discussed with the patient the risks of rifampin including but not limited to liver damage, kidney damage, red-orange body fluids, nausea/vomiting and severe allergy.

## 2023-01-03 ENCOUNTER — PATIENT MESSAGE (OUTPATIENT)
Dept: FAMILY MEDICINE | Facility: CLINIC | Age: 43
End: 2023-01-03
Payer: COMMERCIAL

## 2023-03-08 ENCOUNTER — OFFICE VISIT (OUTPATIENT)
Dept: FAMILY MEDICINE | Facility: CLINIC | Age: 43
End: 2023-03-08
Payer: COMMERCIAL

## 2023-03-08 ENCOUNTER — HOSPITAL ENCOUNTER (OUTPATIENT)
Dept: RADIOLOGY | Facility: HOSPITAL | Age: 43
Discharge: HOME | End: 2023-03-08
Attending: FAMILY MEDICINE
Payer: COMMERCIAL

## 2023-03-08 VITALS
DIASTOLIC BLOOD PRESSURE: 106 MMHG | HEART RATE: 106 BPM | OXYGEN SATURATION: 98 % | WEIGHT: 196 LBS | HEIGHT: 74 IN | SYSTOLIC BLOOD PRESSURE: 170 MMHG | BODY MASS INDEX: 25.15 KG/M2

## 2023-03-08 DIAGNOSIS — M54.16 LUMBAR RADICULOPATHY: ICD-10-CM

## 2023-03-08 DIAGNOSIS — G89.29 CHRONIC MIDLINE LOW BACK PAIN WITH BILATERAL SCIATICA: ICD-10-CM

## 2023-03-08 DIAGNOSIS — M54.42 CHRONIC MIDLINE LOW BACK PAIN WITH BILATERAL SCIATICA: ICD-10-CM

## 2023-03-08 DIAGNOSIS — M54.41 CHRONIC MIDLINE LOW BACK PAIN WITH BILATERAL SCIATICA: ICD-10-CM

## 2023-03-08 DIAGNOSIS — M54.42 CHRONIC MIDLINE LOW BACK PAIN WITH BILATERAL SCIATICA: Primary | ICD-10-CM

## 2023-03-08 DIAGNOSIS — I10 ESSENTIAL HYPERTENSION: ICD-10-CM

## 2023-03-08 DIAGNOSIS — M54.41 CHRONIC MIDLINE LOW BACK PAIN WITH BILATERAL SCIATICA: Primary | ICD-10-CM

## 2023-03-08 DIAGNOSIS — G89.29 CHRONIC MIDLINE LOW BACK PAIN WITH BILATERAL SCIATICA: Primary | ICD-10-CM

## 2023-03-08 PROCEDURE — 72100 XR LUMBAR SPINE AP AND LATERAL: ICD-10-PCS | Mod: 26,,, | Performed by: RADIOLOGY

## 2023-03-08 PROCEDURE — 3080F DIAST BP >= 90 MM HG: CPT | Mod: CPTII,S$GLB,, | Performed by: FAMILY MEDICINE

## 2023-03-08 PROCEDURE — 99214 OFFICE O/P EST MOD 30 MIN: CPT | Mod: 25,S$GLB,, | Performed by: FAMILY MEDICINE

## 2023-03-08 PROCEDURE — 3008F BODY MASS INDEX DOCD: CPT | Mod: CPTII,S$GLB,, | Performed by: FAMILY MEDICINE

## 2023-03-08 PROCEDURE — 72100 X-RAY EXAM L-S SPINE 2/3 VWS: CPT | Mod: 26,,, | Performed by: RADIOLOGY

## 2023-03-08 PROCEDURE — 4010F PR ACE/ARB THEARPY RXD/TAKEN: ICD-10-PCS | Mod: CPTII,S$GLB,, | Performed by: FAMILY MEDICINE

## 2023-03-08 PROCEDURE — 99999 PR PBB SHADOW E&M-EST. PATIENT-LVL IV: ICD-10-PCS | Mod: PBBFAC,,, | Performed by: FAMILY MEDICINE

## 2023-03-08 PROCEDURE — 1159F MED LIST DOCD IN RCRD: CPT | Mod: CPTII,S$GLB,, | Performed by: FAMILY MEDICINE

## 2023-03-08 PROCEDURE — 3008F PR BODY MASS INDEX (BMI) DOCUMENTED: ICD-10-PCS | Mod: CPTII,S$GLB,, | Performed by: FAMILY MEDICINE

## 2023-03-08 PROCEDURE — 3077F SYST BP >= 140 MM HG: CPT | Mod: CPTII,S$GLB,, | Performed by: FAMILY MEDICINE

## 2023-03-08 PROCEDURE — 96372 THER/PROPH/DIAG INJ SC/IM: CPT | Mod: S$GLB,,, | Performed by: FAMILY MEDICINE

## 2023-03-08 PROCEDURE — 3077F PR MOST RECENT SYSTOLIC BLOOD PRESSURE >= 140 MM HG: ICD-10-PCS | Mod: CPTII,S$GLB,, | Performed by: FAMILY MEDICINE

## 2023-03-08 PROCEDURE — 4010F ACE/ARB THERAPY RXD/TAKEN: CPT | Mod: CPTII,S$GLB,, | Performed by: FAMILY MEDICINE

## 2023-03-08 PROCEDURE — 72100 X-RAY EXAM L-S SPINE 2/3 VWS: CPT | Mod: TC,FY

## 2023-03-08 PROCEDURE — 99999 PR PBB SHADOW E&M-EST. PATIENT-LVL IV: CPT | Mod: PBBFAC,,, | Performed by: FAMILY MEDICINE

## 2023-03-08 PROCEDURE — 99214 PR OFFICE/OUTPT VISIT, EST, LEVL IV, 30-39 MIN: ICD-10-PCS | Mod: 25,S$GLB,, | Performed by: FAMILY MEDICINE

## 2023-03-08 PROCEDURE — 1159F PR MEDICATION LIST DOCUMENTED IN MEDICAL RECORD: ICD-10-PCS | Mod: CPTII,S$GLB,, | Performed by: FAMILY MEDICINE

## 2023-03-08 PROCEDURE — 96372 PR INJECTION,THERAP/PROPH/DIAG2ST, IM OR SUBCUT: ICD-10-PCS | Mod: S$GLB,,, | Performed by: FAMILY MEDICINE

## 2023-03-08 PROCEDURE — 3080F PR MOST RECENT DIASTOLIC BLOOD PRESSURE >= 90 MM HG: ICD-10-PCS | Mod: CPTII,S$GLB,, | Performed by: FAMILY MEDICINE

## 2023-03-08 RX ORDER — IBUPROFEN 600 MG/1
600 TABLET ORAL 3 TIMES DAILY PRN
Qty: 60 TABLET | Refills: 0 | Status: SHIPPED | OUTPATIENT
Start: 2023-03-08

## 2023-03-08 RX ORDER — TRIAMCINOLONE ACETONIDE 40 MG/ML
40 INJECTION, SUSPENSION INTRA-ARTICULAR; INTRAMUSCULAR ONCE
Status: COMPLETED | OUTPATIENT
Start: 2023-03-08 | End: 2023-03-08

## 2023-03-08 RX ORDER — METHOCARBAMOL 500 MG/1
500 TABLET, FILM COATED ORAL 3 TIMES DAILY
Qty: 21 TABLET | Refills: 0 | Status: SHIPPED | OUTPATIENT
Start: 2023-03-08 | End: 2023-03-15

## 2023-03-08 RX ORDER — LISINOPRIL AND HYDROCHLOROTHIAZIDE 12.5; 2 MG/1; MG/1
1 TABLET ORAL DAILY
Qty: 90 TABLET | Refills: 0 | Status: SHIPPED | OUTPATIENT
Start: 2023-03-08 | End: 2023-06-14 | Stop reason: SDUPTHER

## 2023-03-08 RX ADMIN — TRIAMCINOLONE ACETONIDE 40 MG: 40 INJECTION, SUSPENSION INTRA-ARTICULAR; INTRAMUSCULAR at 11:03

## 2023-03-08 NOTE — PROGRESS NOTES
(Portions of this note were dictated using voice recognition software and may contain dictation related errors in spelling/grammar/syntax not found on text review)    CC:   Chief Complaint   Patient presents with    Back Pain     Since 2 weeks        HPI: 42 y.o. male, pt of dr Jacobs, presented with back pain, new to me. He has medical history significant for anxiety, COPD, hypertension, lumbar radiculopathy and chronic low back pain.  He reports back pain started 2 weeks ago, located on the lower back in the midline, describes as muscle spasms and tightness, it is intermittent problem, radiates towards bilateral buttocks and legs with associated tingling, aggravates with movements and physical activity,  he made an appointment with a chiropractor and was advised by the chiropractor to see a medical doctor. He had MRI done in 2019 and has followed up with pain management, reports having back injection by pain management back then which were helpful. He wants to see a specialist and does not want to go through physical therapy, states in the past it was not very effective. He also request to refill bp medication as he has been out of it.  He denies having any other symptoms or concerns.     Past Medical History:   Diagnosis Date    Anxiety     COPD (chronic obstructive pulmonary disease)     Hypertension     Syncope     Tobacco use 7/20/2017       Past Surgical History:   Procedure Laterality Date    LAPAROSCOPIC CHOLECYSTECTOMY WITH CHOLANGIOGRAPHY N/A 10/2/2019    Procedure: CHOLECYSTECTOMY, LAPAROSCOPIC, WITH CHOLANGIOGRAM;  Surgeon: Dylan Licona MD;  Location: [facility] OR;  Service: General;  Laterality: N/A;    LUNG SURGERY      TRANSFORAMINAL EPIDURAL INJECTION OF STEROID Left 10/19/2020    Procedure: INJECTION, STEROID, EPIDURAL, TRANSFORAMINAL APPROACH, L4-L5 AND L5-S1;  Surgeon: Farrah Leon MD;  Location: [facility];  Service: Pain Management;  Laterality: Left;       No family history on  file.    Social History     Tobacco Use    Smoking status: Every Day     Packs/day: 1.00     Years: 25.00     Pack years: 25.00     Types: Cigarettes     Start date: 1994    Smokeless tobacco: Never    Tobacco comments:     Handout provided for 1-800-QUIT-NOW smoking cessation program.    Substance Use Topics    Alcohol use: Yes    Drug use: No       Lab Results   Component Value Date    WBC 6.49 09/21/2021    HGB 15.4 09/21/2021    HCT 44.0 09/21/2021     (H) 09/21/2021     09/21/2021    CHOL 222 (H) 09/21/2021    TRIG 55 09/21/2021    HDL 99 (H) 09/21/2021    ALT 45 (H) 09/21/2021    AST 29 09/21/2021    BILITOT 0.9 09/21/2021    ALKPHOS 53 (L) 09/21/2021     (L) 09/21/2021    K 4.3 09/21/2021    CL 97 09/21/2021    CREATININE 0.9 09/21/2021    ESTGFRAFRICA >60 09/21/2021    EGFRNONAA >60 09/21/2021    CALCIUM 10.3 09/21/2021    ALBUMIN 4.8 09/21/2021    BUN 10 09/21/2021    CO2 25 09/21/2021    TSH 3.185 09/21/2021    LDLCALC 112.0 09/21/2021    GLU 85 09/21/2021             Vital signs reviewed  PE:   APPEARANCE: Well nourished, well developed, in no acute distress.    HEAD: Normocephalic, atraumatic.  EYES: EOMI.  Conjunctivae noninjected.  NECK: Supple with no cervical lymphadenopathy.    CHEST: Good inspiratory effort. Lungs clear to auscultation with no wheezes or crackles.  CARDIOVASCULAR: Normal S1, S2. No rubs, murmurs, or gallops.  ABDOMEN: Bowel sounds normal. Not distended. Soft. No tenderness or masses. No organomegaly.  EXTREMITIES: No edema, cyanosis, or clubbing.  BACK : TTP on lumbar spine and adjacent paraspinal muscle, ROM normal    Review of Systems   Constitutional:  Negative for chills, fatigue and fever.   HENT: Negative.     Respiratory:  Negative for cough, shortness of breath and wheezing.    Cardiovascular:  Negative for chest pain, palpitations and leg swelling.   Gastrointestinal:  Negative for abdominal pain, anal bleeding, blood in stool, change in bowel habit,  constipation, diarrhea, nausea, vomiting, reflux and change in bowel habit.   Genitourinary: Negative.    Musculoskeletal:  Positive for back pain.   Neurological: Negative.    Psychiatric/Behavioral: Negative.     All other systems reviewed and are negative.    IMPRESSION  1. Chronic midline low back pain with bilateral sciatica    2. Essential hypertension    3. Lumbar radiculopathy            PLAN      1. Essential hypertension    Elevated, out of meds, refilled    - lisinopriL-hydrochlorothiazide (PRINZIDE,ZESTORETIC) 20-12.5 mg per tablet; Take 1 tablet by mouth once daily.  Dispense: 90 tablet; Refill: 0      Advised to monitor bp at home and follow up with PCP      2. Chronic midline low back pain with bilateral sciatica    MRI from 7/2019 reviewed- Lower lumbar spondylosis resulting in mild central canal stenosis at L4-L5 with lateral recess narrowing and probable contact of the left transiting L5 nerve root.Trace bilateral facet effusions at L5-S1.      - triamcinolone acetonide injection 40 mg    - X-Ray Lumbar Spine AP And Lateral; Future    - ibuprofen (ADVIL,MOTRIN) 600 MG tablet; Take 1 tablet (600 mg total) by mouth 3 (three) times daily as needed for Pain.  Dispense: 60 tablet; Refill: 0  - methocarbamoL (ROBAXIN) 500 MG Tab; Take 1 tablet (500 mg total) by mouth 3 (three) times daily. for 7 days  Dispense: 21 tablet; Refill: 0    - Ambulatory referral/consult to Orthopedics; Future    Might need to see pain management for back injections      3. Lumbar radiculopathy    - triamcinolone acetonide injection 40 mg  - X-Ray Lumbar Spine AP And Lateral; Future  - ibuprofen (ADVIL,MOTRIN) 600 MG tablet; Take 1 tablet (600 mg total) by mouth 3 (three) times daily as needed for Pain.  Dispense: 60 tablet; Refill: 0  - methocarbamoL (ROBAXIN) 500 MG Tab; Take 1 tablet (500 mg total) by mouth 3 (three) times daily. for 7 days  Dispense: 21 tablet; Refill: 0  - Ambulatory referral/consult to Orthopedics;  Future         Age/demographic appropriate health maintenance:    Health Maintenance Due   Topic Date Due    Pneumococcal Vaccines (Age 0-64) (1 - PCV) Never done    Hemoglobin A1c (Diabetic Prevention Screening)  Never done    COVID-19 Vaccine (3 - Booster for Pfizer series) 05/07/2021    Influenza Vaccine (1) 09/01/2022             Stella Hernandez   3/8/2023

## 2023-03-08 NOTE — LETTER
March 8, 2023      [facility]  [address]  [place] [address]  Phone: [phone]  Fax: [phone]       Patient: Erik Mims   YOB: 1980  Date of Visit: 03/08/2023    To Whom It May Concern:    Ludy Mims  was at [facility] on 03/08/2023. The patient may return to work/school on 03/09/2023 with the following restrictions. He should avoid prolonged standing or heavy lifting for a week until he is able to follow up with an orthopedist. If you have any questions or concerns, or if I can be of further assistance, please do not hesitate to contact me.    Sincerely,    Dr Stella Hernandez MD

## 2023-03-09 ENCOUNTER — PATIENT MESSAGE (OUTPATIENT)
Dept: FAMILY MEDICINE | Facility: CLINIC | Age: 43
End: 2023-03-09
Payer: COMMERCIAL

## 2023-03-13 NOTE — H&P (VIEW-ONLY)
DATE: 3/13/2023  PATIENT: Erik Mims    Supervising Physician: Ralph Herrera M.D.    CHIEF COMPLAINT: low back pain    HISTORY:  Erik Mims is a 42 y.o. male with a pmhx of COPD here for initial evaluation of low back pain (Back - 6, Leg - 0).   The pain has been present for intermittently for years, worsening 2 weeks ago without injury. The patient describes the pain as aching.  The pain is worse with activity and improved by rest. There is negative associated numbness and tingling. There is negative subjective weakness. Prior treatments have included PT with continued home exercises and an ISABELLA in 2020 with 100% relief for 1 year, but no surgery.    The patient denies myelopathic symptoms such as handwriting changes or difficulty with buttons/coins/keys. Denies perineal paresthesias, bowel/bladder dysfunction.    PAST MEDICAL/SURGICAL HISTORY:  Past Medical History:   Diagnosis Date    Anxiety     COPD (chronic obstructive pulmonary disease)     Hypertension     Syncope     Tobacco use 7/20/2017     Past Surgical History:   Procedure Laterality Date    LAPAROSCOPIC CHOLECYSTECTOMY WITH CHOLANGIOGRAPHY N/A 10/2/2019    Procedure: CHOLECYSTECTOMY, LAPAROSCOPIC, WITH CHOLANGIOGRAM;  Surgeon: Dylan Licona MD;  Location: [facility] OR;  Service: General;  Laterality: N/A;    LUNG SURGERY      TRANSFORAMINAL EPIDURAL INJECTION OF STEROID Left 10/19/2020    Procedure: INJECTION, STEROID, EPIDURAL, TRANSFORAMINAL APPROACH, L4-L5 AND L5-S1;  Surgeon: Farrah Leon MD;  [facility];  Service: Pain Management;  Laterality: Left;       Medications:   Current Outpatient Medications on File Prior to Visit   Medication Sig Dispense Refill    DULoxetine (CYMBALTA) 60 MG capsule TAKE 1 CAPSULE(60 MG) BY MOUTH EVERY DAY 90 capsule 3    FLUCELVAX QUAD 5575-9357, PF, 60 mcg (15 mcg x 4)/0.5 mL Syrg PHARMACY ADMINISTERED      gabapentin (NEURONTIN) 300 MG capsule Take 1 capsule (300 mg total) by mouth  2 (two) times daily. (Patient not taking: Reported on 3/8/2023) 60 capsule 2    hydrOXYzine HCL (ATARAX) 25 MG tablet Take 1 tablet (25 mg total) by mouth 3 (three) times daily as needed for Anxiety. (Patient not taking: Reported on 3/8/2023) 30 tablet 6    ibuprofen (ADVIL,MOTRIN) 600 MG tablet Take 1 tablet (600 mg total) by mouth 3 (three) times daily as needed for Pain. 60 tablet 0    lisinopriL-hydrochlorothiazide (PRINZIDE,ZESTORETIC) 20-12.5 mg per tablet Take 1 tablet by mouth once daily. 90 tablet 0    methocarbamoL (ROBAXIN) 500 MG Tab Take 1 tablet (500 mg total) by mouth 3 (three) times daily. for 7 days 21 tablet 0    rOPINIRole (REQUIP) 0.25 MG tablet Take 1 tablet (0.25 mg total) by mouth nightly as needed. Further refills require visit with Dr. Dang (Patient not taking: Reported on 3/8/2023) 90 tablet 0    tiotropium (SPIRIVA) 18 mcg inhalation capsule Inhale 1 capsule (18 mcg total) into the lungs once daily. Controller (Patient not taking: Reported on 3/8/2023) 60 capsule 11     No current facility-administered medications on file prior to visit.       Social History:   Social History     Socioeconomic History    Marital status: Single   Tobacco Use    Smoking status: Every Day     Packs/day: 1.00     Years: 25.00     Pack years: 25.00     Types: Cigarettes     Start date: 1994    Smokeless tobacco: Never    Tobacco comments:     Handout provided for 1-800-QUIT-NOW smoking cessation program.    Substance and Sexual Activity    Alcohol use: Yes    Drug use: No       REVIEW OF SYSTEMS:  Constitution: Negative. Negative for chills, fever and night sweats.   Cardiovascular: Negative for chest pain and syncope.   Respiratory: Negative for cough and shortness of breath.   Gastrointestinal: See HPI. Negative for nausea/vomiting. Negative for abdominal pain.  Genitourinary: See HPI. Negative for discoloration or dysuria.  Skin: Negative for dry skin, itching and rash.   Hematologic/Lymphatic: Negative  for bleeding problem. Does not bruise/bleed easily.   Musculoskeletal: Negative for falls and muscle weakness.   Neurological: See HPI. No seizures.   Endocrine: Negative for polydipsia, polyphagia and polyuria.   Allergic/Immunologic: Negative for hives and persistent infections.     EXAM:  There were no vitals taken for this visit.    General: The patient is a very pleasant 42 y.o. male in no apparent distress, the patient is oriented to person, place and time.  Psych: Normal mood and affect  HEENT: Vision grossly intact, hearing intact to the spoken word.  Lungs: Respirations unlabored.  Gait: Normal station and gait, no difficulty with toe or heel walk.   Skin: Dorsal lumbar skin negative for rashes, lesions, hairy patches and surgical scars. There is mild lumbar tenderness to palpation.  Range of motion: Lumbar range of motion is acceptable.  Spinal Balance: Global saggital and coronal spinal balance acceptable, not significant for scoliosis and kyphosis.  Musculoskeletal: No pain with the range of motion of the bilateral hips. No trochanteric tenderness to palpation.  Vascular: Bilateral lower extremities warm and well perfused, dorsalis pedis pulses 2+ bilaterally.  Neurological: Normal strength and tone in all major motor groups in the bilateral lower extremities. Normal sensation to light touch in the L2-S1 dermatomes bilaterally.  Deep tendon reflexes symmetric 2+ in the bilateral lower extremities.  Negative Babinski bilaterally. Straight leg raise negative bilaterally.    IMAGING:      Today I personally reviewed AP, Lat and Flex/Ex  upright L-spine films that demonstrate significant disc space narrowing at L5-S1    MRI lumbar (2019) demonstrates lower lumbar spondylosis resulting in mild central canal stenosis at L4-L5 with lateral recess narrowing and probable contact of the left transiting L5 nerve root.     There is no height or weight on file to calculate BMI.    No results found for:  HGBA1C        ASSESSMENT/PLAN:    There are no diagnoses linked to this encounter.    Today we discussed at length all of the different treatment options including anti-inflammatories, acetaminophen, rest, ice, heat, physical therapy including strengthening and stretching exercises, home exercises, ROM, aerobic conditioning, aqua therapy, other modalities including ultrasound, massage, and dry needling, epidural steroid injections and finally surgical intervention.      Pt presents with chronic low back pain. Failure of conservative rx. Good relief with ISABELLA in past. Will order IL L4-5 ISABELLA with pain management. Pt will fu after injection.

## 2023-03-13 NOTE — PROGRESS NOTES
DATE: 3/13/2023  PATIENT: Erik Mims    Supervising Physician: Ralph Herrera M.D.    CHIEF COMPLAINT: low back pain    HISTORY:  Erik Mims is a 42 y.o. male with a pmhx of COPD here for initial evaluation of low back pain (Back - 6, Leg - 0).   The pain has been present for intermittently for years, worsening 2 weeks ago without injury. The patient describes the pain as aching.  The pain is worse with activity and improved by rest. There is negative associated numbness and tingling. There is negative subjective weakness. Prior treatments have included PT with continued home exercises and an ISABELLA in 2020 with 100% relief for 1 year, but no surgery.    The patient denies myelopathic symptoms such as handwriting changes or difficulty with buttons/coins/keys. Denies perineal paresthesias, bowel/bladder dysfunction.    PAST MEDICAL/SURGICAL HISTORY:  Past Medical History:   Diagnosis Date    Anxiety     COPD (chronic obstructive pulmonary disease)     Hypertension     Syncope     Tobacco use 7/20/2017     Past Surgical History:   Procedure Laterality Date    LAPAROSCOPIC CHOLECYSTECTOMY WITH CHOLANGIOGRAPHY N/A 10/2/2019    Procedure: CHOLECYSTECTOMY, LAPAROSCOPIC, WITH CHOLANGIOGRAM;  Surgeon: Dylan Licona MD;  Location: [facility] OR;  Service: General;  Laterality: N/A;    LUNG SURGERY      TRANSFORAMINAL EPIDURAL INJECTION OF STEROID Left 10/19/2020    Procedure: INJECTION, STEROID, EPIDURAL, TRANSFORAMINAL APPROACH, L4-L5 AND L5-S1;  Surgeon: Farrah Leon MD;  [facility];  Service: Pain Management;  Laterality: Left;       Medications:   Current Outpatient Medications on File Prior to Visit   Medication Sig Dispense Refill    DULoxetine (CYMBALTA) 60 MG capsule TAKE 1 CAPSULE(60 MG) BY MOUTH EVERY DAY 90 capsule 3    FLUCELVAX QUAD 9738-8407, PF, 60 mcg (15 mcg x 4)/0.5 mL Syrg PHARMACY ADMINISTERED      gabapentin (NEURONTIN) 300 MG capsule Take 1 capsule (300 mg total) by mouth  2 (two) times daily. (Patient not taking: Reported on 3/8/2023) 60 capsule 2    hydrOXYzine HCL (ATARAX) 25 MG tablet Take 1 tablet (25 mg total) by mouth 3 (three) times daily as needed for Anxiety. (Patient not taking: Reported on 3/8/2023) 30 tablet 6    ibuprofen (ADVIL,MOTRIN) 600 MG tablet Take 1 tablet (600 mg total) by mouth 3 (three) times daily as needed for Pain. 60 tablet 0    lisinopriL-hydrochlorothiazide (PRINZIDE,ZESTORETIC) 20-12.5 mg per tablet Take 1 tablet by mouth once daily. 90 tablet 0    methocarbamoL (ROBAXIN) 500 MG Tab Take 1 tablet (500 mg total) by mouth 3 (three) times daily. for 7 days 21 tablet 0    rOPINIRole (REQUIP) 0.25 MG tablet Take 1 tablet (0.25 mg total) by mouth nightly as needed. Further refills require visit with Dr. Dang (Patient not taking: Reported on 3/8/2023) 90 tablet 0    tiotropium (SPIRIVA) 18 mcg inhalation capsule Inhale 1 capsule (18 mcg total) into the lungs once daily. Controller (Patient not taking: Reported on 3/8/2023) 60 capsule 11     No current facility-administered medications on file prior to visit.       Social History:   Social History     Socioeconomic History    Marital status: Single   Tobacco Use    Smoking status: Every Day     Packs/day: 1.00     Years: 25.00     Pack years: 25.00     Types: Cigarettes     Start date: 1994    Smokeless tobacco: Never    Tobacco comments:     Handout provided for 1-800-QUIT-NOW smoking cessation program.    Substance and Sexual Activity    Alcohol use: Yes    Drug use: No       REVIEW OF SYSTEMS:  Constitution: Negative. Negative for chills, fever and night sweats.   Cardiovascular: Negative for chest pain and syncope.   Respiratory: Negative for cough and shortness of breath.   Gastrointestinal: See HPI. Negative for nausea/vomiting. Negative for abdominal pain.  Genitourinary: See HPI. Negative for discoloration or dysuria.  Skin: Negative for dry skin, itching and rash.   Hematologic/Lymphatic: Negative  for bleeding problem. Does not bruise/bleed easily.   Musculoskeletal: Negative for falls and muscle weakness.   Neurological: See HPI. No seizures.   Endocrine: Negative for polydipsia, polyphagia and polyuria.   Allergic/Immunologic: Negative for hives and persistent infections.     EXAM:  There were no vitals taken for this visit.    General: The patient is a very pleasant 42 y.o. male in no apparent distress, the patient is oriented to person, place and time.  Psych: Normal mood and affect  HEENT: Vision grossly intact, hearing intact to the spoken word.  Lungs: Respirations unlabored.  Gait: Normal station and gait, no difficulty with toe or heel walk.   Skin: Dorsal lumbar skin negative for rashes, lesions, hairy patches and surgical scars. There is mild lumbar tenderness to palpation.  Range of motion: Lumbar range of motion is acceptable.  Spinal Balance: Global saggital and coronal spinal balance acceptable, not significant for scoliosis and kyphosis.  Musculoskeletal: No pain with the range of motion of the bilateral hips. No trochanteric tenderness to palpation.  Vascular: Bilateral lower extremities warm and well perfused, dorsalis pedis pulses 2+ bilaterally.  Neurological: Normal strength and tone in all major motor groups in the bilateral lower extremities. Normal sensation to light touch in the L2-S1 dermatomes bilaterally.  Deep tendon reflexes symmetric 2+ in the bilateral lower extremities.  Negative Babinski bilaterally. Straight leg raise negative bilaterally.    IMAGING:      Today I personally reviewed AP, Lat and Flex/Ex  upright L-spine films that demonstrate significant disc space narrowing at L5-S1    MRI lumbar (2019) demonstrates lower lumbar spondylosis resulting in mild central canal stenosis at L4-L5 with lateral recess narrowing and probable contact of the left transiting L5 nerve root.     There is no height or weight on file to calculate BMI.    No results found for:  HGBA1C        ASSESSMENT/PLAN:    There are no diagnoses linked to this encounter.    Today we discussed at length all of the different treatment options including anti-inflammatories, acetaminophen, rest, ice, heat, physical therapy including strengthening and stretching exercises, home exercises, ROM, aerobic conditioning, aqua therapy, other modalities including ultrasound, massage, and dry needling, epidural steroid injections and finally surgical intervention.      Pt presents with chronic low back pain. Failure of conservative rx. Good relief with ISABELLA in past. Will order IL L4-5 ISABELLA with pain management. Pt will fu after injection.

## 2023-03-15 ENCOUNTER — TELEPHONE (OUTPATIENT)
Dept: ADMINISTRATIVE | Facility: OTHER | Age: 43
End: 2023-03-15
Payer: COMMERCIAL

## 2023-03-15 ENCOUNTER — OFFICE VISIT (OUTPATIENT)
Dept: ORTHOPEDICS | Facility: CLINIC | Age: 43
End: 2023-03-15
Payer: COMMERCIAL

## 2023-03-15 VITALS — HEIGHT: 74 IN | BODY MASS INDEX: 24.43 KG/M2 | WEIGHT: 190.38 LBS

## 2023-03-15 DIAGNOSIS — M54.41 CHRONIC MIDLINE LOW BACK PAIN WITH BILATERAL SCIATICA: ICD-10-CM

## 2023-03-15 DIAGNOSIS — G89.29 CHRONIC MIDLINE LOW BACK PAIN WITH BILATERAL SCIATICA: ICD-10-CM

## 2023-03-15 DIAGNOSIS — M54.42 CHRONIC MIDLINE LOW BACK PAIN WITH BILATERAL SCIATICA: ICD-10-CM

## 2023-03-15 PROCEDURE — 3008F BODY MASS INDEX DOCD: CPT | Mod: CPTII,S$GLB,, | Performed by: ORTHOPAEDIC SURGERY

## 2023-03-15 PROCEDURE — 1159F MED LIST DOCD IN RCRD: CPT | Mod: CPTII,S$GLB,, | Performed by: ORTHOPAEDIC SURGERY

## 2023-03-15 PROCEDURE — 99204 OFFICE O/P NEW MOD 45 MIN: CPT | Mod: S$GLB,,, | Performed by: ORTHOPAEDIC SURGERY

## 2023-03-15 PROCEDURE — 99999 PR PBB SHADOW E&M-EST. PATIENT-LVL III: ICD-10-PCS | Mod: PBBFAC,,, | Performed by: ORTHOPAEDIC SURGERY

## 2023-03-15 PROCEDURE — 99999 PR PBB SHADOW E&M-EST. PATIENT-LVL III: CPT | Mod: PBBFAC,,, | Performed by: ORTHOPAEDIC SURGERY

## 2023-03-15 PROCEDURE — 99204 PR OFFICE/OUTPT VISIT, NEW, LEVL IV, 45-59 MIN: ICD-10-PCS | Mod: S$GLB,,, | Performed by: ORTHOPAEDIC SURGERY

## 2023-03-15 PROCEDURE — 1159F PR MEDICATION LIST DOCUMENTED IN MEDICAL RECORD: ICD-10-PCS | Mod: CPTII,S$GLB,, | Performed by: ORTHOPAEDIC SURGERY

## 2023-03-15 PROCEDURE — 3008F PR BODY MASS INDEX (BMI) DOCUMENTED: ICD-10-PCS | Mod: CPTII,S$GLB,, | Performed by: ORTHOPAEDIC SURGERY

## 2023-03-15 PROCEDURE — 4010F ACE/ARB THERAPY RXD/TAKEN: CPT | Mod: CPTII,S$GLB,, | Performed by: ORTHOPAEDIC SURGERY

## 2023-03-15 PROCEDURE — 4010F PR ACE/ARB THEARPY RXD/TAKEN: ICD-10-PCS | Mod: CPTII,S$GLB,, | Performed by: ORTHOPAEDIC SURGERY

## 2023-03-15 NOTE — LETTER
March 15, 2023      [id number]  [address]  [facility] [address]  Phone: [phone]       Patient: Erik Mims   YOB: 1980  Date of Visit: 03/15/2023    To Whom It May Concern:    Ludy Mims  was at [facility] on 03/15/2023 for low back pain. We are planning to do an epidural steroid injection in the next few weeks (subject to openings). 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 Estimated return to full duty in 4 weeks. If you have any questions or concerns, or if I can be of further assistance, please do not hesitate to contact me.    Sincerely,    Ramona Sims PA-C

## 2023-03-29 ENCOUNTER — PATIENT MESSAGE (OUTPATIENT)
Dept: PAIN MEDICINE | Facility: OTHER | Age: 43
End: 2023-03-29
Payer: COMMERCIAL

## 2023-03-30 ENCOUNTER — HOSPITAL ENCOUNTER (OUTPATIENT)
Facility: OTHER | Age: 43
Discharge: HOME | End: 2023-03-30
Attending: ANESTHESIOLOGY | Admitting: ANESTHESIOLOGY
Payer: COMMERCIAL

## 2023-03-30 VITALS
OXYGEN SATURATION: 98 % | RESPIRATION RATE: 14 BRPM | SYSTOLIC BLOOD PRESSURE: 156 MMHG | TEMPERATURE: 99 F | HEIGHT: 74 IN | DIASTOLIC BLOOD PRESSURE: 82 MMHG | HEART RATE: 88 BPM | WEIGHT: 195 LBS | BODY MASS INDEX: 25.03 KG/M2

## 2023-03-30 DIAGNOSIS — M54.16 LUMBAR RADICULOPATHY: Primary | ICD-10-CM

## 2023-03-30 DIAGNOSIS — G89.29 CHRONIC PAIN: ICD-10-CM

## 2023-03-30 PROCEDURE — 25500020 PHARM REV CODE 255: Performed by: ANESTHESIOLOGY

## 2023-03-30 PROCEDURE — 25000003 PHARM REV CODE 250: Performed by: ANESTHESIOLOGY

## 2023-03-30 PROCEDURE — A4216 STERILE WATER/SALINE, 10 ML: HCPCS | Performed by: ANESTHESIOLOGY

## 2023-03-30 PROCEDURE — 63600175 PHARM REV CODE 636 W HCPCS: Performed by: ANESTHESIOLOGY

## 2023-03-30 PROCEDURE — 25000003 PHARM REV CODE 250: Performed by: STUDENT IN AN ORGANIZED HEALTH CARE EDUCATION/TRAINING PROGRAM

## 2023-03-30 PROCEDURE — 62323 PR INJ LUMBAR/SACRAL, W/IMAGING GUIDANCE: ICD-10-PCS | Mod: ,,, | Performed by: ANESTHESIOLOGY

## 2023-03-30 PROCEDURE — 62323 NJX INTERLAMINAR LMBR/SAC: CPT | Performed by: ANESTHESIOLOGY

## 2023-03-30 PROCEDURE — 62323 NJX INTERLAMINAR LMBR/SAC: CPT | Mod: ,,, | Performed by: ANESTHESIOLOGY

## 2023-03-30 RX ORDER — LIDOCAINE HYDROCHLORIDE 10 MG/ML
INJECTION, SOLUTION EPIDURAL; INFILTRATION; INTRACAUDAL; PERINEURAL
Status: DISCONTINUED | OUTPATIENT
Start: 2023-03-30 | End: 2023-03-30 | Stop reason: HOSPADM

## 2023-03-30 RX ORDER — DEXAMETHASONE SODIUM PHOSPHATE 10 MG/ML
INJECTION INTRAMUSCULAR; INTRAVENOUS
Status: DISCONTINUED | OUTPATIENT
Start: 2023-03-30 | End: 2023-03-30 | Stop reason: HOSPADM

## 2023-03-30 RX ORDER — SODIUM CHLORIDE 9 MG/ML
500 INJECTION, SOLUTION INTRAVENOUS CONTINUOUS
Status: DISCONTINUED | OUTPATIENT
Start: 2023-03-30 | End: 2023-03-30 | Stop reason: HOSPADM

## 2023-03-30 RX ORDER — SODIUM CHLORIDE 9 MG/ML
INJECTION, SOLUTION INTRAMUSCULAR; INTRAVENOUS; SUBCUTANEOUS
Status: DISCONTINUED | OUTPATIENT
Start: 2023-03-30 | End: 2023-03-30 | Stop reason: HOSPADM

## 2023-03-30 RX ORDER — MIDAZOLAM HYDROCHLORIDE 1 MG/ML
INJECTION INTRAMUSCULAR; INTRAVENOUS
Status: DISCONTINUED | OUTPATIENT
Start: 2023-03-30 | End: 2023-03-30 | Stop reason: HOSPADM

## 2023-03-30 RX ORDER — LIDOCAINE HYDROCHLORIDE 20 MG/ML
INJECTION, SOLUTION INFILTRATION; PERINEURAL
Status: DISCONTINUED | OUTPATIENT
Start: 2023-03-30 | End: 2023-03-30 | Stop reason: HOSPADM

## 2023-03-30 RX ORDER — FENTANYL CITRATE 50 UG/ML
INJECTION, SOLUTION INTRAMUSCULAR; INTRAVENOUS
Status: DISCONTINUED | OUTPATIENT
Start: 2023-03-30 | End: 2023-03-30 | Stop reason: HOSPADM

## 2023-03-30 NOTE — INTERVAL H&P NOTE
The patient was examined and no significant changes were noted from the updated H&P or last clinic note.    The risks and benefits of this procedure, including alternative therapies, were discussed with the patient.  The discussion of risks included infection, bleeding, need for additional procedures or surgery, nerve damage, paralysis, adverse medication reaction(s), stroke, and if appropriate for the procedure, death.  Questions regarding the procedure, risks, expected outcome, and possible side effects were solicited and answered to Erik's satisfaction.  Erik Mims wishes to proceed with the injection or procedure as confirmed by written consent.

## 2023-03-30 NOTE — BRIEF OP NOTE
Discharge Note  Short Stay      SUMMARY     Admit Date: 3/30/2023    Attending Physician: Farrah Leon      Discharge Physician: Farrah Leon      Discharge Date: 3/30/2023 11:39 AM    Procedure(s) (LRB):  INJECTION, STEROID, EPIDURAL, L4-L5 IL DIRECT REF (N/A)    Final Diagnosis: Chronic midline low back pain with bilateral sciatica [M54.41, M54.42, G89.29]    Disposition: Home or self care    Patient Instructions:   Current Discharge Medication List        CONTINUE these medications which have NOT CHANGED    Details   lisinopriL-hydrochlorothiazide (PRINZIDE,ZESTORETIC) 20-12.5 mg per tablet Take 1 tablet by mouth once daily.  Qty: 90 tablet, Refills: 0    Comments: .  Associated Diagnoses: Essential hypertension      DULoxetine (CYMBALTA) 60 MG capsule TAKE 1 CAPSULE(60 MG) BY MOUTH EVERY DAY  Qty: 90 capsule, Refills: 3    Associated Diagnoses: Anxiety      FLUCELVAX QUAD [id number], PF, 60 mcg (15 mcg x 4)/0.5 mL Syrg PHARMACY ADMINISTERED      gabapentin (NEURONTIN) 300 MG capsule Take 1 capsule (300 mg total) by mouth 2 (two) times daily.  Qty: 60 capsule, Refills: 2    Associated Diagnoses: Cervical radiculopathy      hydrOXYzine HCL (ATARAX) 25 MG tablet Take 1 tablet (25 mg total) by mouth 3 (three) times daily as needed for Anxiety.  Qty: 30 tablet, Refills: 6      ibuprofen (ADVIL,MOTRIN) 600 MG tablet Take 1 tablet (600 mg total) by mouth 3 (three) times daily as needed for Pain.  Qty: 60 tablet, Refills: 0    Associated Diagnoses: Chronic midline low back pain with bilateral sciatica      rOPINIRole (REQUIP) 0.25 MG tablet Take 1 tablet (0.25 mg total) by mouth nightly as needed. Further refills require visit with Dr. Dang  Qty: 90 tablet, Refills: 0    Associated Diagnoses: RLS (restless legs syndrome)      tiotropium (SPIRIVA) 18 mcg inhalation capsule Inhale 1 capsule (18 mcg total) into the lungs once daily. Controller  Qty: 60 capsule, Refills: 11                 Discharge Diagnosis: Chronic  midline low back pain with bilateral sciatica [M54.41, M54.42, G89.29]  Condition on Discharge: Stable with no complications to procedure   Diet on Discharge: Same as before.  Activity: as per instruction sheet.  Discharge to: Home with a responsible adult.  Follow up: 2-4 weeks       Please call my office or pager at [phone] if experienced any weakness or loss of sensation, fever > 101.5, pain uncontrolled with oral medications, persistent nausea/vomiting/or diarrhea, redness or drainage from the incisions, or any other worrisome concerns. If physician on call was not reached or could not communicate with our office for any reason please go to the nearest emergency department

## 2023-03-30 NOTE — OP NOTE
Lumbar Interlaminar Epidural Steroid Injection under Fluoroscopic Guidance    The procedure, risks, benefits, and options were discussed with the patient. There are no contraindications to the procedure. The patent expressed understanding and agreed to the procedure. Informed written consent was obtained prior to the start of the procedure and can be found in the patient's chart.    PATIENT NAME: Erik Mims   MRN: [id number]     DATE OF PROCEDURE: 03/30/2023    PROCEDURE: Lumbar Interlaminar Epidural Steroid Injection L4/L5 under Fluoroscopic Guidance    PRE-OP DIAGNOSIS: Chronic midline low back pain with bilateral sciatica [M54.41, M54.42, G89.29] Lumbar radiculopathy [M54.16]    POST-OP DIAGNOSIS: Same    PHYSICIAN: Farrah Leon MD    ASSISTANTS: Mark Chavez MD [employer] Pain Fellow       MEDICATIONS INJECTED: Preservative-free Decadron 10mg with 4cc of Lidocaine 1% MPF and preservative free normal saline    LOCAL ANESTHETIC INJECTED: Xylocaine 2%     SEDATION: Versed 2mg and Fentanyl 25mcg                                                                                                                                                                                     Conscious sedation ordered by M.D. Patient re-evaluation prior to administration of conscious sedation. No changes noted in patient's status from initial evaluation. The patient's vital signs were monitored by RN and patient remained hemodynamically stable throughout the procedure.    Event Time In   Sedation Start 1134   Sedation End 1138       ESTIMATED BLOOD LOSS: None    COMPLICATIONS: None    TECHNIQUE: Time-out was performed to identify the patient and procedure to be performed. With the patient laying in a prone position, the surgical area was prepped and draped in the usual sterile fashion using ChloraPrep and a fenestrated drape. The level was determined under fluoroscopy guidance. Skin anesthesia was achieved by injecting  Lidocaine 2% over the injection site. The interlaminar space was then approached with a 20 gauge,  3.5 inch Tuohy needle that was introduced under fluoroscopic guidance in the AP, lateral and/or contralateral oblique imaging. Once the Ligamentum flavum was encountered loss of resistance to saline was used to enter the epidural space. With positive loss of resistance and negative aspiration for CSF or Blood, contrast dye Omnipaque (300mg/mL) was injected to confirm placement and there was no vascular runoff. 5 mL of the medication mixture listed above was injected slowly. Displacement of the radio opaque contrast after injection of the medication confirmed that the medication went into the area of the epidural space. The needles were removed and bleeding was nil. A sterile dressing was applied. No specimens collected. The patient tolerated the procedure well.     PAIN BEFORE THE PROCEDURE: 3/10    PAIN AFTER THE PROCEDURE: 0/10      The patient was monitored after the procedure in the recovery area. They were given post-procedure and discharge instructions to follow at home. The patient was discharged in a stable condition.      Farrah Leon MD

## 2023-03-30 NOTE — DISCHARGE INSTRUCTIONS

## 2023-04-04 ENCOUNTER — PATIENT MESSAGE (OUTPATIENT)
Dept: ORTHOPEDICS | Facility: CLINIC | Age: 43
End: 2023-04-04
Payer: COMMERCIAL

## 2023-04-10 ENCOUNTER — PATIENT MESSAGE (OUTPATIENT)
Dept: ORTHOPEDICS | Facility: CLINIC | Age: 43
End: 2023-04-10
Payer: COMMERCIAL

## 2023-04-19 ENCOUNTER — OFFICE VISIT (OUTPATIENT)
Dept: FAMILY MEDICINE | Facility: CLINIC | Age: 43
End: 2023-04-19
Payer: COMMERCIAL

## 2023-04-19 VITALS
OXYGEN SATURATION: 97 % | DIASTOLIC BLOOD PRESSURE: 82 MMHG | TEMPERATURE: 99 F | HEIGHT: 74 IN | BODY MASS INDEX: 24.56 KG/M2 | WEIGHT: 191.38 LBS | SYSTOLIC BLOOD PRESSURE: 122 MMHG | HEART RATE: 125 BPM

## 2023-04-19 DIAGNOSIS — Z02.89 ENCOUNTER FOR COMPLETION OF FORM WITH PATIENT: Primary | ICD-10-CM

## 2023-04-19 DIAGNOSIS — G89.29 CHRONIC BILATERAL LOW BACK PAIN WITHOUT SCIATICA: ICD-10-CM

## 2023-04-19 DIAGNOSIS — M54.50 CHRONIC BILATERAL LOW BACK PAIN WITHOUT SCIATICA: ICD-10-CM

## 2023-04-19 PROCEDURE — 3074F SYST BP LT 130 MM HG: CPT | Mod: CPTII,S$GLB,, | Performed by: FAMILY MEDICINE

## 2023-04-19 PROCEDURE — 3079F DIAST BP 80-89 MM HG: CPT | Mod: CPTII,S$GLB,, | Performed by: FAMILY MEDICINE

## 2023-04-19 PROCEDURE — 99999 PR PBB SHADOW E&M-EST. PATIENT-LVL III: CPT | Mod: PBBFAC,,, | Performed by: FAMILY MEDICINE

## 2023-04-19 PROCEDURE — 3079F PR MOST RECENT DIASTOLIC BLOOD PRESSURE 80-89 MM HG: ICD-10-PCS | Mod: CPTII,S$GLB,, | Performed by: FAMILY MEDICINE

## 2023-04-19 PROCEDURE — 99999 PR PBB SHADOW E&M-EST. PATIENT-LVL III: ICD-10-PCS | Mod: PBBFAC,,, | Performed by: FAMILY MEDICINE

## 2023-04-19 PROCEDURE — 3074F PR MOST RECENT SYSTOLIC BLOOD PRESSURE < 130 MM HG: ICD-10-PCS | Mod: CPTII,S$GLB,, | Performed by: FAMILY MEDICINE

## 2023-04-19 PROCEDURE — 3008F BODY MASS INDEX DOCD: CPT | Mod: CPTII,S$GLB,, | Performed by: FAMILY MEDICINE

## 2023-04-19 PROCEDURE — 4010F ACE/ARB THERAPY RXD/TAKEN: CPT | Mod: CPTII,S$GLB,, | Performed by: FAMILY MEDICINE

## 2023-04-19 PROCEDURE — 3008F PR BODY MASS INDEX (BMI) DOCUMENTED: ICD-10-PCS | Mod: CPTII,S$GLB,, | Performed by: FAMILY MEDICINE

## 2023-04-19 PROCEDURE — 99214 PR OFFICE/OUTPT VISIT, EST, LEVL IV, 30-39 MIN: ICD-10-PCS | Mod: S$GLB,,, | Performed by: FAMILY MEDICINE

## 2023-04-19 PROCEDURE — 4010F PR ACE/ARB THEARPY RXD/TAKEN: ICD-10-PCS | Mod: CPTII,S$GLB,, | Performed by: FAMILY MEDICINE

## 2023-04-19 PROCEDURE — 1159F PR MEDICATION LIST DOCUMENTED IN MEDICAL RECORD: ICD-10-PCS | Mod: CPTII,S$GLB,, | Performed by: FAMILY MEDICINE

## 2023-04-19 PROCEDURE — 99214 OFFICE O/P EST MOD 30 MIN: CPT | Mod: S$GLB,,, | Performed by: FAMILY MEDICINE

## 2023-04-19 PROCEDURE — 1159F MED LIST DOCD IN RCRD: CPT | Mod: CPTII,S$GLB,, | Performed by: FAMILY MEDICINE

## 2023-04-19 NOTE — PROGRESS NOTES
(Portions of this note were dictated using voice recognition software and may contain dictation related errors in spelling/grammar/syntax not found on text review)    CC:   Chief Complaint   Patient presents with    Follow-up     Hopital follow       HPI: 42 y.o. male, pt of Dr Jacobs, presented for completion of FMLA paperwork up, last seen by me on 03/08/2023 with back pain.  He has medical history significant for chronic back pain with sciatica, anxiety, COPD, hypertension.  Patient was prescribed medications and was given referral to Orthopedic as he was interested in back injections, patient had steroid injection on 03/30 and was out of work from 03/30 till 4/5 for which he needs FMLA me paper workup completed.  Patient reports that back pain has improved.  He denies having any other symptoms or concerns.    Past Medical History:   Diagnosis Date    Anxiety     COPD (chronic obstructive pulmonary disease)     Hypertension     Syncope     Tobacco use 7/20/2017       Past Surgical History:   Procedure Laterality Date    EPIDURAL STEROID INJECTION N/A 3/30/2023    Procedure: INJECTION, STEROID, EPIDURAL, L4-L5 IL DIRECT REF;  Surgeon: Farrah Leon MD;  Location: [facility];  Service: Pain Management;  Laterality: N/A;    LAPAROSCOPIC CHOLECYSTECTOMY WITH CHOLANGIOGRAPHY N/A 10/2/2019    Procedure: CHOLECYSTECTOMY, LAPAROSCOPIC, WITH CHOLANGIOGRAM;  Surgeon: Dylan Licona MD;  Location: [facility] OR;  Service: General;  Laterality: N/A;    LUNG SURGERY      TRANSFORAMINAL EPIDURAL INJECTION OF STEROID Left 10/19/2020    Procedure: INJECTION, STEROID, EPIDURAL, TRANSFORAMINAL APPROACH, L4-L5 AND L5-S1;  Surgeon: Farrah Leon MD;  Location: [facility];  Service: Pain Management;  Laterality: Left;       History reviewed. No pertinent family history.    Social History     Tobacco Use    Smoking status: Every Day     Packs/day: 1.00     Years: 25.00     Pack years: 25.00     Types: Cigarettes     Start date: 1994     Smokeless tobacco: Never    Tobacco comments:     Handout provided for 1-800-QUIT-NOW smoking cessation program.    Substance Use Topics    Alcohol use: Yes    Drug use: No       Lab Results   Component Value Date    WBC 6.49 09/21/2021    HGB 15.4 09/21/2021    HCT 44.0 09/21/2021     (H) 09/21/2021     09/21/2021    CHOL 222 (H) 09/21/2021    TRIG 55 09/21/2021    HDL 99 (H) 09/21/2021    ALT 45 (H) 09/21/2021    AST 29 09/21/2021    BILITOT 0.9 09/21/2021    ALKPHOS 53 (L) 09/21/2021     (L) 09/21/2021    K 4.3 09/21/2021    CL 97 09/21/2021    CREATININE 0.9 09/21/2021    ESTGFRAFRICA >60 09/21/2021    EGFRNONAA >60 09/21/2021    CALCIUM 10.3 09/21/2021    ALBUMIN 4.8 09/21/2021    BUN 10 09/21/2021    CO2 25 09/21/2021    TSH 3.185 09/21/2021    LDLCALC 112.0 09/21/2021    GLU 85 09/21/2021             Vital signs reviewed  PE:   APPEARANCE: Well nourished, well developed, in no acute distress.    HEAD: Normocephalic, atraumatic.  EYES: EOMI.  Conjunctivae noninjected.  NECK: Supple with no cervical lymphadenopathy.    CHEST: Good inspiratory effort. Lungs clear to auscultation with no wheezes or crackles.  CARDIOVASCULAR: Normal S1, S2. No rubs, murmurs, or gallops.  ABDOMEN: Bowel sounds normal. Not distended. Soft. No tenderness or masses. No organomegaly.  EXTREMITIES: No edema, cyanosis, or clubbing.    Review of Systems   Constitutional:  Negative for chills, fatigue and fever.   HENT: Negative.     Respiratory:  Negative for cough, shortness of breath and wheezing.    Cardiovascular:  Negative for chest pain, palpitations and leg swelling.   Gastrointestinal: Negative.    Genitourinary: Negative.    Musculoskeletal: Negative.    Neurological: Negative.    Psychiatric/Behavioral: Negative.     All other systems reviewed and are negative.    IMPRESSION  1. Encounter for completion of form with patient    2. Chronic bilateral low back pain without sciatica            PLAN      1.  Encounter for completion of form with patient    Paper work up completed, scanned in chart and handed over to pt      2. Chronic bilateral low back pain without sciatica    Improved       F/U with orthopedic as per schedule      Age/demographic appropriate health maintenance:    Health Maintenance Due   Topic Date Due    Pneumococcal Vaccines (Age 0-64) (1 - PCV) Never done    COVID-19 Vaccine (3 - Booster for Pfizer series) 05/07/2021    Influenza Vaccine (1) 09/01/2022           F/U with PCP as per schedule      Stella Hernandez   4/19/2023

## 2023-06-14 DIAGNOSIS — I10 ESSENTIAL HYPERTENSION: ICD-10-CM

## 2023-06-14 RX ORDER — LISINOPRIL AND HYDROCHLOROTHIAZIDE 12.5; 2 MG/1; MG/1
TABLET ORAL
Qty: 90 TABLET | Refills: 0 | Status: SHIPPED | OUTPATIENT
Start: 2023-06-14 | End: 2023-12-11 | Stop reason: SDUPTHER

## 2023-12-11 DIAGNOSIS — I10 ESSENTIAL HYPERTENSION: ICD-10-CM

## 2023-12-11 RX ORDER — LISINOPRIL AND HYDROCHLOROTHIAZIDE 12.5; 2 MG/1; MG/1
TABLET ORAL
Qty: 90 TABLET | Refills: 0 | Status: SHIPPED | OUTPATIENT
Start: 2023-12-11

## 2023-12-11 NOTE — TELEPHONE ENCOUNTER
Care Due:                  Date            Visit Type   Department     Provider  --------------------------------------------------------------------------------    Last Visit: None Found      None         None Found  Next Visit: None Scheduled  None         None Found                                                            Last  Test          Frequency    Reason                     Performed    Due Date  --------------------------------------------------------------------------------    Office Visit  15 months..  lisinopriL-hydrochlorothi  Not Found    Overdue                             azide....................    CMP.........  12 months..  lisinopriL-hydrochlorothi  Not Found    Overdue                             azide....................    [employer] Embedded Care Due Messages. Reference number: [id number].   12/11/2023 5:26:43 AM CST

## 2024-03-10 DIAGNOSIS — F41.9 ANXIETY: ICD-10-CM

## 2024-03-10 NOTE — TELEPHONE ENCOUNTER
Care Due:                  Date            Visit Type   Department     Provider  --------------------------------------------------------------------------------    Last Visit: None Found      None         None Found  Next Visit: None Scheduled  None         None Found                                                            Last  Test          Frequency    Reason                     Performed    Due Date  --------------------------------------------------------------------------------    Office Visit  15 months..  lisinopriL-hydrochlorothi  Not Found    Overdue                             azide....................    CMP.........  12 months..  lisinopriL-hydrochlorothi  Not Found    Overdue                             azide....................    [employer] Embedded Care Due Messages. Reference number: [id number].   3/10/2024 5:25:02 AM CDT

## 2024-03-11 RX ORDER — DULOXETIN HYDROCHLORIDE 60 MG/1
CAPSULE, DELAYED RELEASE ORAL
Qty: 90 CAPSULE | Refills: 3 | Status: SHIPPED | OUTPATIENT
Start: 2024-03-11

## 2024-11-13 NOTE — ADDENDUM NOTE
Addended by: GRISEL HOUGH on: 10/10/2017 02:46 PM     Modules accepted: Orders    
Addended by: JOHNNY ROMAN on: 10/10/2017 02:50 PM     Modules accepted: Orders    
2

## (undated) DEVICE — IRRIGATOR ENDOSCOPY DISP.

## (undated) DEVICE — SCISSOR 5MMX35CM DIRECT DRIVE

## (undated) DEVICE — BANDAGE ADHESIVE

## (undated) DEVICE — Device

## (undated) DEVICE — SUT MCRYL PLUS 4-0 PS2 27IN

## (undated) DEVICE — DRESSING LEUKOPLAST FLEX 1X3IN

## (undated) DEVICE — DEVICE CLOSURE TROCAR SITE

## (undated) DEVICE — BAG TISS RETRV MONARCH 10MM

## (undated) DEVICE — DRESSING TEGADERM 2 3/8 X 2.75

## (undated) DEVICE — GLOVE PROTEXIS HYDROGEL SZ7

## (undated) DEVICE — ADHESIVE DERMABOND ADVANCED

## (undated) DEVICE — TROCAR ENDOPATH XCEL 5MM 7.5CM

## (undated) DEVICE — ELECTRODE REM PLYHSV RETURN 9

## (undated) DEVICE — TROCAR ENDOPATH XCEL 11MM 10CM

## (undated) DEVICE — MANIFOLD 4 PORT

## (undated) DEVICE — SEE MEDLINE ITEM 156952

## (undated) DEVICE — TROCAR ENDOPATH XCEL 5X75MM

## (undated) DEVICE — APPLIER CLIP ENDO LIGAMAX 5MM

## (undated) DEVICE — SPONGE DERMA 8PLY 2X2

## (undated) DEVICE — SYS CLSR WND ENDOSCP XL

## (undated) DEVICE — NDL HYPO REG 25G X 1 1/2

## (undated) DEVICE — CLOSURE SKIN STERI STRIP 1/2X4